# Patient Record
Sex: FEMALE | Race: WHITE | NOT HISPANIC OR LATINO | Employment: FULL TIME | URBAN - NONMETROPOLITAN AREA
[De-identification: names, ages, dates, MRNs, and addresses within clinical notes are randomized per-mention and may not be internally consistent; named-entity substitution may affect disease eponyms.]

---

## 2017-12-11 ENCOUNTER — HOSPITAL ENCOUNTER (EMERGENCY)
Facility: HOSPITAL | Age: 22
Discharge: LEFT BEFORE DISCHARGE | End: 2017-12-11
Attending: EMERGENCY MEDICINE

## 2017-12-11 VITALS
HEART RATE: 140 BPM | WEIGHT: 135 LBS | SYSTOLIC BLOOD PRESSURE: 122 MMHG | DIASTOLIC BLOOD PRESSURE: 73 MMHG | TEMPERATURE: 98.2 F | OXYGEN SATURATION: 99 % | BODY MASS INDEX: 22.49 KG/M2 | HEIGHT: 65 IN | RESPIRATION RATE: 18 BRPM

## 2017-12-11 PROCEDURE — 99211 OFF/OP EST MAY X REQ PHY/QHP: CPT

## 2017-12-11 NOTE — ED NOTES
Made lead nurse aware of patient increased heart rate. Will check in several minutes when patient is not as anxious.      Ayanna Barrios RN  12/11/17 [id number]

## 2019-02-28 ENCOUNTER — TELEPHONE (OUTPATIENT)
Dept: OBSTETRICS AND GYNECOLOGY | Age: 24
End: 2019-02-28

## 2019-02-28 NOTE — TELEPHONE ENCOUNTER
New pt: Requesting a female.lmp 1/15/19.Wants to come in for a blood pregnancy test. Ins: [employer]port. She can be reached @ [phone] or [phone].

## 2019-03-14 ENCOUNTER — OFFICE VISIT (OUTPATIENT)
Dept: OBSTETRICS AND GYNECOLOGY | Age: 24
End: 2019-03-14

## 2019-03-14 ENCOUNTER — PROCEDURE VISIT (OUTPATIENT)
Dept: OBSTETRICS AND GYNECOLOGY | Age: 24
End: 2019-03-14

## 2019-03-14 VITALS
WEIGHT: 143 LBS | SYSTOLIC BLOOD PRESSURE: 122 MMHG | HEIGHT: 65 IN | DIASTOLIC BLOOD PRESSURE: 70 MMHG | BODY MASS INDEX: 23.82 KG/M2

## 2019-03-14 VITALS
HEIGHT: 65 IN | SYSTOLIC BLOOD PRESSURE: 122 MMHG | BODY MASS INDEX: 23.82 KG/M2 | WEIGHT: 143 LBS | DIASTOLIC BLOOD PRESSURE: 70 MMHG

## 2019-03-14 DIAGNOSIS — O02.1 MISSED ABORTION: Primary | ICD-10-CM

## 2019-03-14 DIAGNOSIS — O36.80X0 ENCOUNTER TO DETERMINE FETAL VIABILITY OF PREGNANCY, SINGLE OR UNSPECIFIED FETUS: Primary | ICD-10-CM

## 2019-03-14 DIAGNOSIS — Z13.9 SPECIAL SCREENING: ICD-10-CM

## 2019-03-14 PROBLEM — Z72.0 TOBACCO USE: Status: ACTIVE | Noted: 2019-03-14

## 2019-03-14 PROCEDURE — 99203 OFFICE O/P NEW LOW 30 MIN: CPT | Performed by: OBSTETRICS & GYNECOLOGY

## 2019-03-14 PROCEDURE — 76817 TRANSVAGINAL US OBSTETRIC: CPT | Performed by: OBSTETRICS & GYNECOLOGY

## 2019-03-14 NOTE — PROGRESS NOTES
US completed DELISA Urbina RN RDMS        Ultrasound Note     2019    Patient:  Estephania Pathak      MR#:[id number]    23 y.o.   for dating and viability     Patient Active Problem List   Diagnosis   • Tobacco use   • Missed        [See the scanned report in the media tab for more detail]    Impression    1.  Intrauterine pregnancy at 7w4d by CRL  2.  Nonviable, CRL of 13.1 cm with no cardiac activity. Confirmed by DR. Malagon  3.  The left ovary appears normal and measures 2.85 x 2.72 x 2.11 cm, the right ovary also appears normal and measures 2.50 x 2.09 x 1.51 cm  4.  There is no free fluid in the posterior cul-de-sac    Missed     Relevant comparison data available:  [x] none      Olimpia Franco MD  3/14/2019  5:09 PM

## 2019-03-14 NOTE — PROGRESS NOTES
New GYN Problem    Cc. pregnancy      23 yr old  w history of drug use including IV heroin presents with new pregnancy. Notes that LMP was in . Denies bleeding or cramping. On sonogram, no cardiac activity today.  Started using when she was about 12, introduced by family. Used pills and then heroin. Has known about hep C for four years or so. Has been in treatment before, most recently 1.5 yrs ago and last use was prior to surgery for kidney stone in December.    Review of Systems denies cramping, bleeding, no abdominal pain, no nausea, no vomiting, denies abnormal uterine bleeding    A while ago had one spot of blood after intercourse, none further    Unsure when last pap was- on record 7/28/15. Also normal  and     Histories  Past Medical History:   Diagnosis Date   • Bleeding after intercourse    • BV (bacterial vaginosis)    • Hepatitis C    • Hepatitis-C    • History of ADHD     AGE 6 TO 12,  TOOK PRESCRIPTION.   • Hx of drug abuse    • Yeast infection        Past Surgical History:   Procedure Laterality Date   • KIDNEY STONE SURGERY         Family History   Problem Relation Age of Onset   • Endometriosis Mother        Social History     Socioeconomic History   • Marital status: Single     Spouse name: Not on file   • Number of children: 0   • Years of education: Not on file   • Highest education level: Not on file   Social Needs   • Financial resource strain: Not on file   • Food insecurity - worry: Not on file   • Food insecurity - inability: Not on file   • Transportation needs - medical: Not on file   • Transportation needs - non-medical: Not on file   Occupational History   • Occupation: UNEMPLOYED   Tobacco Use   • Smoking status: Current Every Day Smoker     Packs/day: 1.00     Years: 10.00     Pack years: 10.00     Types: Cigarettes   • Smokeless tobacco: Never Used   Substance and Sexual Activity   • Alcohol use: No   • Drug use: Yes     Types: Marijuana     Comment: heroin daily, pain  pills    • Sexual activity: Yes     Partners: Male     Birth control/protection: None   Other Topics Concern   • Not on file   Social History Narrative    GYN PATIENT SINCE .       OB History      Para Term  AB Living    1 0 0 0 1 0    SAB TAB Ectopic Molar Multiple Live Births    0 0 0   0            Physical Exam    There were no vitals taken for this visit.    BMI: There is no height or weight on file to calculate BMI.     General:   alert, appears stated age and cooperative   Neck Nontender, no lymphadenopathy, no thyromegaly   Lung lungs clear to auscultation, no wheezes or rhonchi   Heart: heart regular rate and rhythm, no murmurs   Abdomen: soft, non-tender, without masses or organomegaly   Breast: deferred   Pelvic exam refused today                            Assessment/Plan       Missed  today, crown-rump length measuring 13.1 mm and no cardiac activity.  No bleeding or cramping.  She desires medical management, discussed with her the surgery and inherent risks including possible uterine perforation and damage to intra-abdominal structure, possible bleeding, possible infection, possible uterine adhesions that could cause infertility or risk for future miscarriage.    She plans to proceed with suction dilation and curettage    Blood type to be obtained tomorrow at the hospital    Urine drug screen sent today    Return for 1-2 week postop D&C.      Olimpia Franco MD  2019  1:19 PM

## 2019-03-15 ENCOUNTER — ANESTHESIA (OUTPATIENT)
Dept: PERIOP | Facility: HOSPITAL | Age: 24
End: 2019-03-15

## 2019-03-15 ENCOUNTER — ANESTHESIA EVENT (OUTPATIENT)
Dept: PERIOP | Facility: HOSPITAL | Age: 24
End: 2019-03-15

## 2019-03-15 ENCOUNTER — HOSPITAL ENCOUNTER (OUTPATIENT)
Facility: HOSPITAL | Age: 24
Setting detail: HOSPITAL OUTPATIENT SURGERY
Discharge: HOME | End: 2019-03-15
Attending: OBSTETRICS & GYNECOLOGY | Admitting: OBSTETRICS & GYNECOLOGY

## 2019-03-15 VITALS
BODY MASS INDEX: 23.76 KG/M2 | TEMPERATURE: 97.5 F | DIASTOLIC BLOOD PRESSURE: 66 MMHG | SYSTOLIC BLOOD PRESSURE: 105 MMHG | HEART RATE: 68 BPM | WEIGHT: 142.64 LBS | OXYGEN SATURATION: 100 % | HEIGHT: 65 IN | RESPIRATION RATE: 16 BRPM

## 2019-03-15 DIAGNOSIS — O02.1 MISSED ABORTION: ICD-10-CM

## 2019-03-15 LAB
ABO GROUP BLD: NORMAL
BASOPHILS # BLD AUTO: 0.03 10*3/MM3 (ref 0–0.2)
BASOPHILS NFR BLD AUTO: 0.5 % (ref 0–1.5)
BLD GP AB SCN SERPL QL: NEGATIVE
DEPRECATED RDW RBC AUTO: 39.1 FL (ref 37–54)
EOSINOPHIL # BLD AUTO: 0.07 10*3/MM3 (ref 0–0.4)
EOSINOPHIL NFR BLD AUTO: 1.1 % (ref 0.3–6.2)
ERYTHROCYTE [DISTWIDTH] IN BLOOD BY AUTOMATED COUNT: 13 % (ref 12.3–15.4)
HCT VFR BLD AUTO: 37 % (ref 34–46.6)
HGB BLD-MCNC: 11.9 G/DL (ref 12–15.9)
IMM GRANULOCYTES # BLD AUTO: 0.02 10*3/MM3 (ref 0–0.05)
IMM GRANULOCYTES NFR BLD AUTO: 0.3 % (ref 0–0.5)
LYMPHOCYTES # BLD AUTO: 1.63 10*3/MM3 (ref 0.7–3.1)
LYMPHOCYTES NFR BLD AUTO: 24.6 % (ref 19.6–45.3)
MCH RBC QN AUTO: 26.4 PG (ref 26.6–33)
MCHC RBC AUTO-ENTMCNC: 32.2 G/DL (ref 31.5–35.7)
MCV RBC AUTO: 82.2 FL (ref 79–97)
MONOCYTES # BLD AUTO: 0.5 10*3/MM3 (ref 0.1–0.9)
MONOCYTES NFR BLD AUTO: 7.5 % (ref 5–12)
NEUTROPHILS # BLD AUTO: 4.38 10*3/MM3 (ref 1.4–7)
NEUTROPHILS NFR BLD AUTO: 66 % (ref 42.7–76)
NRBC BLD AUTO-RTO: 0 /100 WBC (ref 0–0)
PLATELET # BLD AUTO: 310 10*3/MM3 (ref 140–450)
PMV BLD AUTO: 9.9 FL (ref 6–12)
RBC # BLD AUTO: 4.5 10*6/MM3 (ref 3.77–5.28)
RH BLD: POSITIVE
T&S EXPIRATION DATE: NORMAL
WBC NRBC COR # BLD: 6.63 10*3/MM3 (ref 3.4–10.8)

## 2019-03-15 PROCEDURE — 25010000002 HYDROMORPHONE PER 4 MG: Performed by: NURSE ANESTHETIST, CERTIFIED REGISTERED

## 2019-03-15 PROCEDURE — 59820 CARE OF MISCARRIAGE: CPT | Performed by: OBSTETRICS & GYNECOLOGY

## 2019-03-15 PROCEDURE — 85025 COMPLETE CBC W/AUTO DIFF WBC: CPT | Performed by: OBSTETRICS & GYNECOLOGY

## 2019-03-15 PROCEDURE — 25010000002 ONDANSETRON PER 1 MG: Performed by: NURSE ANESTHETIST, CERTIFIED REGISTERED

## 2019-03-15 PROCEDURE — 25010000002 FENTANYL CITRATE (PF) 100 MCG/2ML SOLUTION: Performed by: NURSE ANESTHETIST, CERTIFIED REGISTERED

## 2019-03-15 PROCEDURE — 25010000002 DEXAMETHASONE PER 1 MG: Performed by: NURSE ANESTHETIST, CERTIFIED REGISTERED

## 2019-03-15 PROCEDURE — 86850 RBC ANTIBODY SCREEN: CPT | Performed by: OBSTETRICS & GYNECOLOGY

## 2019-03-15 PROCEDURE — 88305 TISSUE EXAM BY PATHOLOGIST: CPT | Performed by: OBSTETRICS & GYNECOLOGY

## 2019-03-15 PROCEDURE — 25010000002 MIDAZOLAM PER 1 MG: Performed by: ANESTHESIOLOGY

## 2019-03-15 PROCEDURE — 86901 BLOOD TYPING SEROLOGIC RH(D): CPT | Performed by: OBSTETRICS & GYNECOLOGY

## 2019-03-15 PROCEDURE — 25010000002 PROPOFOL 10 MG/ML EMULSION: Performed by: NURSE ANESTHETIST, CERTIFIED REGISTERED

## 2019-03-15 PROCEDURE — 25010000002 MIDAZOLAM PER 1 MG: Performed by: NURSE ANESTHETIST, CERTIFIED REGISTERED

## 2019-03-15 PROCEDURE — 25010000002 METHYLERGONOVINE MALEATE PER 0.2 MG: Performed by: NURSE ANESTHETIST, CERTIFIED REGISTERED

## 2019-03-15 PROCEDURE — S0260 H&P FOR SURGERY: HCPCS | Performed by: OBSTETRICS & GYNECOLOGY

## 2019-03-15 PROCEDURE — 86900 BLOOD TYPING SEROLOGIC ABO: CPT | Performed by: OBSTETRICS & GYNECOLOGY

## 2019-03-15 RX ORDER — OXYCODONE AND ACETAMINOPHEN 7.5; 325 MG/1; MG/1
1 TABLET ORAL ONCE AS NEEDED
Status: DISCONTINUED | OUTPATIENT
Start: 2019-03-15 | End: 2019-03-15 | Stop reason: HOSPADM

## 2019-03-15 RX ORDER — EPHEDRINE SULFATE 50 MG/ML
5 INJECTION, SOLUTION INTRAVENOUS ONCE AS NEEDED
Status: DISCONTINUED | OUTPATIENT
Start: 2019-03-15 | End: 2019-03-15 | Stop reason: HOSPADM

## 2019-03-15 RX ORDER — HYDROCODONE BITARTRATE AND ACETAMINOPHEN 7.5; 325 MG/1; MG/1
1 TABLET ORAL ONCE AS NEEDED
Status: DISCONTINUED | OUTPATIENT
Start: 2019-03-15 | End: 2019-03-15 | Stop reason: HOSPADM

## 2019-03-15 RX ORDER — PROMETHAZINE HYDROCHLORIDE 25 MG/ML
12.5 INJECTION, SOLUTION INTRAMUSCULAR; INTRAVENOUS ONCE AS NEEDED
Status: DISCONTINUED | OUTPATIENT
Start: 2019-03-15 | End: 2019-03-15 | Stop reason: HOSPADM

## 2019-03-15 RX ORDER — HYDRALAZINE HYDROCHLORIDE 20 MG/ML
5 INJECTION INTRAMUSCULAR; INTRAVENOUS
Status: DISCONTINUED | OUTPATIENT
Start: 2019-03-15 | End: 2019-03-15 | Stop reason: HOSPADM

## 2019-03-15 RX ORDER — PROMETHAZINE HYDROCHLORIDE 25 MG/1
25 TABLET ORAL ONCE AS NEEDED
Status: DISCONTINUED | OUTPATIENT
Start: 2019-03-15 | End: 2019-03-15 | Stop reason: HOSPADM

## 2019-03-15 RX ORDER — PROPOFOL 10 MG/ML
VIAL (ML) INTRAVENOUS AS NEEDED
Status: DISCONTINUED | OUTPATIENT
Start: 2019-03-15 | End: 2019-03-15 | Stop reason: SURG

## 2019-03-15 RX ORDER — MIDAZOLAM HYDROCHLORIDE 1 MG/ML
INJECTION INTRAMUSCULAR; INTRAVENOUS AS NEEDED
Status: DISCONTINUED | OUTPATIENT
Start: 2019-03-15 | End: 2019-03-15 | Stop reason: SURG

## 2019-03-15 RX ORDER — FENTANYL CITRATE 50 UG/ML
50 INJECTION, SOLUTION INTRAMUSCULAR; INTRAVENOUS
Status: DISCONTINUED | OUTPATIENT
Start: 2019-03-15 | End: 2019-03-15 | Stop reason: HOSPADM

## 2019-03-15 RX ORDER — IBUPROFEN 600 MG/1
600 TABLET ORAL ONCE AS NEEDED
Status: COMPLETED | OUTPATIENT
Start: 2019-03-15 | End: 2019-03-15

## 2019-03-15 RX ORDER — FLUMAZENIL 0.1 MG/ML
0.2 INJECTION INTRAVENOUS AS NEEDED
Status: DISCONTINUED | OUTPATIENT
Start: 2019-03-15 | End: 2019-03-15 | Stop reason: HOSPADM

## 2019-03-15 RX ORDER — LABETALOL HYDROCHLORIDE 5 MG/ML
5 INJECTION, SOLUTION INTRAVENOUS
Status: DISCONTINUED | OUTPATIENT
Start: 2019-03-15 | End: 2019-03-15 | Stop reason: HOSPADM

## 2019-03-15 RX ORDER — FAMOTIDINE 10 MG/ML
20 INJECTION, SOLUTION INTRAVENOUS ONCE
Status: COMPLETED | OUTPATIENT
Start: 2019-03-15 | End: 2019-03-15

## 2019-03-15 RX ORDER — PROMETHAZINE HYDROCHLORIDE 25 MG/1
25 SUPPOSITORY RECTAL ONCE AS NEEDED
Status: DISCONTINUED | OUTPATIENT
Start: 2019-03-15 | End: 2019-03-15 | Stop reason: HOSPADM

## 2019-03-15 RX ORDER — DEXAMETHASONE SODIUM PHOSPHATE 10 MG/ML
INJECTION INTRAMUSCULAR; INTRAVENOUS AS NEEDED
Status: DISCONTINUED | OUTPATIENT
Start: 2019-03-15 | End: 2019-03-15 | Stop reason: SURG

## 2019-03-15 RX ORDER — MIDAZOLAM HYDROCHLORIDE 1 MG/ML
1 INJECTION INTRAMUSCULAR; INTRAVENOUS
Status: DISCONTINUED | OUTPATIENT
Start: 2019-03-15 | End: 2019-03-15 | Stop reason: HOSPADM

## 2019-03-15 RX ORDER — METHADONE HYDROCHLORIDE 10 MG/1
10 TABLET ORAL ONCE
Status: COMPLETED | OUTPATIENT
Start: 2019-03-15 | End: 2019-03-15

## 2019-03-15 RX ORDER — ONDANSETRON 2 MG/ML
INJECTION INTRAMUSCULAR; INTRAVENOUS AS NEEDED
Status: DISCONTINUED | OUTPATIENT
Start: 2019-03-15 | End: 2019-03-15 | Stop reason: SURG

## 2019-03-15 RX ORDER — IBUPROFEN 600 MG/1
600 TABLET ORAL EVERY 6 HOURS PRN
Qty: 30 TABLET | Refills: 0 | Status: SHIPPED | OUTPATIENT
Start: 2019-03-15 | End: 2019-10-11

## 2019-03-15 RX ORDER — HYDROMORPHONE HYDROCHLORIDE 1 MG/ML
0.5 INJECTION, SOLUTION INTRAMUSCULAR; INTRAVENOUS; SUBCUTANEOUS
Status: DISCONTINUED | OUTPATIENT
Start: 2019-03-15 | End: 2019-03-15 | Stop reason: HOSPADM

## 2019-03-15 RX ORDER — NALOXONE HCL 0.4 MG/ML
0.2 VIAL (ML) INJECTION AS NEEDED
Status: DISCONTINUED | OUTPATIENT
Start: 2019-03-15 | End: 2019-03-15 | Stop reason: HOSPADM

## 2019-03-15 RX ORDER — METHYLERGONOVINE MALEATE 0.2 MG/ML
INJECTION INTRAVENOUS AS NEEDED
Status: DISCONTINUED | OUTPATIENT
Start: 2019-03-15 | End: 2019-03-15 | Stop reason: SURG

## 2019-03-15 RX ORDER — LIDOCAINE HYDROCHLORIDE 10 MG/ML
0.5 INJECTION, SOLUTION EPIDURAL; INFILTRATION; INTRACAUDAL; PERINEURAL ONCE AS NEEDED
Status: DISCONTINUED | OUTPATIENT
Start: 2019-03-15 | End: 2019-03-15 | Stop reason: HOSPADM

## 2019-03-15 RX ORDER — SODIUM CHLORIDE, SODIUM LACTATE, POTASSIUM CHLORIDE, CALCIUM CHLORIDE 600; 310; 30; 20 MG/100ML; MG/100ML; MG/100ML; MG/100ML
9 INJECTION, SOLUTION INTRAVENOUS CONTINUOUS
Status: DISCONTINUED | OUTPATIENT
Start: 2019-03-15 | End: 2019-03-15 | Stop reason: HOSPADM

## 2019-03-15 RX ORDER — FENTANYL CITRATE 50 UG/ML
INJECTION, SOLUTION INTRAMUSCULAR; INTRAVENOUS AS NEEDED
Status: DISCONTINUED | OUTPATIENT
Start: 2019-03-15 | End: 2019-03-15 | Stop reason: SURG

## 2019-03-15 RX ORDER — MAGNESIUM HYDROXIDE 1200 MG/15ML
LIQUID ORAL AS NEEDED
Status: DISCONTINUED | OUTPATIENT
Start: 2019-03-15 | End: 2019-03-15 | Stop reason: HOSPADM

## 2019-03-15 RX ORDER — DIPHENHYDRAMINE HCL 25 MG
25 CAPSULE ORAL
Status: DISCONTINUED | OUTPATIENT
Start: 2019-03-15 | End: 2019-03-15 | Stop reason: HOSPADM

## 2019-03-15 RX ORDER — ACETAMINOPHEN 325 MG/1
650 TABLET ORAL ONCE AS NEEDED
Status: DISCONTINUED | OUTPATIENT
Start: 2019-03-15 | End: 2019-03-15 | Stop reason: HOSPADM

## 2019-03-15 RX ORDER — MIDAZOLAM HYDROCHLORIDE 1 MG/ML
2 INJECTION INTRAMUSCULAR; INTRAVENOUS
Status: DISCONTINUED | OUTPATIENT
Start: 2019-03-15 | End: 2019-03-15 | Stop reason: HOSPADM

## 2019-03-15 RX ORDER — SODIUM CHLORIDE 0.9 % (FLUSH) 0.9 %
1-10 SYRINGE (ML) INJECTION AS NEEDED
Status: DISCONTINUED | OUTPATIENT
Start: 2019-03-15 | End: 2019-03-15 | Stop reason: HOSPADM

## 2019-03-15 RX ORDER — ONDANSETRON 2 MG/ML
4 INJECTION INTRAMUSCULAR; INTRAVENOUS ONCE AS NEEDED
Status: COMPLETED | OUTPATIENT
Start: 2019-03-15 | End: 2019-03-15

## 2019-03-15 RX ORDER — LIDOCAINE HYDROCHLORIDE 20 MG/ML
INJECTION, SOLUTION INFILTRATION; PERINEURAL AS NEEDED
Status: DISCONTINUED | OUTPATIENT
Start: 2019-03-15 | End: 2019-03-15 | Stop reason: SURG

## 2019-03-15 RX ORDER — DIPHENHYDRAMINE HCL 25 MG
25 CAPSULE ORAL EVERY 6 HOURS PRN
Status: DISCONTINUED | OUTPATIENT
Start: 2019-03-15 | End: 2019-03-15 | Stop reason: HOSPADM

## 2019-03-15 RX ADMIN — DEXAMETHASONE SODIUM PHOSPHATE 4 MG: 10 INJECTION INTRAMUSCULAR; INTRAVENOUS at 12:19

## 2019-03-15 RX ADMIN — FENTANYL CITRATE 50 MCG: 50 INJECTION INTRAMUSCULAR; INTRAVENOUS at 12:04

## 2019-03-15 RX ADMIN — ONDANSETRON 4 MG: 2 INJECTION INTRAMUSCULAR; INTRAVENOUS at 13:32

## 2019-03-15 RX ADMIN — FENTANYL CITRATE 50 MCG: 50 INJECTION, SOLUTION INTRAMUSCULAR; INTRAVENOUS at 14:23

## 2019-03-15 RX ADMIN — METHADONE HYDROCHLORIDE 10 MG: 10 TABLET ORAL at 11:01

## 2019-03-15 RX ADMIN — LIDOCAINE HYDROCHLORIDE 60 MG: 20 INJECTION, SOLUTION INFILTRATION; PERINEURAL at 12:07

## 2019-03-15 RX ADMIN — HYDROMORPHONE HYDROCHLORIDE 0.5 MG: 1 INJECTION, SOLUTION INTRAMUSCULAR; INTRAVENOUS; SUBCUTANEOUS at 13:17

## 2019-03-15 RX ADMIN — HYDROMORPHONE HYDROCHLORIDE 0.5 MG: 1 INJECTION, SOLUTION INTRAMUSCULAR; INTRAVENOUS; SUBCUTANEOUS at 13:30

## 2019-03-15 RX ADMIN — METHYLERGONOVINE MALEATE 200 MCG: 0.2 INJECTION INTRAMUSCULAR; INTRAVENOUS at 12:25

## 2019-03-15 RX ADMIN — FENTANYL CITRATE 50 MCG: 50 INJECTION, SOLUTION INTRAMUSCULAR; INTRAVENOUS at 14:04

## 2019-03-15 RX ADMIN — PROPOFOL 200 MG: 10 INJECTION, EMULSION INTRAVENOUS at 12:07

## 2019-03-15 RX ADMIN — SODIUM CHLORIDE, POTASSIUM CHLORIDE, SODIUM LACTATE AND CALCIUM CHLORIDE 9 ML/HR: 600; 310; 30; 20 INJECTION, SOLUTION INTRAVENOUS at 11:01

## 2019-03-15 RX ADMIN — MIDAZOLAM 1 MG: 1 INJECTION INTRAMUSCULAR; INTRAVENOUS at 11:01

## 2019-03-15 RX ADMIN — FAMOTIDINE 20 MG: 10 INJECTION INTRAVENOUS at 11:01

## 2019-03-15 RX ADMIN — FENTANYL CITRATE 50 MCG: 50 INJECTION INTRAMUSCULAR; INTRAVENOUS at 12:09

## 2019-03-15 RX ADMIN — Medication 2 MG: at 12:04

## 2019-03-15 RX ADMIN — ONDANSETRON 4 MG: 2 INJECTION INTRAMUSCULAR; INTRAVENOUS at 12:19

## 2019-03-15 RX ADMIN — IBUPROFEN 600 MG: 600 TABLET ORAL at 13:13

## 2019-03-15 NOTE — ANESTHESIA PROCEDURE NOTES
Airway  Urgency: elective      General Information and Staff    Patient location during procedure: OR  Anesthesiologist: Dagoberto Romero MD  CRNA: Prerna Hooks CRNA    Indications and Patient Condition  Indications for airway management: airway protection    Preoxygenated: yes  Mask difficulty assessment: 1 - vent by mask    Final Airway Details  Final airway type: supraglottic airway      Successful airway: LMA  Size 4    Number of attempts at approach: 1

## 2019-03-15 NOTE — H&P
Patient Care Team:  Provider, No Known as PCP - General    Chief complaint Missed      Subjective     History of Present Illness 23 yr old  at 7-4 weeks today on US w history of drug use including IV heroin presents with new pregnancy. Notes that LMP was in . Denies bleeding or cramping. On sonogram, no cardiac activity today.  Started using when she was about 12, introduced by family. Used pills and then heroin. Has known about hep C for four years or so. Has been in treatment before, most recently 1.5 yrs ago and last use was prior to surgery for kidney stone in December.  Missed  today, crown-rump length measuring 13.1 mm and no cardiac activity.  No bleeding or cramping.  She desires medical management, discussed with her the surgery and inherent risks including possible uterine perforation and damage to intra-abdominal structure, possible bleeding, possible infection, possible uterine adhesions that could cause infertility or risk for future miscarriage.     She plans to proceed with suction dilation and curettage        Review of Systems   Constitutional: Negative for chills and fever.   Gastrointestinal: Negative for abdominal distention and abdominal pain.   Genitourinary: Negative for dyspareunia, dysuria, menstrual problem, pelvic pain, vaginal bleeding, vaginal discharge and vaginal pain.   All other systems reviewed and are negative.       Past Medical History:   Diagnosis Date   • Bleeding after intercourse    • BV (bacterial vaginosis)    • Hepatitis C    • Hepatitis-C    • History of ADHD     AGE 6 TO 12,  TOOK PRESCRIPTION.   • Hx of drug abuse    • Yeast infection      Past Surgical History:   Procedure Laterality Date   • KIDNEY STONE SURGERY       Family History   Problem Relation Age of Onset   • Endometriosis Mother    • Malig Hyperthermia Neg Hx      Social History     Tobacco Use   • Smoking status: Current Every Day Smoker     Packs/day: 1.00     Years: 10.00      Pack years: 10.00     Types: Cigarettes   • Smokeless tobacco: Never Used   • Tobacco comment: today   Substance Use Topics   • Alcohol use: No   • Drug use: Yes     Types: Marijuana     Comment: heroin daily, pain pills-occasionally, a month for marijuana     No medications prior to admission.     Allergies:  Patient has no known allergies.    Objective      Vital Signs  Temp:  [98.9 °F (37.2 °C)] 98.9 °F (37.2 °C)  Heart Rate:  [67-70] 67  Resp:  [16-20] 16  BP: (115-122)/(54-70) 115/54    Physical Exam   Constitutional: She is oriented to person, place, and time. She appears well-developed and well-nourished.   Pulmonary/Chest: Effort normal.   Neurological: She is alert and oriented to person, place, and time.   Skin: Skin is warm and dry.   Psychiatric: She has a normal mood and affect. Her behavior is normal.   Nursing note and vitals reviewed.    Results from last 7 days   Lab Units 03/15/19  1038   WBC 10*3/mm3 6.63   HEMOGLOBIN g/dL 11.9*   HEMATOCRIT % 37.0   PLATELETS 10*3/mm3 310       Results Review:   I reviewed the patient's new clinical results.      Assessment/Plan       Missed       Assessment & Plan    I discussed the patients findings and my recommendations with patient    Megan Campos MD  03/15/19  11:55 AM

## 2019-03-15 NOTE — ANESTHESIA POSTPROCEDURE EVALUATION
"Patient: Estephania Pathak    Procedure Summary     Date:  03/15/19 Room / Location:  [facility] OR  [facility] MA[facility]    Anesthesia Start:  1202 Anesthesia Stop:  1242    Procedure:  DILATATION AND CURETTAGE WITH SUCTION (N/A Vagina) Diagnosis:       Missed       (Missed  [O02.1])    Surgeon:  Megan Campos MD Provider:  Dagoberto Romero MD    Anesthesia Type:  general ASA Status:  3          Anesthesia Type: general  Last vitals  BP   113/75 (03/15/19 1415)   Temp   36.4 °C (97.5 °F) (03/15/19 1238)   Pulse   71 (03/15/19 1415)   Resp   16 (03/15/19 1415)     SpO2   100 % (03/15/19 1415)     Post Anesthesia Care and Evaluation    Patient location during evaluation: bedside  Patient participation: complete - patient participated  Level of consciousness: awake and alert  Pain management: adequate  Airway patency: patent  Anesthetic complications: No anesthetic complications    Cardiovascular status: acceptable  Respiratory status: acceptable  Hydration status: acceptable    Comments: /66   Pulse 68   Temp 36.4 °C (97.5 °F) (Oral)   Resp 16   Ht 165.1 cm (65\")   Wt 64.7 kg (142 lb 10.2 oz)   SpO2 100%   BMI 23.74 kg/m²       "

## 2019-03-15 NOTE — OP NOTE
Operative Note    Date of Procedure: 3/15/2019    Patient:  Estephania Pathak   MR#: [id number]    PREOPERATIVE DIAGNOSIS: Missed  [O02.1]    Post-Op Diagnosis Codes:     * Missed  [O02.1]      PROCEDURES PERFORMED:    1. Suction Dilation and Curretage       SURGEON: Megan Campos MD    ANESTHESIA: General.      ESTIMATED BLOOD LOSS: 100 mL.      SPECIMENS:     Order Name Source Comment Collection Info Order Time   TYPE AND SCREEN   Collected By: Pooja Borrero RN 3/15/2019  9:58 AM   TISSUE PATHOLOGY EXAM Products of Conception  Collected By: Megan Campos MD 3/15/2019 12:21 PM       COMPLICATIONS: None.      INDICATIONS: See the written history and physical.      DESCRIPTION OF PROCEDURE: The patient is taken to the operating room and placed in supine position.  General  anesthesia is administered and the surgical timeout for  procedure is performed.    The patient is prepped and draped in the usual sterile fashion with her legs positioned in candycane stirrups.  Weighted speculum is inserted into the vagina and the cervix is grasped anteriorly with a single-tooth tenaculum.  Sequential dilators were used to dilate the cervix to a maximum of 18 [place].  An 8 mm curved cannula is inserted into the uterine cavity and in a rotating fashion is used to evacuate retained products of conception.  The procedure is repeated approximately 3 times then a single pass is made with the sharp curette followed by final pass with the suction curet.  Findings were consistent with products of conception and scant bleeding is noted after the procedure.    The patient's awakened and taken recovery room in stable condition to patient's blood type is A +.       Megan Campos MD  3/15/2019  12:41 PM

## 2019-03-15 NOTE — ANESTHESIA PREPROCEDURE EVALUATION
Anesthesia Evaluation     Patient summary reviewed and Nursing notes reviewed   no history of anesthetic complications:  NPO Solid Status: > 8 hours  NPO Liquid Status: > 2 hours           Airway   Mallampati: II  TM distance: >3 FB  Neck ROM: full  No difficulty expected  Dental          Pulmonary    (+) a smoker Current Smoked day of surgery,   (-) rhonchi, decreased breath sounds, wheezes  Cardiovascular   Exercise tolerance: good (4-7 METS)    Rhythm: regular  Rate: normal    (-) hypertension, CAD, angina, MCCANN, murmur      Neuro/Psych  (-) seizures, CVA  GI/Hepatic/Renal/Endo    (+)   hepatitis C, liver disease,   (-) no renal disease, diabetes    Musculoskeletal     Abdominal     Abdomen: soft.   Substance History   (+) drug use      Comment: Heroin, THC, pain pills   OB/GYN    (+) Pregnant,         Other                      Anesthesia Plan    ASA 3     general   (LMA)  intravenous induction   Anesthetic plan, all risks, benefits, and alternatives have been provided, discussed and informed consent has been obtained with: patient.

## 2019-03-16 LAB
CYTO UR: NORMAL
LAB AP CASE REPORT: NORMAL
PATH REPORT.FINAL DX SPEC: NORMAL
PATH REPORT.GROSS SPEC: NORMAL

## 2019-03-19 LAB
AMPHETAMINES UR QL SCN: NEGATIVE NG/ML
BARBITURATES UR QL SCN: NEGATIVE NG/ML
BENZODIAZ UR QL: NEGATIVE NG/ML
BZE UR QL: POSITIVE
CANNABINOIDS UR QL SCN: NEGATIVE NG/ML
METHADONE UR QL SCN: NEGATIVE NG/ML
OPIATES UR QL: POSITIVE
PCP UR QL: NEGATIVE NG/ML
PROPOXYPH UR QL SCN: NEGATIVE NG/ML

## 2019-04-04 ENCOUNTER — OFFICE VISIT (OUTPATIENT)
Dept: OBSTETRICS AND GYNECOLOGY | Age: 24
End: 2019-04-04

## 2019-04-04 VITALS
WEIGHT: 136.6 LBS | HEIGHT: 65 IN | BODY MASS INDEX: 22.76 KG/M2 | SYSTOLIC BLOOD PRESSURE: 110 MMHG | DIASTOLIC BLOOD PRESSURE: 70 MMHG

## 2019-04-04 DIAGNOSIS — O02.1 MISSED ABORTION: ICD-10-CM

## 2019-04-04 DIAGNOSIS — Z13.89 SCREENING FOR BLOOD OR PROTEIN IN URINE: Primary | ICD-10-CM

## 2019-04-04 LAB
BILIRUB BLD-MCNC: NEGATIVE MG/DL
GLUCOSE UR STRIP-MCNC: NEGATIVE MG/DL
KETONES UR QL: NEGATIVE
LEUKOCYTE EST, POC: NEGATIVE
NITRITE UR-MCNC: NEGATIVE MG/ML
PH UR: 7 [PH] (ref 5–8)
PROT UR STRIP-MCNC: NEGATIVE MG/DL
RBC # UR STRIP: NEGATIVE /UL
SP GR UR: 1.01 (ref 1–1.03)
UROBILINOGEN UR QL: NORMAL

## 2019-04-04 PROCEDURE — 99024 POSTOP FOLLOW-UP VISIT: CPT | Performed by: OBSTETRICS & GYNECOLOGY

## 2019-04-05 NOTE — PROGRESS NOTES
"Subjective     Chief Complaint   Patient presents with   • Follow-up     2 wk post op D&C, pt doing well        Estephania Pathak is a 23 y.o.  who presents to follow up after suction D&C for missed Ab. Had cramping, spotting, lower abd tenderness after the surgery but resolved after 2-3 days. Feels well now but resumed intercourse and had some pain.  Has not had annual exam in a while, needs to do that as well.    No Additional Complaints Reported    The following portions of the patient's history were reviewed and updated as appropriate:vital signs, allergies, current medications, past medical history, past social history, past surgical history and problem list    Review of Systems- denies fevers, chills, abdominal pain, cramping, vaginal bleeding.    Objective      /70   Ht 165.1 cm (65\")   Wt 62 kg (136 lb 9.6 oz)   Breastfeeding? No Comment: recent D&C  BMI 22.73 kg/m²     Physical Exam   Appears well, no distress  Lungs CTAB  Heart RRR, no M/R/G  Abdomen soft and nontender, nondistended    Assessment/Plan     Problems Addressed this Visit     None      Visit Diagnoses     Screening for blood or protein in urine    -  Primary    Relevant Orders    POC Urinalysis Dipstick (Completed)        Missed Ab s/p D&C, recommended waiting another 1-2 weeks before resuming intercourse.  Substance abuse: discussed need to abstain from substances. Today denies need for assistance doing so  Discussed smoking cessation, she and her partner are interested in quitting smoking  Reviewed contraception, she declines any form though they do not want to become pregnant. Encouraged consistent condom use    Follow up for annual exam    Olimpia Franco MD  2019             "

## 2019-07-19 ENCOUNTER — OFFICE VISIT (OUTPATIENT)
Dept: OBSTETRICS AND GYNECOLOGY | Age: 24
End: 2019-07-19

## 2019-07-19 VITALS
DIASTOLIC BLOOD PRESSURE: 68 MMHG | WEIGHT: 143.6 LBS | BODY MASS INDEX: 23.93 KG/M2 | HEIGHT: 65 IN | SYSTOLIC BLOOD PRESSURE: 106 MMHG

## 2019-07-19 VITALS
WEIGHT: 143.6 LBS | BODY MASS INDEX: 23.93 KG/M2 | HEIGHT: 65 IN | DIASTOLIC BLOOD PRESSURE: 68 MMHG | SYSTOLIC BLOOD PRESSURE: 106 MMHG

## 2019-07-19 DIAGNOSIS — Z72.0 TOBACCO USE: Primary | ICD-10-CM

## 2019-07-19 DIAGNOSIS — Z12.4 SCREENING FOR CERVICAL CANCER: Primary | ICD-10-CM

## 2019-07-19 PROCEDURE — 99395 PREV VISIT EST AGE 18-39: CPT | Performed by: OBSTETRICS & GYNECOLOGY

## 2019-07-19 RX ORDER — BUPRENORPHINE HYDROCHLORIDE AND NALOXONE HYDROCHLORIDE DIHYDRATE 8; 2 MG/1; MG/1
TABLET SUBLINGUAL
Refills: 0 | COMMUNITY
Start: 2019-05-23 | End: 2019-10-11

## 2019-07-19 RX ORDER — AMOXICILLIN 250 MG/1
250 CAPSULE ORAL EVERY 12 HOURS
Refills: 0 | COMMUNITY
Start: 2019-05-23 | End: 2019-10-11

## 2019-07-19 NOTE — PROGRESS NOTES
Routine Annual Visit    2019    Patient: Estephania Pathak          MR#:[id number]      Chief Complaint   Patient presents with   • Gynecologic Exam     Annual.  Last pap 7/28/15,  negative.        History of Present Illness    23 y.o. female  who presents for annual exam and to discuss contraception.  She has had opiate addiction but is now in recovery.  She is doing intensive outpatient and is currently on methadone.  She has had some relapses, but is hopeful she will do well.  She is sexually active, not consistently using condoms.  She would like something very effective for birth control.  Is considering an IUD versus Nexplanon but not ready to make a decision.  Does not want to quit smoking  Denies any current abuse    She was just recently on antibiotics and concerned she may get a yeast infection but she has no current symptoms.    Health Maintenance  Gardasil unsure  Last pap last Pap , denies abnormal  Mammogram N/A  Colonoscopy N/A  PCP has none    Patient's last menstrual period was 2019 (approximate).  Obstetric History:  OB History      Para Term  AB Living    1 0 0 0 1 0    SAB TAB Ectopic Molar Multiple Live Births    1 0 0 0 0 0         Menstrual History:     Patient's last menstrual period was 2019 (approximate).       ________________________________________  Patient Active Problem List   Diagnosis   • Tobacco use   • Missed        Past Medical History:   Diagnosis Date   • Bleeding after intercourse    • BV (bacterial vaginosis)    • Hepatitis C    • Hepatitis-C    • History of ADHD     AGE 6 TO 12,  TOOK PRESCRIPTION.   • Hx of drug abuse    • Opiate addiction (CMS/HCC)    • Yeast infection        Family History   Problem Relation Age of Onset   • Endometriosis Mother    • Malig Hyperthermia Neg Hx    • Breast cancer Neg Hx    • Uterine cancer Neg Hx    • Ovarian cancer Neg Hx    • Colon cancer Neg Hx        Past Surgical History:   Procedure  "Laterality Date   • D&C WITH SUCTION N/A 3/15/2019    Procedure: DILATATION AND CURETTAGE WITH SUCTION;  Surgeon: Megan Campos MD;  Location: [facility];  Service: Obstetrics/Gynecology   • KIDNEY STONE SURGERY         Social History     Tobacco Use   Smoking Status Current Every Day Smoker   • Packs/day: 1.00   • Years: 10.00   • Pack years: 10.00   • Types: Cigarettes   Smokeless Tobacco Never Used   Tobacco Comment    not  interested currently       has a current medication list which includes the following prescription(s): amoxicillin, buprenorphine-naloxone, and ibuprofen.  ________________________________________    Current contraception: none  History of abnormal Pap smear: no  Family history of Breast, ovarian, uterine, colon, pancreatic cancer: no  History of abnormal mammogram: NA    The following portions of the patient's history were reviewed and updated as appropriate: allergies, current medications, past family history, past medical history, past social history, past surgical history and problem list.    Review of Systems- comprehensive ROS is negative today    Objective   Physical Exam    /68   Ht 165.1 cm (65\")   Wt 65.1 kg (143 lb 9.6 oz)   LMP 06/08/2019 (Approximate)   Breastfeeding? No   BMI 23.90 kg/m²    BP Readings from Last 3 Encounters:   07/19/19 106/68   07/19/19 106/68   04/04/19 110/70      Wt Readings from Last 3 Encounters:   07/19/19 65.1 kg (143 lb 9.6 oz)   07/19/19 65.1 kg (143 lb 9.6 oz)   04/04/19 62 kg (136 lb 9.6 oz)         BMI: Body mass index is 23.9 kg/m².       General:   alert, appears stated age and cooperative   Heart:: regular rate and rhythm, S1, S2 normal, no murmur, click, rub or gallop   Lungs: normal respiratory effort and auscultation   Abdomen: soft, non-tender, without masses or organomegaly   Breast: inspection negative, no nipple discharge or bleeding, no masses or nodularity palpable   Urethra and bladder: urethral meatus normal; " bladder nontender to palpation;   Vulva: normal, Bartholin's, Urethra, [place]'s normal   Vagina: normal mucosa, normal discharge   Cervix: no lesions   Uterus: normal size or anteverted   Adnexa: normal adnexa and no mass, fullness, tenderness       Assessment:    normal annual exam     Plan:    Plan     []  Mammogram request made  [x]  PAP done  []  Labs:   [x]  GC/Chl/TV  []  DEXA scan   []  Referral for colonoscopy:     Problems Addressed this Visit     None      Visit Diagnoses     Screening for cervical cancer    -  Primary    Relevant Orders    Pap IG, Ct-Ng TV Rfx HPV ASCU (Completed)          Counseling  [x]  Nutrition  [x]  Physical activity/regular exercise   [x]  Healthy weight  []  Injury prevention  [x]  Smoking cessation  [x]  Substance misuse/abuse  [x]  Sexual behavior  [x]  STD prevention  [x]  Contraception- considering mirena. Will call w decision  []  Dental health  []  Mental health  []  Immunization  []  Encouraged JENNIFER Franco MD  07/19/2019  3:59 PM

## 2019-07-24 LAB
C TRACH RRNA CVX QL NAA+PROBE: NEGATIVE
CONV .: NORMAL
CYTOLOGIST CVX/VAG CYTO: NORMAL
CYTOLOGY CVX/VAG DOC CYTO: NORMAL
CYTOLOGY CVX/VAG DOC THIN PREP: NORMAL
DX ICD CODE: NORMAL
HIV 1 & 2 AB SER-IMP: NORMAL
N GONORRHOEA RRNA CVX QL NAA+PROBE: NEGATIVE
OTHER STN SPEC: NORMAL
STAT OF ADQ CVX/VAG CYTO-IMP: NORMAL
T VAGINALIS RRNA SPEC QL NAA+PROBE: NEGATIVE

## 2019-07-25 ENCOUNTER — TELEPHONE (OUTPATIENT)
Dept: OBSTETRICS AND GYNECOLOGY | Age: 24
End: 2019-07-25

## 2019-08-01 RX ORDER — FLUCONAZOLE 150 MG/1
150 TABLET ORAL DAILY
Qty: 2 TABLET | Refills: 0 | Status: SHIPPED | OUTPATIENT
Start: 2019-08-01 | End: 2019-10-11

## 2019-08-01 NOTE — TELEPHONE ENCOUNTER
Patient called and said she has been on several antibiotics in the last couple of weeks and now has a yeast infection.  Wants to know if you can call in a prescription to her pharmacy.    ________    Rx approved    Olimpia Franco MD  8/1/2019  5:20 PM

## 2019-10-11 ENCOUNTER — PROCEDURE VISIT (OUTPATIENT)
Dept: OBSTETRICS AND GYNECOLOGY | Age: 24
End: 2019-10-11

## 2019-10-11 ENCOUNTER — INITIAL PRENATAL (OUTPATIENT)
Dept: OBSTETRICS AND GYNECOLOGY | Age: 24
End: 2019-10-11

## 2019-10-11 VITALS — DIASTOLIC BLOOD PRESSURE: 68 MMHG | BODY MASS INDEX: 22.76 KG/M2 | SYSTOLIC BLOOD PRESSURE: 108 MMHG | WEIGHT: 136.8 LBS

## 2019-10-11 DIAGNOSIS — Z72.0 TOBACCO USE: ICD-10-CM

## 2019-10-11 DIAGNOSIS — F19.11 HISTORY OF DRUG ABUSE (HCC): ICD-10-CM

## 2019-10-11 DIAGNOSIS — O98.419 CHRONIC HEPATITIS C COMPLICATING PREGNANCY, ANTEPARTUM (HCC): ICD-10-CM

## 2019-10-11 DIAGNOSIS — O36.80X0 ENCOUNTER TO DETERMINE FETAL VIABILITY OF PREGNANCY, SINGLE OR UNSPECIFIED FETUS: Primary | ICD-10-CM

## 2019-10-11 DIAGNOSIS — Z34.80 SUPERVISION OF OTHER NORMAL PREGNANCY, ANTEPARTUM: ICD-10-CM

## 2019-10-11 DIAGNOSIS — F11.21 OPIOID DEPENDENCE IN REMISSION (HCC): Primary | ICD-10-CM

## 2019-10-11 DIAGNOSIS — B18.2 CHRONIC HEPATITIS C COMPLICATING PREGNANCY, ANTEPARTUM (HCC): ICD-10-CM

## 2019-10-11 DIAGNOSIS — Z11.3 SCREENING FOR STD (SEXUALLY TRANSMITTED DISEASE): ICD-10-CM

## 2019-10-11 PROBLEM — F11.20 OPIATE ADDICTION (HCC): Status: ACTIVE | Noted: 2019-10-11

## 2019-10-11 PROCEDURE — 99213 OFFICE O/P EST LOW 20 MIN: CPT | Performed by: OBSTETRICS & GYNECOLOGY

## 2019-10-11 PROCEDURE — 76817 TRANSVAGINAL US OBSTETRIC: CPT | Performed by: OBSTETRICS & GYNECOLOGY

## 2019-10-11 RX ORDER — DOXYLAMINE SUCCINATE AND PYRIDOXINE HYDROCHLORIDE, DELAYED RELEASE TABLETS 10 MG/10 MG 10; 10 MG/1; MG/1
1 TABLET, DELAYED RELEASE ORAL EVERY 6 HOURS PRN
Qty: 60 TABLET | Refills: 2 | Status: SHIPPED | OUTPATIENT
Start: 2019-10-11 | End: 2020-05-22 | Stop reason: HOSPADM

## 2019-10-11 RX ORDER — ASCORBIC ACID, BIOTIN, CHOLECALCIFEROL, CYANOCOBALAMIN, FOLIC ACID, FERROUS ASPARTO GLYCINATE, POTASSIUM IODIDE, PYRIDOXINE HYDROCHLORIDE, .ALPHA.-TOCOPHEROL ACETATE, DL-, DOCUSATE SODIUM AND BLUEBERRY 30; 75; 500; 13; 400; 10; 150; 5; 10; 200; 5; 600 MG/1; UG/1; [IU]/1; UG/1; UG/1; MG/1; UG/1; MG/1; [IU]/1; MG/1; MG/1; UG/1
1 TABLET, FILM COATED ORAL DAILY
Qty: 30 CAPSULE | Refills: 11 | Status: SHIPPED | OUTPATIENT
Start: 2019-10-11 | End: 2020-09-24

## 2019-10-11 RX ORDER — METHADONE HYDROCHLORIDE 10 MG/1
150 TABLET ORAL EVERY 6 HOURS PRN
COMMUNITY
End: 2020-07-01

## 2019-10-11 RX ORDER — PRENATAL VIT/IRON FUM/FOLIC AC 27MG-0.8MG
TABLET ORAL DAILY
COMMUNITY
End: 2019-12-20 | Stop reason: SDUPTHER

## 2019-10-11 NOTE — PROGRESS NOTES
Chief Complaint   Patient presents with   • Initial Prenatal Visit     pregnancy confirmation LMP 19       HPI:  Pt presents for routine prenatal visit.  She has been on methadone comprehensive, however she still continues to use heroin.  Her last use was 2 days ago.  She also has recently used cocaine.  She is more interested in using Subutex during pregnancy and is interested in exploring this option.    Her partner is with her today, he is an active user as well but is seeking treatment.  At our last visit, we had discussed birth control and I had also followed up with her by phone but she was undecided on contraception.  She does not want to be pregnant, but she also is not interested in termination.    She complains of nausea and vomiting, some days unable to keep much down    ROS:  No fever or chills, no cramping or vaginal bleeding    Exam:  See flow sheet  General:  Alert and oriented and no distress  She has some sequelae of heroin use on arms  Neck: no lymphadenopathy or thyromegaly  Lungs: non - labored breathing  Abd:  See flow sheet, fundus nontender  Ext: see flow sheet, non-tender bilateral , no lesions  Neuro: grossly normal    Assessment:/ PLAN:  24 y.o.  at 8w2d    Opiate use disorder in pregnancy-referral to high-risk OB, Dr. hayes to discuss possible Subutex.  I discussed with her the importance of stopping substance use in pregnancy for her health, baby's health, and also to avoid custody issues after delivery.  UDS today.  Tobacco use, has cut down significant from a pack a day to 1/4 pack/day.  I discussed that cutting down is very beneficial to the pregnancy, but of course complete cessation would be most beneficial.  She is not interested in quitting at this time  I discussed hepatitis C in pregnancy.  I discussed that the greatest risk of transmission is at the time of delivery however  delivery is not recommended.  Can breast-feed postpartum otherwise compliant with  substance use treatment.  Nausea and vomiting- diclegis to pharmacy      Pregnancy Risk:  HIGH RISK       Follow up in 1 wk for genetic testing and labs    Olimpia Franco MD  10/11/2019  4:26 PM

## 2019-10-11 NOTE — PROGRESS NOTES
See scanned ultrasound report.         Ultrasound Note     10/11/2019    Patient:  Estephania Pathak      MR#:[id number]    24 y.o.   for dating and viability     Patient Active Problem List   Diagnosis   • Tobacco use   • Missed    • Opiate addiction (CMS/HCC)       [See the scanned report in the media tab for more detail]    Impression    1.  Intrauterine pregnancy at 8w2d  2.  Viable fetus,  bpm  3.  EDC: Estimated Date of Delivery: 20  4. Right ovary 2.46 x 2.27 x 3.43 cm    Relevant comparison data available:  [x] none      Olimpia Franco MD  10/11/2019  4:32 PM

## 2019-10-15 ENCOUNTER — TELEPHONE (OUTPATIENT)
Dept: OBSTETRICS AND GYNECOLOGY | Age: 24
End: 2019-10-15

## 2019-10-15 LAB
AMPHETAMINES UR QL SCN: NEGATIVE NG/ML
BACTERIA UR CULT: NO GROWTH
BACTERIA UR CULT: NORMAL
BARBITURATES UR QL SCN: NEGATIVE NG/ML
BENZODIAZ UR QL: NEGATIVE NG/ML
BZE UR QL: POSITIVE
C TRACH RRNA SPEC QL NAA+PROBE: NEGATIVE
CANNABINOIDS UR QL SCN: NEGATIVE NG/ML
METHADONE UR QL: POSITIVE
N GONORRHOEA RRNA SPEC QL NAA+PROBE: NEGATIVE
OPIATES UR QL: POSITIVE
PCP UR QL: NEGATIVE NG/ML
PROPOXYPH UR QL SCN: NEGATIVE NG/ML
T VAGINALIS DNA SPEC QL NAA+PROBE: NEGATIVE

## 2019-10-15 RX ORDER — METOCLOPRAMIDE 10 MG/1
10 TABLET ORAL EVERY 6 HOURS PRN
Qty: 60 TABLET | Refills: 1 | Status: SHIPPED | OUTPATIENT
Start: 2019-10-15 | End: 2020-03-27 | Stop reason: SDUPTHER

## 2019-10-15 NOTE — TELEPHONE ENCOUNTER
Can we please call in something else for her nausea? Passport does not cover diclegis. I told her to  some bonjesta from the [place] office for the time being     ______________    Sent in reglan, if not working can discuss zofran, will try to avoid phenergan w opiate use    Olimpia Franco MD  10/15/2019  4:14 PM

## 2019-10-17 RX ORDER — MULTIVIT,IRON,MINERALS/LUTEIN
0.8 TABLET ORAL ONCE
Qty: 90 EACH | Refills: 3 | Status: SHIPPED | OUTPATIENT
Start: 2019-10-17 | End: 2019-10-17

## 2019-10-18 DIAGNOSIS — O09.91 SUPERVISION OF HIGH RISK PREGNANCY IN FIRST TRIMESTER: Primary | ICD-10-CM

## 2019-10-20 LAB
ABO GROUP BLD: NORMAL
ALBUMIN SERPL-MCNC: 4.5 G/DL (ref 3.5–5.5)
ALBUMIN/GLOB SERPL: 1.6 {RATIO} (ref 1.2–2.2)
ALP SERPL-CCNC: 57 IU/L (ref 39–117)
ALT SERPL-CCNC: 15 IU/L (ref 0–32)
AST SERPL-CCNC: 19 IU/L (ref 0–40)
BILIRUB SERPL-MCNC: <0.2 MG/DL (ref 0–1.2)
BLD GP AB SCN SERPL QL: NEGATIVE
BUN SERPL-MCNC: 6 MG/DL (ref 6–20)
BUN/CREAT SERPL: 11 (ref 9–23)
CALCIUM SERPL-MCNC: 10.1 MG/DL (ref 8.7–10.2)
CHLORIDE SERPL-SCNC: 99 MMOL/L (ref 96–106)
CO2 SERPL-SCNC: 21 MMOL/L (ref 20–29)
CREAT SERPL-MCNC: 0.57 MG/DL (ref 0.57–1)
ERYTHROCYTE [DISTWIDTH] IN BLOOD BY AUTOMATED COUNT: 15.6 % (ref 12.3–15.4)
GLOBULIN SER CALC-MCNC: 2.9 G/DL (ref 1.5–4.5)
GLUCOSE SERPL-MCNC: 94 MG/DL (ref 65–99)
HBV SURFACE AG SERPL QL IA: NEGATIVE
HCT VFR BLD AUTO: 37 % (ref 34–46.6)
HCV RNA SERPL NAA+PROBE-ACNC: NORMAL IU/ML
HGB BLD-MCNC: 12.4 G/DL (ref 11.1–15.9)
HIV 1+2 AB+HIV1 P24 AG SERPL QL IA: NON REACTIVE
MCH RBC QN AUTO: 26.7 PG (ref 26.6–33)
MCHC RBC AUTO-ENTMCNC: 33.5 G/DL (ref 31.5–35.7)
MCV RBC AUTO: 80 FL (ref 79–97)
PLATELET # BLD AUTO: 341 X10E3/UL (ref 150–450)
POTASSIUM SERPL-SCNC: 4.6 MMOL/L (ref 3.5–5.2)
PROT SERPL-MCNC: 7.4 G/DL (ref 6–8.5)
RBC # BLD AUTO: 4.65 X10E6/UL (ref 3.77–5.28)
RH BLD: POSITIVE
RPR SER QL: NON REACTIVE
RUBV IGG SERPL IA-ACNC: 2.4 INDEX
SODIUM SERPL-SCNC: 135 MMOL/L (ref 134–144)
TEST INFORMATION: NORMAL
VZV IGG SER IA-ACNC: 575 INDEX
WBC # BLD AUTO: 8.3 X10E3/UL (ref 3.4–10.8)

## 2019-10-22 ENCOUNTER — TELEPHONE (OUTPATIENT)
Dept: OBSTETRICS AND GYNECOLOGY | Age: 24
End: 2019-10-22

## 2019-10-22 NOTE — TELEPHONE ENCOUNTER
----- Message from Olimpia Franco MD sent at 10/22/2019 12:24 PM EDT -----  Please let her know that her prenatal labs were normal, blood type A+ and is immune to rubella and varicella  Her hepatitis virus was undetectable and liver function normal which is great.    ----- Message -----  From: Darinel, Reflab Results In  Sent: 10/20/2019   2:35 PM  To: Olimpia Franco MD

## 2019-10-22 NOTE — TELEPHONE ENCOUNTER
Called her back to notify her that her genetic testing results are not back yet because it typically takes 10-14 business days. Informed her we would call her when we get the results

## 2019-10-23 ENCOUNTER — TELEPHONE (OUTPATIENT)
Dept: OBSTETRICS AND GYNECOLOGY | Age: 24
End: 2019-10-23

## 2019-10-23 NOTE — TELEPHONE ENCOUNTER
Called to inform patient. Told her she can stop by the [place] office any time before 4:00 to have her blood re-drawn now that shes 10 weeks.

## 2019-10-23 NOTE — TELEPHONE ENCOUNTER
Katherine from [email or web address] called stating that the patient was under 10 weeks at the time of collection so they will not be able to run her NxGen until she gets redrawn.

## 2019-11-01 ENCOUNTER — ROUTINE PRENATAL (OUTPATIENT)
Dept: OBSTETRICS AND GYNECOLOGY | Age: 24
End: 2019-11-01

## 2019-11-01 VITALS — BODY MASS INDEX: 22.8 KG/M2 | SYSTOLIC BLOOD PRESSURE: 110 MMHG | WEIGHT: 137 LBS | DIASTOLIC BLOOD PRESSURE: 70 MMHG

## 2019-11-01 DIAGNOSIS — Z13.89 SCREENING FOR BLOOD OR PROTEIN IN URINE: ICD-10-CM

## 2019-11-01 DIAGNOSIS — O09.91 HIGH-RISK PREGNANCY IN FIRST TRIMESTER: Primary | ICD-10-CM

## 2019-11-01 DIAGNOSIS — F11.20 UNCOMPLICATED OPIOID DEPENDENCE (HCC): ICD-10-CM

## 2019-11-01 LAB
BILIRUB BLD-MCNC: NEGATIVE MG/DL
CLARITY, POC: CLEAR
COLOR UR: YELLOW
GLUCOSE UR STRIP-MCNC: NEGATIVE MG/DL
KETONES UR QL: NEGATIVE
LEUKOCYTE EST, POC: NEGATIVE
NITRITE UR-MCNC: NEGATIVE MG/ML
PH UR: 7 [PH] (ref 5–8)
PROT UR STRIP-MCNC: ABNORMAL MG/DL
RBC # UR STRIP: NEGATIVE /UL
SP GR UR: 1.01 (ref 1–1.03)
UROBILINOGEN UR QL: NORMAL

## 2019-11-01 PROCEDURE — 99213 OFFICE O/P EST LOW 20 MIN: CPT | Performed by: NURSE PRACTITIONER

## 2019-11-01 NOTE — PROGRESS NOTES
"Chief Complaint   Patient presents with   • Routine Prenatal Visit       HPI: 24 y.o.  at 11w2d presents for routine OB visit. NexGen pending- drawn last week. Estephania voices no complaints. Denies pain, bleeding, lof.  She admits continued substance abuse however \"not doing near as much as I was.\" She is waiting to get into Dr. William's program. Referral has been placed. Pt should expect a call from Dr. William's office by early next week.    Reinforced risks of substance abuse in pregnancy and advised cessation.        Vitals:    19 1040   BP: 110/70   Weight: 62.1 kg (137 lb)       ROS:  GI:  Negative  : Negative  Pulmonary: Negative     A/P  1. Intrauterine pregnancy at 11w2d   2. Pregnancy Risk:  HIGH RISK    Estephania was seen today for routine prenatal visit.    Diagnoses and all orders for this visit:    High-risk pregnancy in first trimester  -     Drug Profile Urine - 9 Drugs - Urine, Clean Catch    Screening for blood or protein in urine  -     POC Urinalysis Dipstick    Uncomplicated opioid dependence (CMS/HCC)  -     Drug Profile Urine - 9 Drugs - Urine, Clean Catch        PLAN:     2 week OB visit with Dr. Salvador Mckeon, APRN  2019 11:20 AM    "

## 2019-11-04 ENCOUNTER — TELEPHONE (OUTPATIENT)
Dept: OBSTETRICS AND GYNECOLOGY | Age: 24
End: 2019-11-04

## 2019-11-04 NOTE — TELEPHONE ENCOUNTER
----- Message from Olimpia Franco MD sent at 11/4/2019 10:42 AM EST -----  Called to notify that her genetic screening was low risk, gender is male. Also her carrier screening is negative.  Was unable to reach or leave voicemail. Please attempt to try her again.    Olimpia Franco MD  11/4/2019  10:42 AM

## 2019-11-06 ENCOUNTER — TELEPHONE (OUTPATIENT)
Dept: OBSTETRICS AND GYNECOLOGY | Age: 24
End: 2019-11-06

## 2019-11-06 NOTE — TELEPHONE ENCOUNTER
CALLED [facility] AND SPOKE WITH HEATHER. THEY HAVE REACHED OUT TO THE PT TO GET HER SET UP WITH THEM BUT STATE SHE DOES NOT HAVE A WORKING NUMBER. GAVE HEATHER PT DADS NUMBER TO TRY TO SEE IF HE CAN GET A HOLD OF PT. HEATHER STATES THEY WILL CONTINUE TO TRY AND REACH PT.  DENNY

## 2019-11-07 ENCOUNTER — TELEPHONE (OUTPATIENT)
Dept: OBSTETRICS AND GYNECOLOGY | Age: 24
End: 2019-11-07

## 2019-11-11 LAB
AMPHETAMINES UR QL SCN: NEGATIVE NG/ML
BARBITURATES UR QL SCN: NEGATIVE NG/ML
BENZODIAZ UR QL: NEGATIVE NG/ML
BZE UR QL: POSITIVE
CANNABINOIDS UR QL SCN: NEGATIVE NG/ML
METHADONE UR QL: POSITIVE
OPIATES UR QL: POSITIVE
PCP UR QL: NEGATIVE NG/ML
PROPOXYPH UR QL SCN: NEGATIVE NG/ML

## 2019-11-13 ENCOUNTER — TELEPHONE (OUTPATIENT)
Dept: OBSTETRICS AND GYNECOLOGY | Age: 24
End: 2019-11-13

## 2019-11-13 NOTE — TELEPHONE ENCOUNTER
I HAVE SENT THE REF TO [facility]. I HAVE REACHED OUT TO PT X2 AND ALSO JUDSON FROM DR[facility] CALLED PT X4 AND AFTER THAT THEY DO NOT REACH OUT ANYMORE TO PT. UNABLE TO GET PT SET UP WITH IS [facility]. MAYBE THIS CAN BE DISCUSSED WITH PT AT NEXT VISIT  DENNY

## 2019-11-22 ENCOUNTER — ROUTINE PRENATAL (OUTPATIENT)
Dept: OBSTETRICS AND GYNECOLOGY | Age: 24
End: 2019-11-22

## 2019-11-22 VITALS — SYSTOLIC BLOOD PRESSURE: 128 MMHG | WEIGHT: 140.4 LBS | DIASTOLIC BLOOD PRESSURE: 88 MMHG | BODY MASS INDEX: 23.36 KG/M2

## 2019-11-22 DIAGNOSIS — Z34.80 SUPERVISION OF OTHER NORMAL PREGNANCY, ANTEPARTUM: ICD-10-CM

## 2019-11-22 DIAGNOSIS — F19.11 HISTORY OF DRUG ABUSE (HCC): ICD-10-CM

## 2019-11-22 DIAGNOSIS — Z72.0 TOBACCO USE: ICD-10-CM

## 2019-11-22 DIAGNOSIS — F11.21 OPIOID DEPENDENCE IN REMISSION (HCC): Primary | ICD-10-CM

## 2019-11-22 PROBLEM — O02.1 MISSED ABORTION: Status: RESOLVED | Noted: 2019-03-14 | Resolved: 2019-11-22

## 2019-11-22 PROCEDURE — 99213 OFFICE O/P EST LOW 20 MIN: CPT | Performed by: OBSTETRICS & GYNECOLOGY

## 2019-11-22 NOTE — PROGRESS NOTES
Chief Complaint   Patient presents with   • Routine Prenatal Visit     14w2d       HPI:  Pt presents for routine prenatal visit. She notes that sometimes her phone has not been working. Has had issues with lapse in car insurance and has lost her license over driving without insurance. Concerned about making it to appts, but her partner does drive.    She is still in methadone program, has been interested in weaning off any opiate. We have discussed and discussed again today recommendation for MAT as less likely to have relapse and safer for the pregnancy. She states she does still continue to use street drugs as well but states she is motivated to quit.    ROS:  No fever or chills, no nausea or vomiting, no contractions, no leg pain, no LE edema, no leaking fluid, no bleeding, no headache, no dysuria  All other systems reviewed and negative    Exam:  See flow sheet  General:  Alert and oriented and no distress  Neck: no lymphadenopathy or thyromegaly  Lungs: non - labored breathing  Abd:  See flow sheet, fundus nontender  Ext: see flow sheet, non-tender bilateral , no lesions  Neuro: grossly normal    Assessment:/ PLAN:  24 y.o.  at 14w2d    Referral to Dr. Herrera, gave card with number to call to set up appt today. She states plans to do so.  Also encouraged sobriety for her partner  UDS today  Hepatitis C noted in history however HCV virus not detected in quant. May not actually have ever been exposed, need to get hep C antibody in future.    Pregnancy Risk:  HIGH RISK     Follow up in 4 wk w anatomy    Olimpia Franco MD  2019  4:07 PM

## 2019-12-20 ENCOUNTER — ROUTINE PRENATAL (OUTPATIENT)
Dept: OBSTETRICS AND GYNECOLOGY | Age: 24
End: 2019-12-20

## 2019-12-20 ENCOUNTER — PROCEDURE VISIT (OUTPATIENT)
Dept: OBSTETRICS AND GYNECOLOGY | Age: 24
End: 2019-12-20

## 2019-12-20 VITALS — BODY MASS INDEX: 24.13 KG/M2 | SYSTOLIC BLOOD PRESSURE: 104 MMHG | DIASTOLIC BLOOD PRESSURE: 64 MMHG | WEIGHT: 145 LBS

## 2019-12-20 DIAGNOSIS — Z72.0 TOBACCO USE: ICD-10-CM

## 2019-12-20 DIAGNOSIS — Z36.86 ENCOUNTER FOR ANTENATAL SCREENING FOR CERVICAL LENGTH: ICD-10-CM

## 2019-12-20 DIAGNOSIS — Z36.89 SCREENING, ANTENATAL, FOR FETAL ANATOMIC SURVEY: ICD-10-CM

## 2019-12-20 DIAGNOSIS — Z13.89 SCREENING FOR HEMATURIA OR PROTEINURIA: Primary | ICD-10-CM

## 2019-12-20 DIAGNOSIS — F19.20: ICD-10-CM

## 2019-12-20 DIAGNOSIS — Z34.80 SUPERVISION OF OTHER NORMAL PREGNANCY, ANTEPARTUM: ICD-10-CM

## 2019-12-20 DIAGNOSIS — F11.21 OPIOID DEPENDENCE IN REMISSION (HCC): ICD-10-CM

## 2019-12-20 DIAGNOSIS — Z72.0 TOBACCO USE: Primary | ICD-10-CM

## 2019-12-20 DIAGNOSIS — F11.288 OPIOID DEPENDENCE WITH OTHER OPIOID-INDUCED DISORDER (HCC): ICD-10-CM

## 2019-12-20 DIAGNOSIS — F19.11 HISTORY OF DRUG ABUSE (HCC): ICD-10-CM

## 2019-12-20 LAB
GLUCOSE UR STRIP-MCNC: NEGATIVE MG/DL
PROT UR STRIP-MCNC: NEGATIVE MG/DL

## 2019-12-20 PROCEDURE — 76805 OB US >/= 14 WKS SNGL FETUS: CPT | Performed by: OBSTETRICS & GYNECOLOGY

## 2019-12-20 PROCEDURE — 76817 TRANSVAGINAL US OBSTETRIC: CPT | Performed by: OBSTETRICS & GYNECOLOGY

## 2019-12-20 PROCEDURE — 99212 OFFICE O/P EST SF 10 MIN: CPT | Performed by: OBSTETRICS & GYNECOLOGY

## 2019-12-20 RX ORDER — PRENATAL VIT/IRON FUM/FOLIC AC 27MG-0.8MG
1 TABLET ORAL DAILY
Qty: 90 TABLET | Refills: 3 | Status: SHIPPED | OUTPATIENT
Start: 2019-12-20 | End: 2021-02-17 | Stop reason: SDUPTHER

## 2019-12-20 NOTE — PROGRESS NOTES
Chief Complaint   Patient presents with   • Routine Prenatal Visit     18w2d, anatomy scan today      Here for ob follow up and anatomy. She notes that she is still on methadone but still using also. She got phone call yesterday from [facility] and they recommend inpatient detox. She is not ready, plans to go after the holiday. She has an outpatient appointment with Dr. Herrera    Normal anatomy and reassuring cervical length today    FU 4 wks    Olimpia Franco MD  12/20/2019  4:09 PM

## 2020-01-20 ENCOUNTER — TELEPHONE (OUTPATIENT)
Dept: OBSTETRICS AND GYNECOLOGY | Age: 25
End: 2020-01-20

## 2020-01-20 NOTE — TELEPHONE ENCOUNTER
Pt had to cancel OB appt for Thursday. She wants to R/S to next week in [place]. Pt states that when she called [facility] they said they cant get her back in until 2-. Pt wants to know if you can work her in sooner with you or if she can see NP at [facility]

## 2020-01-31 ENCOUNTER — TELEPHONE (OUTPATIENT)
Dept: OBSTETRICS AND GYNECOLOGY | Age: 25
End: 2020-01-31

## 2020-01-31 NOTE — TELEPHONE ENCOUNTER
Called patient as she no showed appointment today.  I left a voicemail that I am concerned that she is continuing to use, I am becoming comfortable continuing caring for her pregnancy as she has had unsuccessful attempts to stop her substance abuse, cancelling and no-showing appts    Left voicemail for her to call back.    Olimpia Franco MD  1/31/2020  4:48 PM

## 2020-02-05 ENCOUNTER — TELEPHONE (OUTPATIENT)
Dept: OBSTETRICS AND GYNECOLOGY | Age: 25
End: 2020-02-05

## 2020-02-05 NOTE — TELEPHONE ENCOUNTER
Pt states that she is getting admitted again for the [facility] through [facility] on Friday. She missed her last appt with us on 1/31/20.. When would you like her to be scheduled again with us?

## 2020-02-13 ENCOUNTER — TELEPHONE (OUTPATIENT)
Dept: OBSTETRICS AND GYNECOLOGY | Age: 25
End: 2020-02-13

## 2020-02-13 NOTE — TELEPHONE ENCOUNTER
Pt has hemorrhoids and is wondering if there is anything she can be prescribed for this. She is currently residing at the [facility] here in [place] but somebody there will go to her pharmacy to pick the medication up. Please advise     ____________    Sent in prep H Rx to pharmacy    If she is having constipation we need to address that too    Olimpia Franco MD  2/13/2020   2:12 PM

## 2020-02-14 ENCOUNTER — ROUTINE PRENATAL (OUTPATIENT)
Dept: OBSTETRICS AND GYNECOLOGY | Age: 25
End: 2020-02-14

## 2020-02-14 VITALS — SYSTOLIC BLOOD PRESSURE: 110 MMHG | WEIGHT: 153 LBS | BODY MASS INDEX: 25.46 KG/M2 | DIASTOLIC BLOOD PRESSURE: 68 MMHG

## 2020-02-14 DIAGNOSIS — Z13.89 SCREENING FOR HEMATURIA OR PROTEINURIA: Primary | ICD-10-CM

## 2020-02-14 DIAGNOSIS — F19.11 HISTORY OF DRUG ABUSE (HCC): ICD-10-CM

## 2020-02-14 DIAGNOSIS — F11.21 OPIOID DEPENDENCE IN REMISSION (HCC): ICD-10-CM

## 2020-02-14 DIAGNOSIS — K64.4 EXTERNAL HEMORRHOIDS: ICD-10-CM

## 2020-02-14 DIAGNOSIS — Z34.80 SUPERVISION OF OTHER NORMAL PREGNANCY, ANTEPARTUM: ICD-10-CM

## 2020-02-14 LAB
GLUCOSE UR STRIP-MCNC: NEGATIVE MG/DL
PROT UR STRIP-MCNC: NEGATIVE MG/DL

## 2020-02-14 PROCEDURE — 99214 OFFICE O/P EST MOD 30 MIN: CPT | Performed by: OBSTETRICS & GYNECOLOGY

## 2020-02-14 RX ORDER — ACETAMINOPHEN 500 MG
500 TABLET ORAL EVERY 6 HOURS PRN
Qty: 90 TABLET | Refills: 3 | Status: SHIPPED | OUTPATIENT
Start: 2020-02-14 | End: 2020-05-12 | Stop reason: SDUPTHER

## 2020-02-14 RX ORDER — DOCUSATE SODIUM 100 MG/1
100 CAPSULE, LIQUID FILLED ORAL 2 TIMES DAILY
Qty: 60 CAPSULE | Refills: 1 | Status: SHIPPED | OUTPATIENT
Start: 2020-02-14 | End: 2020-03-12 | Stop reason: SDUPTHER

## 2020-02-14 RX ORDER — POLYETHYLENE GLYCOL 3350 17 G/17G
17 POWDER, FOR SOLUTION ORAL DAILY
Qty: 30 PACKET | Refills: 11 | Status: SHIPPED | OUTPATIENT
Start: 2020-02-14 | End: 2020-04-28

## 2020-02-14 NOTE — PATIENT INSTRUCTIONS
Hemorrhoids  Hemorrhoids are swollen veins in and around the rectum or anus. There are two types of hemorrhoids:  · Internal hemorrhoids. These occur in the veins that are just inside the rectum. They may poke through to the outside and become irritated and painful.  · External hemorrhoids. These occur in the veins that are outside the anus and can be felt as a painful swelling or hard lump near the anus.  Most hemorrhoids do not cause serious problems, and they can be managed with home treatments such as diet and lifestyle changes. If home treatments do not help the symptoms, procedures can be done to shrink or remove the hemorrhoids.  What are the causes?  This condition is caused by increased pressure in the anal area. This pressure may result from various things, including:  · Constipation.  · Straining to have a bowel movement.  · Diarrhea.  · Pregnancy.  · Obesity.  · Sitting for long periods of time.  · Heavy lifting or other activity that causes you to strain.  · Anal sex.  · Riding a bike for a long period of time.  What are the signs or symptoms?  Symptoms of this condition include:  · Pain.  · Anal itching or irritation.  · Rectal bleeding.  · Leakage of stool (feces).  · Anal swelling.  · One or more lumps around the anus.  How is this diagnosed?  This condition can often be diagnosed through a visual exam. Other exams or tests may also be done, such as:  · An exam that involves feeling the rectal area with a gloved hand (digital rectal exam).  · An exam of the anal canal that is done using a small tube (anoscope).  · A blood test, if you have lost a significant amount of blood.  · A test to look inside the colon using a flexible tube with a camera on the end (sigmoidoscopy or colonoscopy).  How is this treated?  This condition can usually be treated at home. However, various procedures may be done if dietary changes, lifestyle changes, and other home treatments do not help your symptoms. These  procedures can help make the hemorrhoids smaller or remove them completely. Some of these procedures involve surgery, and others do not. Common procedures include:  · Rubber band ligation. Rubber bands are placed at the base of the hemorrhoids to cut off their blood supply.  · Sclerotherapy. Medicine is injected into the hemorrhoids to shrink them.  · Infrared coagulation. A type of light energy is used to get rid of the hemorrhoids.  · Hemorrhoidectomy surgery. The hemorrhoids are surgically removed, and the veins that supply them are tied off.  · Stapled hemorrhoidopexy surgery. The surgeon staples the base of the hemorrhoid to the rectal wall.  Follow these instructions at home:  Eating and drinking    · Eat foods that have a lot of fiber in them, such as whole grains, beans, nuts, fruits, and vegetables.  · Ask your health care provider about taking products that have added fiber (fiber supplements).  · Reduce the amount of fat in your diet. You can do this by eating low-fat dairy products, eating less red meat, and avoiding processed foods.  · Drink enough fluid to keep your urine pale yellow.  Managing pain and swelling    · Take warm sitz baths for 20 minutes, 3-4 times a day to ease pain and discomfort. You may do this in a bathtub or using a portable sitz bath that fits over the toilet.  · If directed, apply ice to the affected area. Using ice packs between sitz baths may be helpful.  ? Put ice in a plastic bag.  ? Place a towel between your skin and the bag.  ? Leave the ice on for 20 minutes, 2-3 times a day.  General instructions  · Take over-the-counter and prescription medicines only as told by your health care provider.  · Use medicated creams or suppositories as told.  · Get regular exercise. Ask your health care provider how much and what kind of exercise is best for you. In general, you should do moderate exercise for at least 30 minutes on most days of the week (150 minutes each week). This can  include activities such as walking, biking, or yoga.  · Go to the bathroom when you have the urge to have a bowel movement. Do not wait.  · Avoid straining to have bowel movements.  · Keep the anal area dry and clean. Use wet toilet paper or moist towelettes after a bowel movement.  · Do not sit on the toilet for long periods of time. This increases blood pooling and pain.  · Keep all follow-up visits as told by your health care provider. This is important.  Contact a health care provider if you have:  · Increasing pain and swelling that are not controlled by treatment or medicine.  · Difficulty having a bowel movement, or you are unable to have a bowel movement.  · Pain or inflammation outside the area of the hemorrhoids.  Get help right away if you have:  · Uncontrolled bleeding from your rectum.  Summary  · Hemorrhoids are swollen veins in and around the rectum or anus.  · Most hemorrhoids can be managed with home treatments such as diet and lifestyle changes.  · Taking warm sitz baths can help ease pain and discomfort.  · In severe cases, procedures or surgery can be done to shrink or remove the hemorrhoids.  This information is not intended to replace advice given to you by your health care provider. Make sure you discuss any questions you have with your health care provider.  Document Released: 12/15/2001 Document Revised: 05/09/2019 Document Reviewed: 05/09/2019  [employer] Interactive Patient Education © 2019 [employer]

## 2020-02-14 NOTE — PROGRESS NOTES
Chief Complaint   Patient presents with   • Routine Prenatal Visit     26w2d, pt c/o having a possible hemorroid; bleeding from her bottom        HPI:  Pt presents for routine prenatal visit  She is at the [facility] currently, she wad inpatient at [facility] with the [facility]. She would be interested in leaving [facility] prior to delivery but it has been recommended that she probably stay until delivery.  Notes rectal bleeding and terrible hemorrhoid. Has constipation    ROS:  No fever or chills, no nausea or vomiting, no contractions, no leg pain, no LE edema, no leaking fluid, no bleeding, no headache, no dysuria  All other systems reviewed and negative    Exam:  See flow sheet  General:  Alert and oriented and no distress  Neck: no lymphadenopathy or thyromegaly  Lungs: non - labored breathing  Abd:  See flow sheet, fundus nontender  Ext: see flow sheet, non-tender bilateral , no lesions  Neuro: grossly normal  External rectal exam with current bleeding hemorrhoid    Assessment:/ PLAN:  24 y.o.  at 26w2d    Constipation and hemorrhoid- intensive bowel regimen to pharmacy today. Also preparation H sent  Needs GCT and tdap next visit  Growth next visit  Need to discuss NICU resources regarding [facility] and Dr. William's recommendation of delivery location  UDS today    Also dental letter and orajel today, tylenol PRN dental pain    Pregnancy Risk:  HIGH RISK     Follow up in 2 wks    Olimpia Franco MD  2020  3:44 PM

## 2020-02-18 LAB
AMPHETAMINES UR QL SCN: NEGATIVE NG/ML
BARBITURATES UR QL SCN: NEGATIVE NG/ML
BENZODIAZ UR QL: NEGATIVE NG/ML
BZE UR QL: NEGATIVE NG/ML
CANNABINOIDS UR QL SCN: NEGATIVE NG/ML
METHADONE UR QL: POSITIVE
OPIATES UR QL: NEGATIVE NG/ML
PCP UR QL: NEGATIVE NG/ML
PROPOXYPH UR QL SCN: NEGATIVE NG/ML

## 2020-02-21 ENCOUNTER — TELEPHONE (OUTPATIENT)
Dept: OBSTETRICS AND GYNECOLOGY | Age: 25
End: 2020-02-21

## 2020-02-21 PROBLEM — F17.200 CURRENT EVERY DAY SMOKER: Status: ACTIVE | Noted: 2019-12-26

## 2020-02-21 PROBLEM — F19.10 HIGH RISK PREGNANCY DUE TO MATERNAL DRUG ABUSE IN SECOND TRIMESTER: Status: ACTIVE | Noted: 2020-02-07

## 2020-02-21 PROBLEM — F11.90 METHADONE USE: Status: ACTIVE | Noted: 2019-10-11

## 2020-02-21 PROBLEM — B19.20 HEPATITIS C: Status: ACTIVE | Noted: 2019-12-26

## 2020-02-21 PROBLEM — F19.90 DRUG USE: Status: ACTIVE | Noted: 2019-12-26

## 2020-02-21 PROBLEM — F11.93 ACUTE OPIOID WITHDRAWAL (HCC): Status: ACTIVE | Noted: 2020-01-13

## 2020-02-21 PROBLEM — O99.322 HIGH RISK PREGNANCY DUE TO MATERNAL DRUG ABUSE IN SECOND TRIMESTER: Status: ACTIVE | Noted: 2020-02-07

## 2020-02-21 NOTE — TELEPHONE ENCOUNTER
Spoke with Marvin at [facility], advised to give a comparable PNV covered by [employer] which they processed through.  Patient will need another nausea medication since the Diclegis is not covered.  Do you want her to have Phenergan or do you want me to process a prior authorization due to her history of opiate addiction?    ______________    I do not give phenergan to those on opiates.  Also cannot do zofran because of possible prolonged QT on methadone  Can try a prior auth, otherwise could try antony Franco MD  2/21/2020  4:01 PM

## 2020-02-21 NOTE — TELEPHONE ENCOUNTER
Spoke with patient, she is requesting we call [facility] in [place] to inquire regarding PA's needed on 2 of her medications. Not sure of the nausea medication (?Diclegis), however, stated insurance does not want to cover her PNV either.

## 2020-02-21 NOTE — TELEPHONE ENCOUNTER
PA processed over the phone for Diclegis,  Ref # [id number].  Decision made within 24 hours via fax.

## 2020-02-27 ENCOUNTER — OFFICE VISIT (OUTPATIENT)
Dept: FAMILY MEDICINE CLINIC | Facility: CLINIC | Age: 25
End: 2020-02-27

## 2020-02-27 VITALS
OXYGEN SATURATION: 97 % | SYSTOLIC BLOOD PRESSURE: 110 MMHG | HEIGHT: 65 IN | TEMPERATURE: 97.8 F | BODY MASS INDEX: 30.66 KG/M2 | HEART RATE: 77 BPM | DIASTOLIC BLOOD PRESSURE: 80 MMHG | WEIGHT: 184 LBS

## 2020-02-27 DIAGNOSIS — B36.9 FUNGAL INFECTION OF SKIN: ICD-10-CM

## 2020-02-27 DIAGNOSIS — M54.50 CHRONIC BILATERAL LOW BACK PAIN WITHOUT SCIATICA: ICD-10-CM

## 2020-02-27 DIAGNOSIS — G89.29 CHRONIC BILATERAL LOW BACK PAIN WITHOUT SCIATICA: ICD-10-CM

## 2020-02-27 DIAGNOSIS — B18.2 CHRONIC HEPATITIS C WITHOUT HEPATIC COMA (HCC): ICD-10-CM

## 2020-02-27 DIAGNOSIS — K64.4 EXTERNAL HEMORRHOIDS: Primary | ICD-10-CM

## 2020-02-27 DIAGNOSIS — F17.200 CURRENT EVERY DAY SMOKER: ICD-10-CM

## 2020-02-27 PROCEDURE — 99214 OFFICE O/P EST MOD 30 MIN: CPT | Performed by: NURSE PRACTITIONER

## 2020-02-27 RX ORDER — CLOTRIMAZOLE AND BETAMETHASONE DIPROPIONATE 10; .64 MG/G; MG/G
CREAM TOPICAL 2 TIMES DAILY
Qty: 45 G | Refills: 0 | Status: SHIPPED | OUTPATIENT
Start: 2020-02-27 | End: 2020-04-30

## 2020-02-27 RX ORDER — AMOXICILLIN 500 MG/1
CAPSULE ORAL
COMMUNITY
Start: 2020-02-22 | End: 2020-05-22 | Stop reason: HOSPADM

## 2020-02-27 RX ORDER — CETIRIZINE HYDROCHLORIDE 10 MG/1
10 TABLET ORAL DAILY
Qty: 30 TABLET | Refills: 5 | Status: SHIPPED | OUTPATIENT
Start: 2020-02-27 | End: 2020-05-22 | Stop reason: HOSPADM

## 2020-02-27 NOTE — PROGRESS NOTES
Subjective   Estephania Pathak is a 24 y.o. female.     Chief Complaint   Patient presents with   • Hearing Problem     right more so, mouth infection, on ATB   • Foot Problem     left, spot on heel   • Hemorrhoids        History of Present Illness     Patient is here today to establish care as a new patient.  She hasn't had a primary care provider in a while.  She is currently living [facility] the [facility]    Hepatitis C-would like testing and referral if indicated.  Has reactive testing 2 weeks ago.    Is pregnant, but wants to do once delivers.      Opiod abuse-clean 2/7/2020 on methadone.   27 weeks, due date May 20, 2020    Has spot on left heel that she would like looked at. Noticed about 1-2 weeks ago.      Hearing problem, R ear. Small amount of pain in bilateral ears.    3-4 months when wakes up feels like she cannot hear at all, but then comes back.    Mouth infection-started antibiotic couple days ago.       Hemorrhoids-hasn't had issue in the past.  Hasn't noticed hemorrhoid externally lately.  Has used cream and has given relief from pain and bleeding.       Low back-fractured L5 at 14.  Since being pregnant, pain has increased.  Tylenol isn't giving any relief.     Asking about muscle relaxer, helped in past.  Also asking for something besides Tylenol for pain.  She does state that she does not want to take anything that will possibly hurt the baby    No other complaints today.    The following portions of the patient's history were reviewed and updated as appropriate: allergies, current medications, past family history, past medical history, past social history, past surgical history and problem list.    Past Medical History:   Diagnosis Date   • Hepatitis-C     states not active, never had treatment.     • History of ADHD     AGE 6 TO 12,  TOOK PRESCRIPTION.   • History of vertebral fracture     L5. no surgery, brace and PT   • Hx of drug abuse (CMS/HCC)    •  Kidney stone 12/25/2018   • Opiate addiction (CMS/[facility])        Past Surgical History:   Procedure Laterality Date   • D&C WITH SUCTION N/A 3/15/2019    Procedure: DILATATION AND CURETTAGE WITH SUCTION;  Surgeon: Megan Campos MD;  Location: [facility];  Service: Obstetrics/Gynecology   • KIDNEY STONE SURGERY         Family History   Problem Relation Age of Onset   • Endometriosis Mother    • Malig Hyperthermia Neg Hx    • Breast cancer Neg Hx    • Uterine cancer Neg Hx    • Ovarian cancer Neg Hx    • Colon cancer Neg Hx    • Congenital heart disease Neg Hx        Social History     Socioeconomic History   • Marital status: Single     Spouse name: Not on file   • Number of children: 0   • Years of education: Not on file   • Highest education level: Not on file   Occupational History   • Occupation: UNEMPLOYED   Tobacco Use   • Smoking status: Current Every Day Smoker     Packs/day: 0.25     Years: 10.00     Pack years: 2.50     Types: Cigarettes   • Smokeless tobacco: Never Used   • Tobacco comment: not  interested currently-trying to quit on own   Substance and Sexual Activity   • Alcohol use: No   • Drug use: Not Currently     Types: Marijuana, Hydrocodone, Cocaine, Fentanyl, Heroin     Comment: heroin daily, pain pills-occasionally, a month for marijuana   • Sexual activity: Yes     Partners: Male     Birth control/protection: None, Condom   Social History Narrative    GYN PATIENT SINCE 2009.         Current Outpatient Medications:   •  acetaminophen (TYLENOL) 500 MG tablet, Take 1 tablet by mouth Every 6 (Six) Hours As Needed for Mild Pain ., Disp: 90 tablet, Rfl: 3  •  benzocaine (ORAJEL) 10 % mucosal gel, Apply  to the mouth or throat As Needed (dental pain)., Disp: 9 g, Rfl: 2  •  docusate sodium (COLACE) 100 MG capsule, Take 1 capsule by mouth 2 (Two) Times a Day., Disp: 60 capsule, Rfl: 1  •  doxylamine-pyridoxine (DICLEGIS) 10-10 MG tablet delayed-release EC tablet, Take 1 tablet by mouth Every 6  (Six) Hours As Needed (nausea/vomiting)., Disp: 60 tablet, Rfl: 2  •  methadone (DOLOPHINE) 10 MG tablet, Take 60 mg by mouth Every 6 (Six) Hours As Needed for Moderate Pain ,, Disp: , Rfl:   •  phenylephrine-shark liver oil-mineral oil-petrolatum (PREPARATION H) 0.25-3-14-71.9 % rectal ointment, Insert  into the rectum 2 (Two) Times a Day As Needed for Hemorrhoids., Disp: 28.4 g, Rfl: 3  •  Prenatal Vit-Fe Fumarate-FA (PRENATAL VITAMIN 27-0.8) 27-0.8 MG tablet tablet, Take 1 tablet by mouth Daily., Disp: 90 tablet, Rfl: 3  •  amoxicillin (AMOXIL) 500 MG capsule, , Disp: , Rfl:   •  cetirizine (zyrTEC) 10 MG tablet, Take 1 tablet by mouth Daily., Disp: 30 tablet, Rfl: 5  •  clotrimazole-betamethasone (LOTRISONE) 1-0.05 % cream, Apply  topically to the appropriate area as directed 2 (Two) Times a Day., Disp: 45 g, Rfl: 0  •  Menthol, Topical Analgesic, (BIOFREEZE) 4 % gel, Apply 1 application topically 4 (Four) Times a Day As Needed (pain)., Disp: 118 mL, Rfl: 5  •  metoclopramide (REGLAN) 10 MG tablet, Take 1 tablet by mouth Every 6 (Six) Hours As Needed (nausea/vomiting)., Disp: 60 tablet, Rfl: 1  •  polyethylene glycol (MIRALAX) packet, Take 17 g by mouth Daily., Disp: 30 packet, Rfl: 11  •  Prenat-FeAsp-Meth-FA-DHA w/o A (PRENATE PIXIE) 10-0.6-0.4-200 MG capsule, Take 1 tablet by mouth Daily., Disp: 30 capsule, Rfl: 11  •  Witch Hazel (TUCKS) 50 % pads, Apply 1 applicator topically As Needed (hemorrhoids)., Disp: 40 each, Rfl: 5    Review of Systems   Constitutional: Positive for fatigue. Negative for fever.   HENT: Positive for ear pain and rhinorrhea. Negative for congestion and sore throat.    Respiratory: Negative for cough, shortness of breath and wheezing.    Cardiovascular: Negative for chest pain.   Gastrointestinal: Positive for nausea. Negative for abdominal pain, constipation, diarrhea and vomiting.   Genitourinary: Negative for dysuria and urgency.   Musculoskeletal: Positive for back pain (Chronic in  "nature, has been worse since getting pregnant.).   Skin: Positive for skin lesions (Left inner heel).   Neurological: Negative for dizziness and headache.   Psychiatric/Behavioral: Negative for suicidal ideas and depressed mood. The patient is not nervous/anxious.        Objective   Vitals:    02/27/20 1256   BP: 110/80   Pulse: 77   Temp: 97.8 °F (36.6 °C)   SpO2: 97%   Weight: 83.5 kg (184 lb)   Height: 165.1 cm (65\")     Body mass index is 30.62 kg/m².  Physical Exam   Constitutional: She is oriented to person, place, and time. She appears well-developed and well-nourished.   HENT:   Head: Normocephalic and atraumatic.   Right Ear: cerumen impaction is present.  Left Ear: Tympanic membrane mobility is abnormal.   Nose: Rhinorrhea present. Right sinus exhibits no maxillary sinus tenderness and no frontal sinus tenderness. Left sinus exhibits no maxillary sinus tenderness and no frontal sinus tenderness.   Mouth/Throat: Uvula is midline, oropharynx is clear and moist and mucous membranes are normal. Tonsils are 0 on the right. Tonsils are 0 on the left.   Cardiovascular: Normal rate, regular rhythm and normal heart sounds.   Pulmonary/Chest: Effort normal and breath sounds normal.   Musculoskeletal:        Lumbar back: She exhibits tenderness. She exhibits normal range of motion and no bony tenderness.   Neurological: She is alert and oriented to person, place, and time.   Skin: Rash noted. Rash is papular.        Psychiatric: She has a normal mood and affect. Her behavior is normal. Judgment and thought content normal.         Assessment/Plan   Estephania was seen today for hearing problem, foot problem and hemorrhoids.    Diagnoses and all orders for this visit:    External hemorrhoids    Chronic hepatitis C without hepatic coma (CMS/HCC)    Current every day smoker    Chronic bilateral low back pain without sciatica  -     Ambulatory Referral to Physical Therapy    Fungal infection of skin    Other orders  -     " Menthol, Topical Analgesic, (BIOFREEZE) 4 % gel; Apply 1 application topically 4 (Four) Times a Day As Needed (pain).  -     clotrimazole-betamethasone (LOTRISONE) 1-0.05 % cream; Apply  topically to the appropriate area as directed 2 (Two) Times a Day.  -     Witch Hazel (TUCKS) 50 % pads; Apply 1 applicator topically As Needed (hemorrhoids).  -     cetirizine (zyrTEC) 10 MG tablet; Take 1 tablet by mouth Daily.         Hep C treatment is not recommended during pregnancy.  We can make referral after she delivers  And we do labs.    Hemorrhoids-since she is having no bleeding it is recommended that she eat plenty of fiber, continue Preparation H as needed and may use Tucks pads.    Smoking discussed with patient the increased risk of low birthweight, lung issues, and other issues with baby while continuing to smoke.  Patient verbalizes understanding but is unwilling to take on any plan to help her quit.  She does states she is trying to quit on her own.    Chronic low back pain-discussed with patient that she can continue Tylenol as needed but we do not want to do any other medication.  I would suggest that she use ice and 20-minute increments up to 4 times a day.  Topical medication was called in for her to try.  I discussed with her the need for stretching and made recommendation for physical therapy or chiropractor.  Patient only when a medication, but is agreeable to physical therapy once I told her that that was not safe for the baby.    Fluid behind the ears-start Zyrtec    Fungal infection of the skin-start Lotrisone.  If no resolution follow-up for further evaluation.    Follow-up as needed in office.             Patient Instructions   Hemorrhoids  Hemorrhoids are swollen veins that may develop:  · In the butt (rectum). These are called internal hemorrhoids.  · Around the opening of the butt (anus). These are called external hemorrhoids.  Hemorrhoids can cause pain, itching, or bleeding. Most of the time,  they do not cause serious problems. They usually get better with diet changes, lifestyle changes, and other home treatments.  What are the causes?  This condition may be caused by:  · Having trouble pooping (constipation).  · Pushing hard (straining) to poop.  · Watery poop (diarrhea).  · Pregnancy.  · Being very overweight (obese).  · Sitting for long periods of time.  · Heavy lifting or other activity that causes you to strain.  · Anal sex.  · Riding a bike for a long period of time.  What are the signs or symptoms?  Symptoms of this condition include:  · Pain.  · Itching or soreness in the butt.  · Bleeding from the butt.  · Leaking poop.  · Swelling in the area.  · One or more lumps around the opening of your butt.  How is this diagnosed?  A doctor can often diagnose this condition by looking at the affected area. The doctor may also:  · Do an exam that involves feeling the area with a gloved hand (digital rectal exam).  · Examine the area inside your butt using a small tube (anoscope).  · Order blood tests. This may be done if you have lost a lot of blood.  · Have you get a test that involves looking inside the colon using a flexible tube with a camera on the end (sigmoidoscopy or colonoscopy).  How is this treated?  This condition can usually be treated at home. Your doctor may tell you to change what you eat, make lifestyle changes, or try home treatments. If these do not help, procedures can be done to remove the hemorrhoids or make them smaller. These may involve:  · Placing rubber bands at the base of the hemorrhoids to cut off their blood supply.  · Injecting medicine into the hemorrhoids to shrink them.  · Shining a type of light energy onto the hemorrhoids to cause them to fall off.  · Doing surgery to remove the hemorrhoids or cut off their blood supply.  Follow these instructions at home:  Eating and drinking    · Eat foods that have a lot of fiber in them. These include whole grains, beans, nuts,  fruits, and vegetables.  · Ask your doctor about taking products that have added fiber (fibersupplements).  · Reduce the amount of fat in your diet. You can do this by:  ? Eating low-fat dairy products.  ? Eating less red meat.  ? Avoiding processed foods.  · Drink enough fluid to keep your pee (urine) pale yellow.  Managing pain and swelling    · Take a warm-water bath (sitz bath) for 20 minutes to ease pain. Do this 3-4 times a day. You may do this in a bathtub or using a portable sitz bath that fits over the toilet.  · If told, put ice on the painful area. It may be helpful to use ice between your warm baths.  ? Put ice in a plastic bag.  ? Place a towel between your skin and the bag.  ? Leave the ice on for 20 minutes, 2-3 times a day.  General instructions  · Take over-the-counter and prescription medicines only as told by your doctor.  ? Medicated creams and medicines may be used as told.  · Exercise often. Ask your doctor how much and what kind of exercise is best for you.  · Go to the bathroom when you have the urge to poop. Do not wait.  · Avoid pushing too hard when you poop.  · Keep your butt dry and clean. Use wet toilet paper or moist towelettes after pooping.  · Do not sit on the toilet for a long time.  · Keep all follow-up visits as told by your doctor. This is important.  Contact a doctor if you:  · Have pain and swelling that do not get better with treatment or medicine.  · Have trouble pooping.  · Cannot poop.  · Have pain or swelling outside the area of the hemorrhoids.  Get help right away if you have:  · Bleeding that will not stop.  Summary  · Hemorrhoids are swollen veins in the butt or around the opening of the butt.  · They can cause pain, itching, or bleeding.  · Eat foods that have a lot of fiber in them. These include whole grains, beans, nuts, fruits, and vegetables.  · Take a warm-water bath (sitz bath) for 20 minutes to ease pain. Do this 3-4 times a day.  This information is not  intended to replace advice given to you by your health care provider. Make sure you discuss any questions you have with your health care provider.  Document Released: 09/26/2009 Document Revised: 12/26/2019 Document Reviewed: 05/09/2019  [employer] Interactive Patient Education © 2020 [employer]        Back Pain in Pregnancy  Back pain during pregnancy is common. Back pain may be caused by several factors that are related to changes during your pregnancy.  Follow these instructions at home:  Managing pain, stiffness, and swelling         · If directed, for sudden (acute) back pain, put ice on the painful area.  ? Put ice in a plastic bag.  ? Place a towel between your skin and the bag.  ? Leave the ice on for 20 minutes, 2-3 times per day.  · If directed, apply heat to the affected area before you exercise. Use the heat source that your health care provider recommends, such as a moist heat pack or a heating pad.  ? Place a towel between your skin and the heat source.  ? Leave the heat on for 20-30 minutes.  ? Remove the heat if your skin turns bright red. This is especially important if you are unable to feel pain, heat, or cold. You may have a greater risk of getting burned.  · If directed, massage the affected area.  Activity  · Exercise as told by your health care provider. Gentle exercise is the best way to prevent or manage back pain.  · Listen to your body when lifting. If lifting hurts, ask for help or bend your knees. This uses your leg muscles instead of your back muscles.  · Squat down when picking up something from the floor. Do not bend over.  · Only use bed rest for short periods as told by your health care provider. Bed rest should only be used for the most severe episodes of back pain.  Standing, sitting, and lying down  · Do not  one place for long periods of time.  · Use good posture when sitting. Make sure your head rests over your shoulders and is not hanging forward. Use a pillow on your  lower back if necessary.  · Try sleeping on your side, preferably the left side, with a pregnancy support pillow or 1-2 regular pillows between your legs.  ? If you have back pain after a night's rest, your bed may be too soft.  ? A firm mattress may provide more support for your back during pregnancy.  General instructions  · Do not wear high heels.  · Eat a healthy diet. Try to gain weight within your health care provider's recommendations.  · Use a maternity girdle, elastic sling, or back brace as told by your health care provider.  · Take over-the-counter and prescription medicines only as told by your health care provider.  · Work with a physical therapist or massage therapist to find ways to manage back pain. Acupuncture or massage therapy may be helpful.  · Keep all follow-up visits as told by your health care provider. This is important.  Contact a health care provider if:  · Your back pain interferes with your daily activities.  · You have increasing pain in other parts of your body.  Get help right away if:  · You develop numbness, tingling, weakness, or problems with the use of your arms or legs.  · You develop severe back pain that is not controlled with medicine.  · You have a change in bowel or bladder control.  · You develop shortness of breath, dizziness, or you faint.  · You develop nausea, vomiting, or sweating.  · You have back pain that is a rhythmic, cramping pain similar to labor pains. Labor pain is usually 1-2 minutes apart, lasts for about 1 minute, and involves a bearing down feeling or pressure in your pelvis.  · You have back pain and your water breaks or you have vaginal bleeding.  · You have back pain or numbness that travels down your leg.  · Your back pain developed after you fell.  · You develop pain on one side of your back.  · You see blood in your urine.  · You develop skin blisters in the area of your back pain.  Summary  · Back pain may be caused by several factors that are  related to changes during your pregnancy.  · Follow instructions as told by your health care provider for managing pain, stiffness, and swelling.  · Exercise as told by your health care provider. Gentle exercise is the best way to prevent or manage back pain.  · Take over-the-counter and prescription medicines only as told by your health care provider.  · Keep all follow-up visits as told by your health care provider. This is important.  This information is not intended to replace advice given to you by your health care provider. Make sure you discuss any questions you have with your health care provider.  Document Released: 03/28/2007 Document Revised: 06/05/2019 Document Reviewed: 06/05/2019  "[employer] Interactive Patient Education © 2020 [employer]

## 2020-02-27 NOTE — PATIENT INSTRUCTIONS
Hemorrhoids  Hemorrhoids are swollen veins that may develop:  · In the butt (rectum). These are called internal hemorrhoids.  · Around the opening of the butt (anus). These are called external hemorrhoids.  Hemorrhoids can cause pain, itching, or bleeding. Most of the time, they do not cause serious problems. They usually get better with diet changes, lifestyle changes, and other home treatments.  What are the causes?  This condition may be caused by:  · Having trouble pooping (constipation).  · Pushing hard (straining) to poop.  · Watery poop (diarrhea).  · Pregnancy.  · Being very overweight (obese).  · Sitting for long periods of time.  · Heavy lifting or other activity that causes you to strain.  · Anal sex.  · Riding a bike for a long period of time.  What are the signs or symptoms?  Symptoms of this condition include:  · Pain.  · Itching or soreness in the butt.  · Bleeding from the butt.  · Leaking poop.  · Swelling in the area.  · One or more lumps around the opening of your butt.  How is this diagnosed?  A doctor can often diagnose this condition by looking at the affected area. The doctor may also:  · Do an exam that involves feeling the area with a gloved hand (digital rectal exam).  · Examine the area inside your butt using a small tube (anoscope).  · Order blood tests. This may be done if you have lost a lot of blood.  · Have you get a test that involves looking inside the colon using a flexible tube with a camera on the end (sigmoidoscopy or colonoscopy).  How is this treated?  This condition can usually be treated at home. Your doctor may tell you to change what you eat, make lifestyle changes, or try home treatments. If these do not help, procedures can be done to remove the hemorrhoids or make them smaller. These may involve:  · Placing rubber bands at the base of the hemorrhoids to cut off their blood supply.  · Injecting medicine into the hemorrhoids to shrink them.  · Shining a type of light  energy onto the hemorrhoids to cause them to fall off.  · Doing surgery to remove the hemorrhoids or cut off their blood supply.  Follow these instructions at home:  Eating and drinking    · Eat foods that have a lot of fiber in them. These include whole grains, beans, nuts, fruits, and vegetables.  · Ask your doctor about taking products that have added fiber (fibersupplements).  · Reduce the amount of fat in your diet. You can do this by:  ? Eating low-fat dairy products.  ? Eating less red meat.  ? Avoiding processed foods.  · Drink enough fluid to keep your pee (urine) pale yellow.  Managing pain and swelling    · Take a warm-water bath (sitz bath) for 20 minutes to ease pain. Do this 3-4 times a day. You may do this in a bathtub or using a portable sitz bath that fits over the toilet.  · If told, put ice on the painful area. It may be helpful to use ice between your warm baths.  ? Put ice in a plastic bag.  ? Place a towel between your skin and the bag.  ? Leave the ice on for 20 minutes, 2-3 times a day.  General instructions  · Take over-the-counter and prescription medicines only as told by your doctor.  ? Medicated creams and medicines may be used as told.  · Exercise often. Ask your doctor how much and what kind of exercise is best for you.  · Go to the bathroom when you have the urge to poop. Do not wait.  · Avoid pushing too hard when you poop.  · Keep your butt dry and clean. Use wet toilet paper or moist towelettes after pooping.  · Do not sit on the toilet for a long time.  · Keep all follow-up visits as told by your doctor. This is important.  Contact a doctor if you:  · Have pain and swelling that do not get better with treatment or medicine.  · Have trouble pooping.  · Cannot poop.  · Have pain or swelling outside the area of the hemorrhoids.  Get help right away if you have:  · Bleeding that will not stop.  Summary  · Hemorrhoids are swollen veins in the butt or around the opening of the  butt.  · They can cause pain, itching, or bleeding.  · Eat foods that have a lot of fiber in them. These include whole grains, beans, nuts, fruits, and vegetables.  · Take a warm-water bath (sitz bath) for 20 minutes to ease pain. Do this 3-4 times a day.  This information is not intended to replace advice given to you by your health care provider. Make sure you discuss any questions you have with your health care provider.  Document Released: 09/26/2009 Document Revised: 12/26/2019 Document Reviewed: 05/09/2019  [employer] Interactive Patient Education © 2020 [employer]        Back Pain in Pregnancy  Back pain during pregnancy is common. Back pain may be caused by several factors that are related to changes during your pregnancy.  Follow these instructions at home:  Managing pain, stiffness, and swelling         · If directed, for sudden (acute) back pain, put ice on the painful area.  ? Put ice in a plastic bag.  ? Place a towel between your skin and the bag.  ? Leave the ice on for 20 minutes, 2-3 times per day.  · If directed, apply heat to the affected area before you exercise. Use the heat source that your health care provider recommends, such as a moist heat pack or a heating pad.  ? Place a towel between your skin and the heat source.  ? Leave the heat on for 20-30 minutes.  ? Remove the heat if your skin turns bright red. This is especially important if you are unable to feel pain, heat, or cold. You may have a greater risk of getting burned.  · If directed, massage the affected area.  Activity  · Exercise as told by your health care provider. Gentle exercise is the best way to prevent or manage back pain.  · Listen to your body when lifting. If lifting hurts, ask for help or bend your knees. This uses your leg muscles instead of your back muscles.  · Squat down when picking up something from the floor. Do not bend over.  · Only use bed rest for short periods as told by your health care provider. Bed rest  should only be used for the most severe episodes of back pain.  Standing, sitting, and lying down  · Do not  one place for long periods of time.  · Use good posture when sitting. Make sure your head rests over your shoulders and is not hanging forward. Use a pillow on your lower back if necessary.  · Try sleeping on your side, preferably the left side, with a pregnancy support pillow or 1-2 regular pillows between your legs.  ? If you have back pain after a night's rest, your bed may be too soft.  ? A firm mattress may provide more support for your back during pregnancy.  General instructions  · Do not wear high heels.  · Eat a healthy diet. Try to gain weight within your health care provider's recommendations.  · Use a maternity girdle, elastic sling, or back brace as told by your health care provider.  · Take over-the-counter and prescription medicines only as told by your health care provider.  · Work with a physical therapist or massage therapist to find ways to manage back pain. Acupuncture or massage therapy may be helpful.  · Keep all follow-up visits as told by your health care provider. This is important.  Contact a health care provider if:  · Your back pain interferes with your daily activities.  · You have increasing pain in other parts of your body.  Get help right away if:  · You develop numbness, tingling, weakness, or problems with the use of your arms or legs.  · You develop severe back pain that is not controlled with medicine.  · You have a change in bowel or bladder control.  · You develop shortness of breath, dizziness, or you faint.  · You develop nausea, vomiting, or sweating.  · You have back pain that is a rhythmic, cramping pain similar to labor pains. Labor pain is usually 1-2 minutes apart, lasts for about 1 minute, and involves a bearing down feeling or pressure in your pelvis.  · You have back pain and your water breaks or you have vaginal bleeding.  · You have back pain or  numbness that travels down your leg.  · Your back pain developed after you fell.  · You develop pain on one side of your back.  · You see blood in your urine.  · You develop skin blisters in the area of your back pain.  Summary  · Back pain may be caused by several factors that are related to changes during your pregnancy.  · Follow instructions as told by your health care provider for managing pain, stiffness, and swelling.  · Exercise as told by your health care provider. Gentle exercise is the best way to prevent or manage back pain.  · Take over-the-counter and prescription medicines only as told by your health care provider.  · Keep all follow-up visits as told by your health care provider. This is important.  This information is not intended to replace advice given to you by your health care provider. Make sure you discuss any questions you have with your health care provider.  Document Released: 03/28/2007 Document Revised: 06/05/2019 Document Reviewed: 06/05/2019  [employer] Interactive Patient Education © 2020 [employer]

## 2020-02-28 ENCOUNTER — ROUTINE PRENATAL (OUTPATIENT)
Dept: OBSTETRICS AND GYNECOLOGY | Age: 25
End: 2020-02-28

## 2020-02-28 ENCOUNTER — PROCEDURE VISIT (OUTPATIENT)
Dept: OBSTETRICS AND GYNECOLOGY | Age: 25
End: 2020-02-28

## 2020-02-28 VITALS — DIASTOLIC BLOOD PRESSURE: 60 MMHG | SYSTOLIC BLOOD PRESSURE: 108 MMHG | WEIGHT: 155 LBS | BODY MASS INDEX: 25.79 KG/M2

## 2020-02-28 DIAGNOSIS — F17.200 CURRENT EVERY DAY SMOKER: ICD-10-CM

## 2020-02-28 DIAGNOSIS — B18.2 CHRONIC HEPATITIS C WITHOUT HEPATIC COMA (HCC): Primary | ICD-10-CM

## 2020-02-28 DIAGNOSIS — F19.10 HIGH RISK PREGNANCY DUE TO MATERNAL DRUG ABUSE IN SECOND TRIMESTER (HCC): ICD-10-CM

## 2020-02-28 DIAGNOSIS — O99.322 HIGH RISK PREGNANCY DUE TO MATERNAL DRUG ABUSE IN SECOND TRIMESTER (HCC): ICD-10-CM

## 2020-02-28 DIAGNOSIS — Z13.89 SCREENING FOR HEMATURIA OR PROTEINURIA: Primary | ICD-10-CM

## 2020-02-28 DIAGNOSIS — Z34.80 SUPERVISION OF OTHER NORMAL PREGNANCY, ANTEPARTUM: ICD-10-CM

## 2020-02-28 DIAGNOSIS — F19.11 HISTORY OF DRUG ABUSE (HCC): ICD-10-CM

## 2020-02-28 DIAGNOSIS — Z72.0 TOBACCO USE: ICD-10-CM

## 2020-02-28 DIAGNOSIS — B18.2 CHRONIC HEPATITIS C WITHOUT HEPATIC COMA (HCC): ICD-10-CM

## 2020-02-28 DIAGNOSIS — F19.10 POLYSUBSTANCE ABUSE (HCC): ICD-10-CM

## 2020-02-28 DIAGNOSIS — F11.90 METHADONE USE: ICD-10-CM

## 2020-02-28 PROBLEM — F11.93 ACUTE OPIOID WITHDRAWAL: Status: RESOLVED | Noted: 2020-01-13 | Resolved: 2020-02-28

## 2020-02-28 LAB
GLUCOSE UR STRIP-MCNC: NEGATIVE MG/DL
PROT UR STRIP-MCNC: NEGATIVE MG/DL

## 2020-02-28 PROCEDURE — 76816 OB US FOLLOW-UP PER FETUS: CPT | Performed by: OBSTETRICS & GYNECOLOGY

## 2020-02-28 PROCEDURE — 99214 OFFICE O/P EST MOD 30 MIN: CPT | Performed by: OBSTETRICS & GYNECOLOGY

## 2020-02-28 PROCEDURE — 90715 TDAP VACCINE 7 YRS/> IM: CPT | Performed by: OBSTETRICS & GYNECOLOGY

## 2020-02-28 PROCEDURE — 90471 IMMUNIZATION ADMIN: CPT | Performed by: OBSTETRICS & GYNECOLOGY
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
## 2020-02-29 LAB
ALBUMIN SERPL-MCNC: 4 G/DL (ref 3.9–5)
ALBUMIN/GLOB SERPL: 1.5 {RATIO} (ref 1.2–2.2)
ALP SERPL-CCNC: 83 IU/L (ref 39–117)
ALT SERPL-CCNC: 15 IU/L (ref 0–32)
AST SERPL-CCNC: 18 IU/L (ref 0–40)
BILIRUB SERPL-MCNC: <0.2 MG/DL (ref 0–1.2)
BUN SERPL-MCNC: 6 MG/DL (ref 6–20)
BUN/CREAT SERPL: 11 (ref 9–23)
CALCIUM SERPL-MCNC: 9 MG/DL (ref 8.7–10.2)
CHLORIDE SERPL-SCNC: 103 MMOL/L (ref 96–106)
CO2 SERPL-SCNC: 20 MMOL/L (ref 20–29)
CREAT SERPL-MCNC: 0.53 MG/DL (ref 0.57–1)
ERYTHROCYTE [DISTWIDTH] IN BLOOD BY AUTOMATED COUNT: 13.8 % (ref 11.7–15.4)
GLOBULIN SER CALC-MCNC: 2.7 G/DL (ref 1.5–4.5)
GLUCOSE 1H P 50 G GLC PO SERPL-MCNC: 111 MG/DL (ref 65–139)
GLUCOSE SERPL-MCNC: 110 MG/DL (ref 65–99)
HCT VFR BLD AUTO: 30.9 % (ref 34–46.6)
HGB BLD-MCNC: 10.7 G/DL (ref 11.1–15.9)
HIV 1+2 AB+HIV1 P24 AG SERPL QL IA: NON REACTIVE
MCH RBC QN AUTO: 28.5 PG (ref 26.6–33)
MCHC RBC AUTO-ENTMCNC: 34.6 G/DL (ref 31.5–35.7)
MCV RBC AUTO: 82 FL (ref 79–97)
PLATELET # BLD AUTO: 325 X10E3/UL (ref 150–450)
POTASSIUM SERPL-SCNC: 3.9 MMOL/L (ref 3.5–5.2)
PROT SERPL-MCNC: 6.7 G/DL (ref 6–8.5)
RBC # BLD AUTO: 3.75 X10E6/UL (ref 3.77–5.28)
RPR SER QL: NON REACTIVE
SODIUM SERPL-SCNC: 136 MMOL/L (ref 134–144)
WBC # BLD AUTO: 9.1 X10E3/UL (ref 3.4–10.8)

## 2020-03-02 ENCOUNTER — TELEPHONE (OUTPATIENT)
Dept: OBSTETRICS AND GYNECOLOGY | Age: 25
End: 2020-03-02

## 2020-03-02 NOTE — TELEPHONE ENCOUNTER
----- Message from Olimpia Franco MD sent at 3/2/2020 11:32 AM EST -----  Please notify her that her glucose screen was normal, she does not have gestational diabetes.  Her blood count is slightly low, but I do not want to start oral iron because it might make her constipated and this is already an issue.  Would plan on repeating her blood count in a couple weeks.    ----- Message -----  From: Mary Reid MA  Sent: 2/28/2020   1:52 PM EST  To: Olimpia Franco MD

## 2020-03-02 NOTE — TELEPHONE ENCOUNTER
Notified nurse at [facility] to let hung know that insurance does not cover pregnancy support bands. She will have to pay for one out of pocket

## 2020-03-02 NOTE — TELEPHONE ENCOUNTER
Patient called and is wanting to know if she can get a pregnancy belt ordered to help with the back pain.  Please advise.    _____________    We do not usually order these but will write Rx to [facility] as they would be most likely to have this sort of thing in [place]    Olimpia Franco MD  3/2/2020  1:42 PM

## 2020-03-12 ENCOUNTER — ROUTINE PRENATAL (OUTPATIENT)
Dept: OBSTETRICS AND GYNECOLOGY | Age: 25
End: 2020-03-12

## 2020-03-12 VITALS — WEIGHT: 157 LBS | BODY MASS INDEX: 26.13 KG/M2 | SYSTOLIC BLOOD PRESSURE: 108 MMHG | DIASTOLIC BLOOD PRESSURE: 68 MMHG

## 2020-03-12 DIAGNOSIS — Z13.89 SCREENING FOR HEMATURIA OR PROTEINURIA: Primary | ICD-10-CM

## 2020-03-12 DIAGNOSIS — B18.2 CHRONIC HEPATITIS C WITHOUT HEPATIC COMA (HCC): ICD-10-CM

## 2020-03-12 DIAGNOSIS — O99.322 HIGH RISK PREGNANCY DUE TO MATERNAL DRUG ABUSE IN SECOND TRIMESTER (HCC): ICD-10-CM

## 2020-03-12 DIAGNOSIS — F19.10 HIGH RISK PREGNANCY DUE TO MATERNAL DRUG ABUSE IN SECOND TRIMESTER (HCC): ICD-10-CM

## 2020-03-12 DIAGNOSIS — F19.11 HISTORY OF DRUG ABUSE (HCC): ICD-10-CM

## 2020-03-12 LAB
GLUCOSE UR STRIP-MCNC: NEGATIVE MG/DL
PROT UR STRIP-MCNC: NEGATIVE MG/DL

## 2020-03-12 PROCEDURE — 99213 OFFICE O/P EST LOW 20 MIN: CPT | Performed by: OBSTETRICS & GYNECOLOGY

## 2020-03-12 RX ORDER — FOLIC ACID 0.8 MG
1 TABLET ORAL DAILY
Qty: 90 EACH | Refills: 3 | Status: SHIPPED | OUTPATIENT
Start: 2020-03-12 | End: 2020-05-22 | Stop reason: HOSPADM

## 2020-03-12 RX ORDER — DOCUSATE SODIUM 100 MG/1
100 CAPSULE, LIQUID FILLED ORAL 2 TIMES DAILY
Qty: 60 CAPSULE | Refills: 6 | Status: SHIPPED | OUTPATIENT
Start: 2020-03-12 | End: 2020-05-22 | Stop reason: HOSPADM

## 2020-03-12 NOTE — PROGRESS NOTES
Chief Complaint   Patient presents with   • Routine Prenatal Visit     30w1d, no complaints        HPI:  Pt presents for routine prenatal visit  Complains of legs cramps and back aches  Difficulty sleeping  Constipation is improved but still present    She is getting along better with the other clients at [facility].  Currently, she plans to stay there until delivery as Dr. hayes and I recommend.    ROS:  No fever or chills, no nausea or vomiting, no contractions, no leg pain, no LE edema, no leaking fluid, no bleeding, no headache, no dysuria  All other systems reviewed and negative    Exam:  See flow sheet  General:  Alert and oriented and no distress  Neck: no lymphadenopathy or thyromegaly  Lungs: non - labored breathing  Abd:  See flow sheet, fundus nontender  Ext: see flow sheet, non-tender bilateral , no lesions  Neuro: grossly normal    Assessment:/ PLAN:  24 y.o.  at 30w1d    Opiate abuse in remission on methadone-plan to deliver at [facility].  She is aware of  abstinence syndrome.  I continue to encourage her treatment at [facility]  Muscle cramps-plan to try magnesium.  May also help with constipation  For insomnia can try [employer]  Discussed that we are not going to get a belly band covered by insurance.    Follow up in 2 wks    Olimpia Franco MD  2020  14:28

## 2020-03-17 ENCOUNTER — TELEPHONE (OUTPATIENT)
Dept: OBSTETRICS AND GYNECOLOGY | Age: 25
End: 2020-03-17

## 2020-03-17 NOTE — TELEPHONE ENCOUNTER
Hard to know what exactly is going on with just those 2 issues, would recommend coming in to leave urine sample and see if she may have yeast infection etc  Left VM for her to call back    Olimpia Franco MD  3/17/2020  16:31

## 2020-03-25 ENCOUNTER — TELEPHONE (OUTPATIENT)
Dept: OBSTETRICS AND GYNECOLOGY | Age: 25
End: 2020-03-25

## 2020-03-25 NOTE — TELEPHONE ENCOUNTER
I called the center back-- the nurse has left for the day so I left a message with the  to relay a message to the nurse that I have returned her call regarding the prescription. She will have the nurse call the office tomorrow. If she has any additional questions, I will let you know to get in touch with her.   Thank you.

## 2020-03-25 NOTE — TELEPHONE ENCOUNTER
I would not prescribe zofran while on methadone as it can cause prolonged QT and cardiac arrhythmia when the two are combined. I also would not use phenergan. We can try reglan for nausea/vomiting if needed. Let me know if she would like to talk to me and I can call the nurse back. thx    Olimpia Franco MD  3/25/2020  12:07

## 2020-03-25 NOTE — TELEPHONE ENCOUNTER
The nurse from the [facility] called requesting an rx for Zofran for the patient. Please advise

## 2020-03-27 ENCOUNTER — TELEPHONE (OUTPATIENT)
Dept: OBSTETRICS AND GYNECOLOGY | Age: 25
End: 2020-03-27

## 2020-03-27 ENCOUNTER — ROUTINE PRENATAL (OUTPATIENT)
Dept: OBSTETRICS AND GYNECOLOGY | Age: 25
End: 2020-03-27

## 2020-03-27 VITALS — DIASTOLIC BLOOD PRESSURE: 62 MMHG | SYSTOLIC BLOOD PRESSURE: 106 MMHG | WEIGHT: 162 LBS | BODY MASS INDEX: 26.96 KG/M2

## 2020-03-27 DIAGNOSIS — Z3A.32 32 WEEKS GESTATION OF PREGNANCY: ICD-10-CM

## 2020-03-27 DIAGNOSIS — Z34.80 SUPERVISION OF OTHER NORMAL PREGNANCY, ANTEPARTUM: ICD-10-CM

## 2020-03-27 DIAGNOSIS — Z13.89 SCREENING FOR BLOOD OR PROTEIN IN URINE: Primary | ICD-10-CM

## 2020-03-27 LAB
BILIRUB BLD-MCNC: NEGATIVE MG/DL
CLARITY, POC: CLEAR
COLOR UR: YELLOW
GLUCOSE UR STRIP-MCNC: NEGATIVE MG/DL
KETONES UR QL STRIP: NEGATIVE
LEUKOCYTE ESTERASE URINE, POC: NEGATIVE
NITRITE UR-MCNC: NEGATIVE MG/ML
PH, STOOL: 7.5
PROT UR STRIP-MCNC: NEGATIVE MG/DL
RBC # UR STRIP: NEGATIVE /UL
SP GR UR: 1.01
UROBILINOGEN UR QL: NORMAL

## 2020-03-27 PROCEDURE — 99213 OFFICE O/P EST LOW 20 MIN: CPT | Performed by: OBSTETRICS & GYNECOLOGY

## 2020-03-27 RX ORDER — METOCLOPRAMIDE 10 MG/1
10 TABLET ORAL EVERY 6 HOURS PRN
Qty: 60 TABLET | Refills: 1 | Status: SHIPPED | OUTPATIENT
Start: 2020-03-27 | End: 2020-05-22 | Stop reason: HOSPADM

## 2020-03-27 RX ORDER — BACLOFEN 20 MG
1 TABLET ORAL DAILY
COMMUNITY
Start: 2020-03-12 | End: 2020-05-22 | Stop reason: HOSPADM

## 2020-03-27 NOTE — PROGRESS NOTES
Chief Complaint   Patient presents with   • Routine Prenatal Visit     Patient needs rx for nausea (prefers a dissolvable tablet), something that doesn't contradict the methadone.  Also, would like tucks pads refilled (stated didn't get).     Doing well, requests medication for nausea. Had wanted zofran, but is contraindicated with her methadone use, especially at the high dose due to QT prolongation.  Reglan sent to the pharmacy today.    Her release date from [facility] has been set for April 11. We had discussed staying inpatient until delivery but now it seems that she will be completing the program. Plans to live with her dad afterwards, she states he is supportive and will help her avoid relapse    FU 2 wks with growth    Olimpia Franco MD  3/27/2020  16:41

## 2020-03-29 LAB
BACTERIA UR CULT: NORMAL
BACTERIA UR CULT: NORMAL

## 2020-03-30 ENCOUNTER — TELEPHONE (OUTPATIENT)
Dept: OBSTETRICS AND GYNECOLOGY | Age: 25
End: 2020-03-30

## 2020-03-30 NOTE — TELEPHONE ENCOUNTER
----- Message from Olimpia Franco MD sent at 3/30/2020  1:10 PM EDT -----  Please notify that her urine culture was negative, she does not appear to have a urinary tract infection at this time    ----- Message -----  From: Germaine Mendes MA  Sent: 3/27/2020   3:34 PM EDT  To: Olimpia Franco MD

## 2020-04-01 ENCOUNTER — TELEPHONE (OUTPATIENT)
Dept: OBSTETRICS AND GYNECOLOGY | Age: 25
End: 2020-04-01

## 2020-04-01 NOTE — TELEPHONE ENCOUNTER
Patient called and stated she vomited up her nausea pill and is requesting we call staff at [phone] to give her another pill.  Please advise

## 2020-04-01 NOTE — TELEPHONE ENCOUNTER
Mary can you call, if she vomited up reglan is okay to give another as if she gets any extra is not likely to be harmful    Olimpia Franco MD  4/1/2020  13:42

## 2020-04-07 ENCOUNTER — ROUTINE PRENATAL (OUTPATIENT)
Dept: OBSTETRICS AND GYNECOLOGY | Age: 25
End: 2020-04-07

## 2020-04-07 ENCOUNTER — TELEPHONE (OUTPATIENT)
Dept: OBSTETRICS AND GYNECOLOGY | Age: 25
End: 2020-04-07

## 2020-04-07 ENCOUNTER — PROCEDURE VISIT (OUTPATIENT)
Dept: OBSTETRICS AND GYNECOLOGY | Age: 25
End: 2020-04-07

## 2020-04-07 VITALS — WEIGHT: 163 LBS | BODY MASS INDEX: 27.12 KG/M2 | SYSTOLIC BLOOD PRESSURE: 120 MMHG | DIASTOLIC BLOOD PRESSURE: 72 MMHG

## 2020-04-07 DIAGNOSIS — Z13.89 SCREENING FOR BLOOD OR PROTEIN IN URINE: ICD-10-CM

## 2020-04-07 DIAGNOSIS — Z72.0 TOBACCO USE: ICD-10-CM

## 2020-04-07 DIAGNOSIS — Z34.03 ENCOUNTER FOR PRENATAL CARE IN THIRD TRIMESTER OF FIRST PREGNANCY: Primary | ICD-10-CM

## 2020-04-07 DIAGNOSIS — F19.10 POLYSUBSTANCE ABUSE (HCC): ICD-10-CM

## 2020-04-07 DIAGNOSIS — B18.2 CHRONIC HEPATITIS C WITHOUT HEPATIC COMA (HCC): ICD-10-CM

## 2020-04-07 DIAGNOSIS — F11.90 METHADONE USE: ICD-10-CM

## 2020-04-07 DIAGNOSIS — F17.200 CURRENT EVERY DAY SMOKER: ICD-10-CM

## 2020-04-07 DIAGNOSIS — F11.90 METHADONE USE: Primary | ICD-10-CM

## 2020-04-07 LAB
BILIRUB BLD-MCNC: NEGATIVE MG/DL
GLUCOSE UR STRIP-MCNC: NEGATIVE MG/DL
KETONES UR QL: NEGATIVE
LEUKOCYTE EST, POC: NEGATIVE
NITRITE UR-MCNC: NEGATIVE MG/ML
PH UR: 7 [PH] (ref 5–8)
PROT UR STRIP-MCNC: NEGATIVE MG/DL
RBC # UR STRIP: NEGATIVE /UL
SP GR UR: 1.02 (ref 1–1.03)
UROBILINOGEN UR QL: NORMAL

## 2020-04-07 PROCEDURE — 99213 OFFICE O/P EST LOW 20 MIN: CPT | Performed by: OBSTETRICS & GYNECOLOGY

## 2020-04-07 PROCEDURE — 76816 OB US FOLLOW-UP PER FETUS: CPT | Performed by: OBSTETRICS & GYNECOLOGY

## 2020-04-07 NOTE — TELEPHONE ENCOUNTER
[facility] SPOKE WITH CECILIA AND GAVE HER A VERBAL THAT IF PT THROWS UP HER REGLAN AND THEY SEE IT IN THE TOILET IT IS OK PER DR. RICH TO GIVE HER ANOTHER DOSE  DENNY

## 2020-04-07 NOTE — PROGRESS NOTES
Chief Complaint   Patient presents with   • Routine Prenatal Visit     cc: no c/o       HPI: 24 y.o.  at 33w6d presents for prenatal care - denies ctx, loss of fluid or vag bleeding+FM     Vitals:    20 1032   BP: 120/72   Weight: 73.9 kg (163 lb)       ROS:   Gen neg  GI: neg  CV: neg  Pul: neg  Neuro:neg        A/P  1. Intrauterine pregnancy at 33w6d   2. Pregnancy Risk:  HIGH RISK    Estephania was seen today for routine prenatal visit.    Diagnoses and all orders for this visit:    Encounter for prenatal care in third trimester of first pregnancy    Screening for blood or protein in urine  -     POC Urinalysis Dipstick    Chronic hepatitis C without hepatic coma (CMS/HCC)    Tobacco use    Polysubstance abuse (CMS/HCC)    Methadone use (CMS/HCC)        -----------------------  PLAN:   Return in about 2 weeks (around 2020), or ob check and BPP.   Methadone tx - graduates from rehab this week - plans to live with dad  Tobacco use - enc cessation   PTL warnings   Normal growth today - EFW 54%  Anemia- cont Iron   Weekly BPPS       Megan Campos MD  2020 10:55

## 2020-04-09 ENCOUNTER — TELEPHONE (OUTPATIENT)
Dept: OBSTETRICS AND GYNECOLOGY | Age: 25
End: 2020-04-09

## 2020-04-09 NOTE — TELEPHONE ENCOUNTER
Pt called, 34 weeks pregnant.  She has been using biofreeze for back pain.   A friend told her that was not good to use while being pregnant.  Pt wants verification it's ok or not to use.      [phone]

## 2020-04-14 ENCOUNTER — PROCEDURE VISIT (OUTPATIENT)
Dept: OBSTETRICS AND GYNECOLOGY | Age: 25
End: 2020-04-14

## 2020-04-14 DIAGNOSIS — O99.322 HIGH RISK PREGNANCY DUE TO MATERNAL DRUG ABUSE IN SECOND TRIMESTER (HCC): ICD-10-CM

## 2020-04-14 DIAGNOSIS — Z34.83 ENCOUNTER FOR SUPERVISION OF OTHER NORMAL PREGNANCY IN THIRD TRIMESTER: ICD-10-CM

## 2020-04-14 DIAGNOSIS — F19.10 HIGH RISK PREGNANCY DUE TO MATERNAL DRUG ABUSE IN SECOND TRIMESTER (HCC): ICD-10-CM

## 2020-04-14 DIAGNOSIS — F17.200 CURRENT EVERY DAY SMOKER: ICD-10-CM

## 2020-04-14 DIAGNOSIS — Z72.0 TOBACCO USE: ICD-10-CM

## 2020-04-14 DIAGNOSIS — F11.90 METHADONE USE: Primary | ICD-10-CM

## 2020-04-14 DIAGNOSIS — F19.10 POLYSUBSTANCE ABUSE (HCC): ICD-10-CM

## 2020-04-14 PROCEDURE — 76818 FETAL BIOPHYS PROFILE W/NST: CPT | Performed by: OBSTETRICS & GYNECOLOGY

## 2020-04-14 NOTE — PROGRESS NOTES
Patient Active Problem List    Diagnosis   • Polysubstance abuse (CMS/HCC) [F19.10]   • External hemorrhoids [K64.4]   • High risk pregnancy due to maternal drug abuse in second trimester (CMS/HCC) [O99.322, F19.10]   • Hepatitis C [B19.20]   • Drug use [F19.90]   • Current every day smoker [F17.200]   • Supervision of other normal pregnancy, antepartum [Z34.80]   • Methadone use (CMS/[facility]) [F11.20]   • Tobacco use [Z72.0]       Indication: methadone use in pregnancy    Baseline fetal heart rate 110, reactive, moderate variability, accelerations present. There was not reactivity initially but did have accelerations and reactivity with further monitoring.  no decelerations  irregular contractions, not perceived by patient    Category 1 tracing    Olimpia Franco MD  4/14/2020  13:23

## 2020-04-22 ENCOUNTER — PROCEDURE VISIT (OUTPATIENT)
Dept: OBSTETRICS AND GYNECOLOGY | Age: 25
End: 2020-04-22

## 2020-04-22 ENCOUNTER — ROUTINE PRENATAL (OUTPATIENT)
Dept: OBSTETRICS AND GYNECOLOGY | Age: 25
End: 2020-04-22

## 2020-04-22 VITALS — DIASTOLIC BLOOD PRESSURE: 58 MMHG | BODY MASS INDEX: 27.79 KG/M2 | SYSTOLIC BLOOD PRESSURE: 110 MMHG | WEIGHT: 167 LBS

## 2020-04-22 DIAGNOSIS — F17.200 CURRENT EVERY DAY SMOKER: ICD-10-CM

## 2020-04-22 DIAGNOSIS — F11.90 METHADONE USE: Primary | ICD-10-CM

## 2020-04-22 DIAGNOSIS — Z13.89 SCREENING FOR BLOOD OR PROTEIN IN URINE: ICD-10-CM

## 2020-04-22 DIAGNOSIS — Z34.80 SUPERVISION OF OTHER NORMAL PREGNANCY, ANTEPARTUM: Primary | ICD-10-CM

## 2020-04-22 LAB
BILIRUB BLD-MCNC: NEGATIVE MG/DL
CLARITY, POC: CLEAR
COLOR UR: YELLOW
GLUCOSE UR STRIP-MCNC: NEGATIVE MG/DL
KETONES UR QL: NEGATIVE
LEUKOCYTE EST, POC: NEGATIVE
NITRITE UR-MCNC: NEGATIVE MG/ML
PH UR: 9 [PH] (ref 5–8)
PROT UR STRIP-MCNC: ABNORMAL MG/DL
RBC # UR STRIP: NEGATIVE /UL
SP GR UR: 1.02 (ref 1–1.03)
UROBILINOGEN UR QL: NORMAL

## 2020-04-22 PROCEDURE — 76819 FETAL BIOPHYS PROFIL W/O NST: CPT | Performed by: OBSTETRICS & GYNECOLOGY

## 2020-04-22 PROCEDURE — 99213 OFFICE O/P EST LOW 20 MIN: CPT | Performed by: NURSE PRACTITIONER

## 2020-04-22 NOTE — PROGRESS NOTES
Chief Complaint   Patient presents with   • Routine Prenatal Visit     no c/o       HPI: 24 y.o.  at 36w0d presents for routine OB visit without complaint    BPP reassuring . VASHTI 11.   Estephania denies ctx, lof, bleeding  Reports fetus active  GBS collected today    Vitals:    20 0955   BP: 110/58   Weight: 75.8 kg (167 lb)       ROS: negative      A/P  1. Intrauterine pregnancy at 36w0d   2. Pregnancy Risk:  COMPLICATED    Estephania was seen today for routine prenatal visit.    Diagnoses and all orders for this visit:    Supervision of other normal pregnancy, antepartum    Screening for blood or protein in urine  -     POC Urinalysis Dipstick      PLAN:   PTL warnings discussed  FKC encouraged  1 week BPP and OB visit      JACK Vasquez  2020 10:19

## 2020-04-24 LAB — GP B STREP DNA SPEC QL NAA+PROBE: NEGATIVE

## 2020-04-27 ENCOUNTER — TELEPHONE (OUTPATIENT)
Dept: OBSTETRICS AND GYNECOLOGY | Age: 25
End: 2020-04-27

## 2020-04-27 NOTE — TELEPHONE ENCOUNTER
(Salvador pt)Pt is 36 weeks pregnant and she is requesting a prescription for hemorrhoid cream, her pharmacy is verified.     [phone]

## 2020-04-28 ENCOUNTER — PROCEDURE VISIT (OUTPATIENT)
Dept: OBSTETRICS AND GYNECOLOGY | Age: 25
End: 2020-04-28

## 2020-04-28 ENCOUNTER — TELEPHONE (OUTPATIENT)
Dept: OBSTETRICS AND GYNECOLOGY | Age: 25
End: 2020-04-28

## 2020-04-28 ENCOUNTER — ROUTINE PRENATAL (OUTPATIENT)
Dept: OBSTETRICS AND GYNECOLOGY | Age: 25
End: 2020-04-28

## 2020-04-28 VITALS — SYSTOLIC BLOOD PRESSURE: 130 MMHG | DIASTOLIC BLOOD PRESSURE: 86 MMHG | BODY MASS INDEX: 28.12 KG/M2 | WEIGHT: 169 LBS

## 2020-04-28 DIAGNOSIS — O99.323 HIGH RISK PREGNANCY DUE TO MATERNAL DRUG ABUSE IN THIRD TRIMESTER (HCC): ICD-10-CM

## 2020-04-28 DIAGNOSIS — Z72.0 TOBACCO USE: Primary | ICD-10-CM

## 2020-04-28 DIAGNOSIS — O99.322 HIGH RISK PREGNANCY DUE TO MATERNAL DRUG ABUSE IN SECOND TRIMESTER (HCC): ICD-10-CM

## 2020-04-28 DIAGNOSIS — F19.11 HISTORY OF DRUG ABUSE (HCC): ICD-10-CM

## 2020-04-28 DIAGNOSIS — F11.90 METHADONE USE: ICD-10-CM

## 2020-04-28 DIAGNOSIS — Z13.89 SCREENING FOR HEMATURIA OR PROTEINURIA: Primary | ICD-10-CM

## 2020-04-28 DIAGNOSIS — F19.10 HIGH RISK PREGNANCY DUE TO MATERNAL DRUG ABUSE IN THIRD TRIMESTER (HCC): ICD-10-CM

## 2020-04-28 DIAGNOSIS — F19.10 HIGH RISK PREGNANCY DUE TO MATERNAL DRUG ABUSE IN SECOND TRIMESTER (HCC): ICD-10-CM

## 2020-04-28 LAB
GLUCOSE UR STRIP-MCNC: NEGATIVE MG/DL
PROT UR STRIP-MCNC: NEGATIVE MG/DL

## 2020-04-28 PROCEDURE — 76819 FETAL BIOPHYS PROFIL W/O NST: CPT | Performed by: OBSTETRICS & GYNECOLOGY

## 2020-04-28 PROCEDURE — 99213 OFFICE O/P EST LOW 20 MIN: CPT | Performed by: OBSTETRICS & GYNECOLOGY

## 2020-04-28 RX ORDER — POLYETHYLENE GLYCOL 3350 17 G/17G
17 POWDER, FOR SOLUTION ORAL DAILY
Qty: 30 PACKET | Refills: 11 | Status: SHIPPED | OUTPATIENT
Start: 2020-04-28 | End: 2020-04-30 | Stop reason: SDUPTHER

## 2020-04-28 NOTE — PROGRESS NOTES
Chief Complaint   Patient presents with   • Routine Prenatal Visit     36w6d, no complaints      Doing well but complains of  Hemorrhoid having returned. Does have some constipation. Stopped using the ointment I sent in.  Living with her dad and maintaining sobriety  Mood is good    /86   Wt 76.7 kg (169 lb)   LMP 06/08/2019 (Approximate)   BMI 28.12 kg/m²   Well and in no distress  abd soft and nontender  Has prominent hemorrhoid, does not appear thrombosed   Cervix posterior and closed    A/P  Methadone maint- UDS today. BPP 10/10. Reviewed rooming in at hospital still an option, needs to explore guest dosing closer to the hospital  Reviewed visitor policies at hospital due to COVID 19  Needs to pick peds  Desires nexplanon  Preparation H and miralax to pharmacy    FU 1 wk    Olimpia Franco MD   4/28/2020  12:50

## 2020-04-28 NOTE — TELEPHONE ENCOUNTER
(Salvador pt) Pt is calling requesting a hemorrhoid cream prescription, the first one we called in was not covered by her insurance. I advised patient to call insurance and see which one we will cover. Pt will call back.

## 2020-04-28 NOTE — PROCEDURES
Patient Active Problem List    Diagnosis   • Polysubstance abuse (CMS/HCC) [F19.10]   • External hemorrhoids [K64.4]   • High risk pregnancy due to maternal drug abuse in third trimester (CMS/HCC) [O99.323, F19.10]   • Hepatitis C [B19.20]   • Drug use [F19.90]   • Current every day smoker [F17.200]   • Supervision of other normal pregnancy, antepartum [Z34.80]   • Methadone use (CMS/HCC) [F11.20]   • Tobacco use [Z72.0]       Indication: methadone use in pregnancy    Baseline fetal heart rate 130, reactive, moderate variability, accelerations present  no decelerations  Occasional contraction    35 minute NST    Category 1 tracing    Olimpia Franco MD  4/28/2020  14:03

## 2020-04-29 ENCOUNTER — TELEPHONE (OUTPATIENT)
Dept: OBSTETRICS AND GYNECOLOGY | Age: 25
End: 2020-04-29

## 2020-04-29 NOTE — TELEPHONE ENCOUNTER
Pt needs verification of pregnancy sent to [facility] so she can continue to get rides from them.    [facility]  [phone]  Does not need to be to anyones attention.

## 2020-04-30 RX ORDER — POLYETHYLENE GLYCOL 3350 17 G/17G
17 POWDER, FOR SOLUTION ORAL DAILY
Qty: 20 PACKET | Refills: 11 | Status: SHIPPED | OUTPATIENT
Start: 2020-04-30 | End: 2020-05-22 | Stop reason: HOSPADM

## 2020-05-05 ENCOUNTER — ROUTINE PRENATAL (OUTPATIENT)
Dept: OBSTETRICS AND GYNECOLOGY | Age: 25
End: 2020-05-05

## 2020-05-05 ENCOUNTER — PROCEDURE VISIT (OUTPATIENT)
Dept: OBSTETRICS AND GYNECOLOGY | Age: 25
End: 2020-05-05

## 2020-05-05 VITALS — WEIGHT: 170 LBS | BODY MASS INDEX: 28.29 KG/M2 | DIASTOLIC BLOOD PRESSURE: 70 MMHG | SYSTOLIC BLOOD PRESSURE: 128 MMHG

## 2020-05-05 DIAGNOSIS — Z72.0 TOBACCO USE: Primary | ICD-10-CM

## 2020-05-05 DIAGNOSIS — O99.323 HIGH RISK PREGNANCY DUE TO MATERNAL DRUG ABUSE IN THIRD TRIMESTER (HCC): ICD-10-CM

## 2020-05-05 DIAGNOSIS — Z13.89 SCREENING FOR HEMATURIA OR PROTEINURIA: Primary | ICD-10-CM

## 2020-05-05 DIAGNOSIS — F11.90 METHADONE USE: ICD-10-CM

## 2020-05-05 DIAGNOSIS — F19.10 HIGH RISK PREGNANCY DUE TO MATERNAL DRUG ABUSE IN THIRD TRIMESTER (HCC): ICD-10-CM

## 2020-05-05 LAB
GLUCOSE UR STRIP-MCNC: NEGATIVE MG/DL
PROT UR STRIP-MCNC: ABNORMAL MG/DL

## 2020-05-05 PROCEDURE — 99213 OFFICE O/P EST LOW 20 MIN: CPT | Performed by: OBSTETRICS & GYNECOLOGY

## 2020-05-05 PROCEDURE — 76819 FETAL BIOPHYS PROFIL W/O NST: CPT | Performed by: OBSTETRICS & GYNECOLOGY
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
## 2020-05-07 ENCOUNTER — PREP FOR SURGERY (OUTPATIENT)
Dept: OTHER | Facility: HOSPITAL | Age: 25
End: 2020-05-07

## 2020-05-12 ENCOUNTER — PREP FOR SURGERY (OUTPATIENT)
Dept: OTHER | Facility: HOSPITAL | Age: 25
End: 2020-05-12

## 2020-05-12 ENCOUNTER — PROCEDURE VISIT (OUTPATIENT)
Dept: OBSTETRICS AND GYNECOLOGY | Age: 25
End: 2020-05-12

## 2020-05-12 ENCOUNTER — ROUTINE PRENATAL (OUTPATIENT)
Dept: OBSTETRICS AND GYNECOLOGY | Age: 25
End: 2020-05-12

## 2020-05-12 VITALS — BODY MASS INDEX: 28.96 KG/M2 | WEIGHT: 174 LBS | SYSTOLIC BLOOD PRESSURE: 120 MMHG | DIASTOLIC BLOOD PRESSURE: 80 MMHG

## 2020-05-12 DIAGNOSIS — F11.90 METHADONE USE: Primary | ICD-10-CM

## 2020-05-12 DIAGNOSIS — F11.90 METHADONE USE: ICD-10-CM

## 2020-05-12 DIAGNOSIS — F19.10 POLYSUBSTANCE ABUSE (HCC): ICD-10-CM

## 2020-05-12 DIAGNOSIS — Z34.80 SUPERVISION OF OTHER NORMAL PREGNANCY, ANTEPARTUM: ICD-10-CM

## 2020-05-12 DIAGNOSIS — F17.200 CURRENT EVERY DAY SMOKER: ICD-10-CM

## 2020-05-12 DIAGNOSIS — Z13.89 SCREENING FOR HEMATURIA OR PROTEINURIA: Primary | ICD-10-CM

## 2020-05-12 DIAGNOSIS — B18.2 CHRONIC HEPATITIS C WITHOUT HEPATIC COMA (HCC): ICD-10-CM

## 2020-05-12 LAB
GLUCOSE UR STRIP-MCNC: NEGATIVE MG/DL
PROT UR STRIP-MCNC: NEGATIVE MG/DL

## 2020-05-12 PROCEDURE — 99213 OFFICE O/P EST LOW 20 MIN: CPT | Performed by: OBSTETRICS & GYNECOLOGY

## 2020-05-12 PROCEDURE — 76816 OB US FOLLOW-UP PER FETUS: CPT | Performed by: OBSTETRICS & GYNECOLOGY

## 2020-05-12 PROCEDURE — 76819 FETAL BIOPHYS PROFIL W/O NST: CPT | Performed by: OBSTETRICS & GYNECOLOGY

## 2020-05-12 RX ORDER — MISOPROSTOL 200 UG/1
800 TABLET ORAL AS NEEDED
Status: CANCELLED | OUTPATIENT
Start: 2020-05-12

## 2020-05-12 RX ORDER — ACETAMINOPHEN 325 MG/1
650 TABLET ORAL EVERY 4 HOURS PRN
Status: CANCELLED | OUTPATIENT
Start: 2020-05-12

## 2020-05-12 RX ORDER — PROMETHAZINE HYDROCHLORIDE 12.5 MG/1
12.5 TABLET ORAL EVERY 6 HOURS PRN
COMMUNITY
End: 2020-05-22 | Stop reason: HOSPADM

## 2020-05-12 RX ORDER — ONDANSETRON 2 MG/ML
4 INJECTION INTRAMUSCULAR; INTRAVENOUS EVERY 6 HOURS PRN
Status: CANCELLED | OUTPATIENT
Start: 2020-05-12

## 2020-05-12 RX ORDER — OXYTOCIN-SODIUM CHLORIDE 0.9% IV SOLN 30 UNIT/500ML 30-0.9/5 UT/ML-%
999 SOLUTION INTRAVENOUS ONCE
Status: CANCELLED | OUTPATIENT
Start: 2020-05-12

## 2020-05-12 RX ORDER — OXYTOCIN-SODIUM CHLORIDE 0.9% IV SOLN 30 UNIT/500ML 30-0.9/5 UT/ML-%
250 SOLUTION INTRAVENOUS CONTINUOUS
Status: CANCELLED | OUTPATIENT
Start: 2020-05-12 | End: 2020-05-12

## 2020-05-12 RX ORDER — IBUPROFEN 600 MG/1
600 TABLET ORAL EVERY 6 HOURS PRN
Status: CANCELLED | OUTPATIENT
Start: 2020-05-12

## 2020-05-12 RX ORDER — OXYTOCIN-SODIUM CHLORIDE 0.9% IV SOLN 30 UNIT/500ML 30-0.9/5 UT/ML-%
125 SOLUTION INTRAVENOUS CONTINUOUS PRN
Status: CANCELLED | OUTPATIENT
Start: 2020-05-12

## 2020-05-12 RX ORDER — ONDANSETRON 4 MG/1
4 TABLET, FILM COATED ORAL EVERY 6 HOURS PRN
Status: CANCELLED | OUTPATIENT
Start: 2020-05-12

## 2020-05-12 RX ORDER — SODIUM CHLORIDE 0.9 % (FLUSH) 0.9 %
1-10 SYRINGE (ML) INJECTION AS NEEDED
Status: CANCELLED | OUTPATIENT
Start: 2020-05-12

## 2020-05-12 RX ORDER — CARBOPROST TROMETHAMINE 250 UG/ML
250 INJECTION, SOLUTION INTRAMUSCULAR AS NEEDED
Status: CANCELLED | OUTPATIENT
Start: 2020-05-12

## 2020-05-12 RX ORDER — ACETAMINOPHEN 500 MG
500 TABLET ORAL EVERY 6 HOURS PRN
Qty: 90 TABLET | Refills: 3 | Status: SHIPPED | OUTPATIENT
Start: 2020-05-12 | End: 2020-05-22 | Stop reason: HOSPADM

## 2020-05-12 RX ORDER — METHYLERGONOVINE MALEATE 0.2 MG/ML
200 INJECTION INTRAVENOUS ONCE AS NEEDED
Status: CANCELLED | OUTPATIENT
Start: 2020-05-12

## 2020-05-12 RX ORDER — CALCIUM CARBONATE 200(500)MG
2 TABLET,CHEWABLE ORAL 3 TIMES DAILY PRN
Status: CANCELLED | OUTPATIENT
Start: 2020-05-12

## 2020-05-12 RX ORDER — LIDOCAINE HYDROCHLORIDE 10 MG/ML
5 INJECTION, SOLUTION EPIDURAL; INFILTRATION; INTRACAUDAL; PERINEURAL AS NEEDED
Status: CANCELLED | OUTPATIENT
Start: 2020-05-12

## 2020-05-12 RX ORDER — SODIUM CHLORIDE 0.9 % (FLUSH) 0.9 %
3 SYRINGE (ML) INJECTION EVERY 12 HOURS SCHEDULED
Status: CANCELLED | OUTPATIENT
Start: 2020-05-12

## 2020-05-12 RX ORDER — TERBUTALINE SULFATE 1 MG/ML
0.25 INJECTION, SOLUTION SUBCUTANEOUS AS NEEDED
Status: CANCELLED | OUTPATIENT
Start: 2020-05-12

## 2020-05-18 ENCOUNTER — LAB (OUTPATIENT)
Dept: LAB | Facility: HOSPITAL | Age: 25
End: 2020-05-18

## 2020-05-18 DIAGNOSIS — Z34.80 SUPERVISION OF OTHER NORMAL PREGNANCY, ANTEPARTUM: ICD-10-CM

## 2020-05-18 PROCEDURE — U0004 COV-19 TEST NON-CDC HGH THRU: HCPCS

## 2020-05-19 ENCOUNTER — ANESTHESIA (OUTPATIENT)
Dept: LABOR AND DELIVERY | Facility: HOSPITAL | Age: 25
End: 2020-05-19

## 2020-05-19 ENCOUNTER — PROCEDURE VISIT (OUTPATIENT)
Dept: OBSTETRICS AND GYNECOLOGY | Age: 25
End: 2020-05-19

## 2020-05-19 ENCOUNTER — ROUTINE PRENATAL (OUTPATIENT)
Dept: OBSTETRICS AND GYNECOLOGY | Age: 25
End: 2020-05-19

## 2020-05-19 ENCOUNTER — HOSPITAL ENCOUNTER (INPATIENT)
Facility: HOSPITAL | Age: 25
LOS: 3 days | Discharge: HOME | End: 2020-05-22
Attending: OBSTETRICS & GYNECOLOGY | Admitting: OBSTETRICS & GYNECOLOGY

## 2020-05-19 ENCOUNTER — ANESTHESIA EVENT (OUTPATIENT)
Dept: LABOR AND DELIVERY | Facility: HOSPITAL | Age: 25
End: 2020-05-19

## 2020-05-19 VITALS — SYSTOLIC BLOOD PRESSURE: 108 MMHG | WEIGHT: 175 LBS | DIASTOLIC BLOOD PRESSURE: 68 MMHG | BODY MASS INDEX: 29.12 KG/M2

## 2020-05-19 DIAGNOSIS — F19.90 DRUG USE: ICD-10-CM

## 2020-05-19 DIAGNOSIS — Z72.0 TOBACCO USE: ICD-10-CM

## 2020-05-19 DIAGNOSIS — F11.90 METHADONE USE: Primary | ICD-10-CM

## 2020-05-19 DIAGNOSIS — Z13.89 SCREENING FOR HEMATURIA OR PROTEINURIA: Primary | ICD-10-CM

## 2020-05-19 PROBLEM — O36.8130 DECREASED FETAL MOVEMENTS IN THIRD TRIMESTER: Status: ACTIVE | Noted: 2020-05-19

## 2020-05-19 PROBLEM — Z34.90 PREGNANCY: Status: ACTIVE | Noted: 2020-05-19

## 2020-05-19 LAB
ABO GROUP BLD: NORMAL
AMPHET+METHAMPHET UR QL: NEGATIVE
BARBITURATES UR QL SCN: NEGATIVE
BASOPHILS # BLD AUTO: 0.03 10*3/MM3 (ref 0–0.2)
BASOPHILS NFR BLD AUTO: 0.3 % (ref 0–1.5)
BENZODIAZ UR QL SCN: NEGATIVE
BLD GP AB SCN SERPL QL: NEGATIVE
CANNABINOIDS SERPL QL: NEGATIVE
COCAINE UR QL: NEGATIVE
DEPRECATED RDW RBC AUTO: 42.8 FL (ref 37–54)
EOSINOPHIL # BLD AUTO: 0.06 10*3/MM3 (ref 0–0.4)
EOSINOPHIL NFR BLD AUTO: 0.6 % (ref 0.3–6.2)
ERYTHROCYTE [DISTWIDTH] IN BLOOD BY AUTOMATED COUNT: 14.2 % (ref 12.3–15.4)
GLUCOSE UR STRIP-MCNC: NEGATIVE MG/DL
HCT VFR BLD AUTO: 34.6 % (ref 34–46.6)
HGB BLD-MCNC: 11.9 G/DL (ref 12–15.9)
IMM GRANULOCYTES # BLD AUTO: 0.06 10*3/MM3 (ref 0–0.05)
IMM GRANULOCYTES NFR BLD AUTO: 0.6 % (ref 0–0.5)
LYMPHOCYTES # BLD AUTO: 1.87 10*3/MM3 (ref 0.7–3.1)
LYMPHOCYTES NFR BLD AUTO: 19.9 % (ref 19.6–45.3)
MCH RBC QN AUTO: 28.8 PG (ref 26.6–33)
MCHC RBC AUTO-ENTMCNC: 34.4 G/DL (ref 31.5–35.7)
MCV RBC AUTO: 83.8 FL (ref 79–97)
METHADONE UR QL SCN: POSITIVE
MONOCYTES # BLD AUTO: 0.51 10*3/MM3 (ref 0.1–0.9)
MONOCYTES NFR BLD AUTO: 5.4 % (ref 5–12)
NEUTROPHILS # BLD AUTO: 6.87 10*3/MM3 (ref 1.7–7)
NEUTROPHILS NFR BLD AUTO: 73.2 % (ref 42.7–76)
NRBC BLD AUTO-RTO: 0 /100 WBC (ref 0–0.2)
OPIATES UR QL: NEGATIVE
OXYCODONE UR QL SCN: NEGATIVE
PLATELET # BLD AUTO: 332 10*3/MM3 (ref 140–450)
PMV BLD AUTO: 10.4 FL (ref 6–12)
PROT UR STRIP-MCNC: NEGATIVE MG/DL
RBC # BLD AUTO: 4.13 10*6/MM3 (ref 3.77–5.28)
REF LAB TEST METHOD: NORMAL
RH BLD: POSITIVE
SARS-COV-2 RNA RESP QL NAA+PROBE: NOT DETECTED
SARS-COV-2 RNA RESP QL NAA+PROBE: NOT DETECTED
T&S EXPIRATION DATE: NORMAL
WBC NRBC COR # BLD: 9.4 10*3/MM3 (ref 3.4–10.8)

## 2020-05-19 PROCEDURE — 80307 DRUG TEST PRSMV CHEM ANLYZR: CPT | Performed by: OBSTETRICS & GYNECOLOGY

## 2020-05-19 PROCEDURE — 99213 OFFICE O/P EST LOW 20 MIN: CPT | Performed by: OBSTETRICS & GYNECOLOGY

## 2020-05-19 PROCEDURE — 87635 SARS-COV-2 COVID-19 AMP PRB: CPT | Performed by: OBSTETRICS & GYNECOLOGY

## 2020-05-19 PROCEDURE — 86850 RBC ANTIBODY SCREEN: CPT | Performed by: OBSTETRICS & GYNECOLOGY

## 2020-05-19 PROCEDURE — C1755 CATHETER, INTRASPINAL: HCPCS | Performed by: ANESTHESIOLOGY

## 2020-05-19 PROCEDURE — 85025 COMPLETE CBC W/AUTO DIFF WBC: CPT | Performed by: OBSTETRICS & GYNECOLOGY

## 2020-05-19 PROCEDURE — 86901 BLOOD TYPING SEROLOGIC RH(D): CPT | Performed by: OBSTETRICS & GYNECOLOGY

## 2020-05-19 PROCEDURE — 86900 BLOOD TYPING SEROLOGIC ABO: CPT | Performed by: OBSTETRICS & GYNECOLOGY

## 2020-05-19 PROCEDURE — 3E033VJ INTRODUCTION OF OTHER HORMONE INTO PERIPHERAL VEIN, PERCUTANEOUS APPROACH: ICD-10-PCS | Performed by: OBSTETRICS & GYNECOLOGY

## 2020-05-19 PROCEDURE — 25010000002 ROPIVACAINE PER 1 MG: Performed by: ANESTHESIOLOGY

## 2020-05-19 PROCEDURE — G0480 DRUG TEST DEF 1-7 CLASSES: HCPCS | Performed by: OBSTETRICS & GYNECOLOGY

## 2020-05-19 PROCEDURE — 76819 FETAL BIOPHYS PROFIL W/O NST: CPT | Performed by: OBSTETRICS & GYNECOLOGY

## 2020-05-19 RX ORDER — SODIUM CHLORIDE 0.9 % (FLUSH) 0.9 %
3 SYRINGE (ML) INJECTION EVERY 12 HOURS SCHEDULED
Status: DISCONTINUED | OUTPATIENT
Start: 2020-05-19 | End: 2020-05-20 | Stop reason: HOSPADM

## 2020-05-19 RX ORDER — SODIUM CHLORIDE 0.9 % (FLUSH) 0.9 %
10 SYRINGE (ML) INJECTION AS NEEDED
Status: DISCONTINUED | OUTPATIENT
Start: 2020-05-19 | End: 2020-05-20 | Stop reason: HOSPADM

## 2020-05-19 RX ORDER — LIDOCAINE HYDROCHLORIDE 10 MG/ML
5 INJECTION, SOLUTION EPIDURAL; INFILTRATION; INTRACAUDAL; PERINEURAL AS NEEDED
Status: DISCONTINUED | OUTPATIENT
Start: 2020-05-19 | End: 2020-05-20 | Stop reason: HOSPADM

## 2020-05-19 RX ORDER — ONDANSETRON 2 MG/ML
4 INJECTION INTRAMUSCULAR; INTRAVENOUS ONCE AS NEEDED
Status: DISCONTINUED | OUTPATIENT
Start: 2020-05-19 | End: 2020-05-19

## 2020-05-19 RX ORDER — ACETAMINOPHEN 500 MG
1000 TABLET ORAL ONCE
Status: COMPLETED | OUTPATIENT
Start: 2020-05-19 | End: 2020-05-19

## 2020-05-19 RX ORDER — LIDOCAINE HYDROCHLORIDE AND EPINEPHRINE 15; 5 MG/ML; UG/ML
INJECTION, SOLUTION EPIDURAL AS NEEDED
Status: DISCONTINUED | OUTPATIENT
Start: 2020-05-19 | End: 2020-05-20 | Stop reason: SURG

## 2020-05-19 RX ORDER — DIPHENHYDRAMINE HYDROCHLORIDE 50 MG/ML
12.5 INJECTION INTRAMUSCULAR; INTRAVENOUS EVERY 8 HOURS PRN
Status: DISCONTINUED | OUTPATIENT
Start: 2020-05-19 | End: 2020-05-20 | Stop reason: HOSPADM

## 2020-05-19 RX ORDER — OXYTOCIN-SODIUM CHLORIDE 0.9% IV SOLN 30 UNIT/500ML 30-0.9/5 UT/ML-%
2-20 SOLUTION INTRAVENOUS
Status: DISCONTINUED | OUTPATIENT
Start: 2020-05-20 | End: 2020-05-20

## 2020-05-19 RX ORDER — FAMOTIDINE 10 MG/ML
20 INJECTION, SOLUTION INTRAVENOUS ONCE AS NEEDED
Status: DISCONTINUED | OUTPATIENT
Start: 2020-05-19 | End: 2020-05-20 | Stop reason: HOSPADM

## 2020-05-19 RX ORDER — SODIUM CHLORIDE, SODIUM LACTATE, POTASSIUM CHLORIDE, CALCIUM CHLORIDE 600; 310; 30; 20 MG/100ML; MG/100ML; MG/100ML; MG/100ML
125 INJECTION, SOLUTION INTRAVENOUS CONTINUOUS
Status: DISCONTINUED | OUTPATIENT
Start: 2020-05-19 | End: 2020-05-20

## 2020-05-19 RX ORDER — OXYTOCIN-SODIUM CHLORIDE 0.9% IV SOLN 30 UNIT/500ML 30-0.9/5 UT/ML-%
2-20 SOLUTION INTRAVENOUS
Status: DISCONTINUED | OUTPATIENT
Start: 2020-05-19 | End: 2020-05-19

## 2020-05-19 RX ORDER — ROPIVACAINE HYDROCHLORIDE 2 MG/ML
INJECTION, SOLUTION EPIDURAL; INFILTRATION; PERINEURAL AS NEEDED
Status: DISCONTINUED | OUTPATIENT
Start: 2020-05-19 | End: 2020-05-20 | Stop reason: SURG

## 2020-05-19 RX ORDER — EPHEDRINE SULFATE 50 MG/ML
5 INJECTION, SOLUTION INTRAVENOUS AS NEEDED
Status: DISCONTINUED | OUTPATIENT
Start: 2020-05-19 | End: 2020-05-20 | Stop reason: HOSPADM

## 2020-05-19 RX ADMIN — ROPIVACAINE HYDROCHLORIDE 9 ML/HR: 2 INJECTION, SOLUTION EPIDURAL; INFILTRATION at 19:40

## 2020-05-19 RX ADMIN — DINOPROSTONE 10 MG: 10 INSERT VAGINAL at 13:14

## 2020-05-19 RX ADMIN — SODIUM CHLORIDE, POTASSIUM CHLORIDE, SODIUM LACTATE AND CALCIUM CHLORIDE 999 ML/HR: 600; 310; 30; 20 INJECTION, SOLUTION INTRAVENOUS at 15:05

## 2020-05-19 RX ADMIN — LIDOCAINE HYDROCHLORIDE AND EPINEPHRINE 3 ML: 15; 5 INJECTION, SOLUTION EPIDURAL at 19:32

## 2020-05-19 RX ADMIN — ROPIVACAINE HYDROCHLORIDE 4 ML: 2 INJECTION, SOLUTION EPIDURAL; INFILTRATION at 19:35

## 2020-05-19 RX ADMIN — ROPIVACAINE HYDROCHLORIDE 4 ML: 2 INJECTION, SOLUTION EPIDURAL; INFILTRATION at 19:40

## 2020-05-19 RX ADMIN — ACETAMINOPHEN 1000 MG: 500 TABLET, FILM COATED ORAL at 18:02

## 2020-05-19 RX ADMIN — SODIUM CHLORIDE, POTASSIUM CHLORIDE, SODIUM LACTATE AND CALCIUM CHLORIDE 125 ML/HR: 600; 310; 30; 20 INJECTION, SOLUTION INTRAVENOUS at 20:05

## 2020-05-19 NOTE — PROGRESS NOTES
I was called to see the patient after fetal heart rate deceleration    Patient is lying on her side in no distress.    The patient got up to the bathroom and when she was placed back on the monitor there was noted to be fetal heart rate deceleration to the 70s for 4-1/2 minutes.  Fetal heart rate improved with position change and IV fluid bolus.    Fetal heart rate is back to baseline of 100-110 with moderate variability.    Fetal heart rate tracing prior to the patient going to the bathroom was category 1.  Contractions are rare    Discussed fetal heart rate deceleration with the patient.  We discussed that nonreassuring biophysical profile, tobacco use and methadone use would increase the chance for placental insufficiency.  We discussed increased risk of fetal intolerance of labor and I offered the patient a primary  section.  We discussed risk of  section.  She is at increased risk for difficulty with pain control due to methadone use.  Patient states that she is worried about risk of emergent  section which her sister had.  She would like to discuss with her family and will let us know when she would like to talk again.

## 2020-05-19 NOTE — NURSING NOTE
Dr. Malagon on unit.  MD notified of pt decleration, position change, and IVF bolus.  MD states that she will go in room to check on pt.

## 2020-05-19 NOTE — H&P
[facility]  Obstetric History and Physical    Chief Complaint   Patient presents with   • Scheduled Induction     Patient sent from office for BPP 2/8 and non-reactive NST.  NST ordered and possible IOL.  Patient denied feeling FM since Dr Saleem but felt FM when placed on monitors.  Patient denies cramping or ctx, LOF or vaginal bleeding.         Subjective     Patient is a 24 y.o. female  currently at 39w6d, who was seen in the office today and biophysical profile was nonreassuring with score of 2 out of 8.  VASHTI was normal at 13 but no movement, tone or fetal breathing were seen.  NST was then done which was reported as reactive.  Patient reported that after taking her methadone the baby is often not as active but she is feeling good fetal movement now.  Patient uses fentanyl and methadone.  Her current dose of methadone is 150 mg/day.  She is followed at the [facility].  Patient is also a smoker and has hepatitis C. the patient did have a positive drug screen with Dr. Herrera at the end of April for cocaine.  He did recommend that the patient not breast-feed due to the risk for relapse.      The following portions of the patients history were reviewed and updated as appropriate: past medical history, past surgical history, past family history, past social history and problem list .       Prenatal Information:  Prenatal Results     POC Urine Glucose/Protein     Test Value Reference Range Date Time    Urine Glucose Negative mg/dL Negative, 1000 mg/dL (3+) 20 0924    Urine Protein Negative mg/dL Negative 20 0924          Initial Prenatal Labs     Test Value Reference Range Date Time    Hemoglobin 10.8 g/dL 12.0 - 16.0 20 1424      12.4 g/dL 11.1 - 15.9 10/18/19 1353    Hematocrit 33.2 % 36.0 - 46.0 20 1424      37.0 % 34.0 - 46.6 10/18/19 1353    Platelets 325 x10E3/uL 150 - 450 20 1429      298 10*3/uL 140 - 440 20 1233      361 10*3/uL 140 -  440 01/13/20 1424      341 x10E3/uL 150 - 450 10/18/19 1353    Rubella IgG 2.40 index Immune >0.99 10/18/19 1353    Hepatitis B SAg Nonreactive  Nonreactive 02/07/20 1233      Nonreactive  Nonreactive 01/13/20 1424      Negative  Negative 10/18/19 1353    Hepatitis C Ab REACTIVE  Nonreactive 02/07/20 1233      REACTIVE  Nonreactive 01/13/20 1424    RPR Non Reactive  Non Reactive 02/28/20 1429      Non Reactive  Non Reactive 10/18/19 1353    ABO A   10/18/19 1353    Rh Positive   10/18/19 1353    Antibody Screen Negative   01/13/20 1424      Negative  Negative 10/18/19 1353    HIV Non Reactive  Non Reactive 02/28/20 1429      Nonreactive  Nonreactive 02/07/20 1233      Nonreactive  Nonreactive 01/13/20 1424      Non Reactive  Non Reactive 10/18/19 1353    Urine Culture Final report   03/27/20 1612      Final report   10/11/19 1158    Gonorrhea Negative  Negative 10/11/19 1158    Chlamydia Negative  Negative 10/11/19 1158    TSH              2nd and 3rd Trimester     Test Value Reference Range Date Time    Hemoglobin (repeated) 10.7 g/dL 11.1 - 15.9 02/28/20 1429      10.8 g/dL 12.0 - 16.0 02/07/20 1233    Hematocrit (repeated) 30.9 % 34.0 - 46.6 02/28/20 1429      32.3 % 36.0 - 46.0 02/07/20 1233     mg/dL 65 - 139 02/28/20 1429    Antibody Screen (repeated) Negative   02/07/20 1233    GTT Fasting        GTT 1 Hr        GTT 2 Hr        GTT 3 Hr        Group B Strep Negative  Negative 04/22/20 1040          Drug Screening     Test Value Reference Range Date Time    Amphetamine Screen Negative ng/mL Anxwez=5823 05/12/20 1327      Negative ng/mL Ofdfio=7528 04/28/20 1424      Negative ng/mL Zxnhzp=8706 03/12/20 1531      Negative ng/mL Eqnuef=8052 02/28/20 1549      Negative ng/mL Szfyjk=6399 02/14/20 1603      Negative (arb'U) Negative 02/11/20 0413      Negative (arb'U) Negative 02/10/20 0412      Negative (arb'U) Negative 02/09/20 0247      Negative (arb'U) Negative 02/08/20 0356      Negative (arb'U) Negative  02/07/20 1233      Negative (arb'U) Negative 01/17/20 0448      Negative (arb'U) Negative 01/16/20 0628      Negative (arb'U) Negative 01/15/20 0557      Negative (arb'U) Negative 01/14/20 0711      Negative (arb'U) Negative 01/13/20 1424      Negative ng/mL Jhomhg=4240 12/20/19 1558      Negative ng/mL Iesnno=8277 11/22/19 1611      Negative ng/mL Zfxmnu=4033 11/01/19 1127      Negative ng/mL Mhqkhf=2241 10/11/19 1158    Barbiturate Screen Negative ng/mL Ajnbjy=086 05/12/20 1327      Negative ng/mL Emlyam=995 04/28/20 1424      Negative ng/mL Mzcgrj=158 03/12/20 1531      Negative ng/mL Biwvoc=613 02/28/20 1549      Negative ng/mL Brxqhf=856 02/14/20 1603      Negative ng/mL Bxklgp=652 12/20/19 1558      Negative ng/mL Mntcee=425 11/22/19 1611      Negative ng/mL Hbuafv=943 11/01/19 1127      Negative ng/mL Mfegih=655 10/11/19 1158    Benzodiazepine Screen Negative ng/mL Dysfpd=023 05/12/20 1327      Negative ng/mL Bcafdl=764 04/28/20 1424      Negative ng/mL Raxzpd=174 03/12/20 1531      Negative ng/mL Oadsay=314 02/28/20 1549      Negative ng/mL Jqqann=670 02/14/20 1603      Negative ng/mL Jztkkd=506 12/20/19 1558      Negative ng/mL Jzqzfs=462 11/22/19 1611      Negative ng/mL Lgfxnf=566 11/01/19 1127      Negative ng/mL Ixdkxo=755 10/11/19 1158    Methadone Screen Positive  Jxlcrv=211 05/12/20 1327      Positive  Rlbggn=374 04/28/20 1424      Positive  Ovfcav=747 03/12/20 1531      Positive  Kaeuzl=857 02/28/20 1549      Positive  Ktgafv=507 02/14/20 1603      POSITIVE  Negative 02/07/20 1233      Positive  Ozghzs=377 12/20/19 1558      Positive  Qaddhv=606 11/22/19 1611      Positive  Xpbkor=466 11/01/19 1127      Positive  Mklplr=628 10/11/19 1158    Phencyclidine Screen Negative ng/mL Cutoff=25 05/12/20 1327      Negative ng/mL Cutoff=25 04/28/20 1424      Negative ng/mL Cutoff=25 03/12/20 1531      Negative ng/mL Cutoff=25 02/28/20 1549      Negative ng/mL Cutoff=25 02/14/20 1603      Negative ng/mL  Cutoff=25 12/20/19 1558      Negative ng/mL Cutoff=25 11/22/19 1611      Negative ng/mL Cutoff=25 11/01/19 1127      Negative ng/mL Cutoff=25 10/11/19 1158    Opiates Screen Negative ng/mL Xofygl=441 05/12/20 1327      Negative ng/mL Tsivyp=015 04/28/20 1424      Negative ng/mL Hchdqu=821 03/12/20 1531      Negative ng/mL Dbjtlh=592 02/28/20 1549      Negative ng/mL Ubzczt=543 02/14/20 1603      Positive  Noskdg=754 12/20/19 1558      Positive  Njvsrf=936 11/22/19 1611      Positive  Zupgkr=816 11/01/19 1127      Positive  Hrykkp=404 10/11/19 1158    THC Screen Negative ng/mL Cutoff=50 05/12/20 1327      Negative ng/mL Cutoff=50 04/28/20 1424      Negative ng/mL Cutoff=50 03/12/20 1531      Negative ng/mL Cutoff=50 02/28/20 1549      Negative ng/mL Cutoff=50 02/14/20 1603      Negative  Negative 02/11/20 0413      Negative  Negative 02/10/20 0412      Negative  Negative 02/09/20 0247      Negative  Negative 02/08/20 0356      Negative  Negative 02/07/20 1233      Negative  Negative 01/17/20 0448      Negative  Negative 01/16/20 0628      Negative  Negative 01/15/20 0557      Negative  Negative 01/14/20 0711      Negative  Negative 01/13/20 1424      Negative ng/mL Cutoff=50 12/20/19 1558      Negative ng/mL Cutoff=50 11/22/19 1611      Negative ng/mL Cutoff=50 11/01/19 1127      Negative ng/mL Cutoff=50 10/11/19 1158    Cocaine Screen Negative ng/mL Vbnkzp=244 05/12/20 1327      Negative ng/mL Kgeiky=243 04/28/20 1424      Negative ng/mL Kdxalk=608 03/12/20 1531      Negative ng/mL Aynktu=867 02/28/20 1549      Negative ng/mL Pjxjsj=346 02/14/20 1603      POSITIVE  Negative 02/11/20 0413      POSITIVE  Negative 02/10/20 0412      POSITIVE  Negative 02/09/20 0247      POSITIVE  Negative 02/08/20 0356      POSITIVE  Negative 02/07/20 1233      POSITIVE  Negative 01/17/20 0448      POSITIVE  Negative 01/16/20 0628      POSITIVE  Negative 01/15/20 0557      POSITIVE  Negative 01/14/20 0711      POSITIVE  Negative  01/13/20 1424      Positive  Cacakx=589 12/20/19 1558      Positive  Xcomnr=304 11/22/19 1611      Positive  Ybkkol=221 11/01/19 1127      Positive  Pelnim=092 10/11/19 1158    Propoxyphene Screen Negative ng/mL Qbzrnd=923 05/12/20 1327      Negative ng/mL Kcfiwf=413 04/28/20 1424      Negative ng/mL Fzdlah=110 03/12/20 1531      Negative ng/mL Bjdasu=326 02/28/20 1549      Negative ng/mL Cjkapv=291 02/14/20 1603      Negative ng/mL Gsvogw=770 12/20/19 1558      Negative ng/mL Yyfrul=973 11/22/19 1611      Negative ng/mL Sdgxre=943 11/01/19 1127      Negative ng/mL Haazns=257 10/11/19 1158    Buprenorphine Screen        Methamphetamine Screen        Oxycodone Screen        Tricyclic Antidepressants Screen              Other (Risk screening)     Test Value Reference Range Date Time    Varicella IgG 575 index Immune >165 10/18/19 1353    Parvovirus IgG        CMV IgG        Cystic Fibrosis        Hemoglobin electrophoresis        NIPT        MSAFP-4        AFP (for NTD only)                  External Prenatal Results     Pregnancy Outside Results - Transcribed From Office Records - See Scanned Records For Details     Test Value Date Time    Hgb 10.7 g/dL 02/28/20 1429      10.8 g/dL 02/07/20 1233      10.8 g/dL 01/13/20 1424      12.4 g/dL 10/18/19 1353    Hct 30.9 % 02/28/20 1429      32.3 % 02/07/20 1233      33.2 % 01/13/20 1424      37.0 % 10/18/19 1353    ABO A  10/18/19 1353    Rh Positive  10/18/19 1353    Antibody Screen Negative  02/07/20 1233      Negative  01/13/20 1424      Negative  10/18/19 1353    Glucose Fasting GTT       Glucose Tolerance Test 1 hour       Glucose Tolerance Test 3 hour       Gonorrhea (discrete) Negative  10/11/19 1158    Chlamydia (discrete) Negative  10/11/19 1158    RPR Non Reactive  02/28/20 1429      Non Reactive  10/18/19 1353    VDRL       Syphilis Antibody       Rubella 2.40 index 10/18/19 1353    HBsAg Nonreactive  02/07/20 1233      Nonreactive  01/13/20 1424      Negative   10/18/19 1353    Herpes Simplex Virus PCR       Herpes Simplex VIrus Culture       HIV Non Reactive  02/28/20 1429      Nonreactive  02/07/20 1233      Nonreactive  01/13/20 1424      Non Reactive  10/18/19 1353    Hep C RNA Quant PCR       Hep C Antibody REACTIVE  02/07/20 1233      REACTIVE  01/13/20 1424    AFP       Group B Strep Negative  04/22/20 1040    GBS Susceptibility to Clindamycin       GBS Susceptibility to Erythromycin       Fetal Fibronectin       Genetic Testing, Maternal Blood             Drug Screening     Test Value Date Time    Urine Drug Screen       Amphetamine Screen Negative ng/mL 05/12/20 1327      Negative ng/mL 04/28/20 1424      Negative ng/mL 03/12/20 1531      Negative ng/mL 02/28/20 1549      Negative ng/mL 02/14/20 1603      Negative (arb'U) 02/11/20 0413      Negative (arb'U) 02/10/20 0412      Negative (arb'U) 02/09/20 0247      Negative (arb'U) 02/08/20 0356      Negative (arb'U) 02/07/20 1233      Negative (arb'U) 01/17/20 0448      Negative (arb'U) 01/16/20 0628      Negative (arb'U) 01/15/20 0557      Negative (arb'U) 01/14/20 0711      Negative (arb'U) 01/13/20 1424      Negative ng/mL 12/20/19 1558      Negative ng/mL 11/22/19 1611      Negative ng/mL 11/01/19 1127      Negative ng/mL 10/11/19 1158    Barbiturate Screen Negative ng/mL 05/12/20 1327      Negative ng/mL 04/28/20 1424      Negative ng/mL 03/12/20 1531      Negative ng/mL 02/28/20 1549      Negative ng/mL 02/14/20 1603      Negative ng/mL 12/20/19 1558      Negative ng/mL 11/22/19 1611      Negative ng/mL 11/01/19 1127      Negative ng/mL 10/11/19 1158    Benzodiazepine Screen Negative ng/mL 05/12/20 1327      Negative ng/mL 04/28/20 1424      Negative ng/mL 03/12/20 1531      Negative ng/mL 02/28/20 1549      Negative ng/mL 02/14/20 1603      Negative ng/mL 12/20/19 1558      Negative ng/mL 11/22/19 1611      Negative ng/mL 11/01/19 1127      Negative ng/mL 10/11/19 1158    Methadone Screen Positive  05/12/20  1327      Positive  20 1424      Positive  20 1531      Positive  20 1549      Positive  20 1603      POSITIVE  20 1233      Positive  19 1558      Positive  19 1611      Positive  19 1127      Positive  10/11/19 1158    Phencyclidine Screen Negative ng/mL 20 1327      Negative ng/mL 20 1424      Negative ng/mL 20 1531      Negative ng/mL 20 1549      Negative ng/mL 20 1603      Negative ng/mL 19 1558      Negative ng/mL 19 1611      Negative ng/mL 19 1127      Negative ng/mL 10/11/19 1158    Opiates Screen Negative  20 0413      Negative  02/10/20 0412      Negative  20 0247      Negative  20 0356      Negative  20 1233      Negative  20 0448      Negative  20 0628      Negative  01/15/20 0557      Negative  20 0711      Negative  20 1424    THC Screen       Cocaine Screen       Propoxyphene Screen Negative ng/mL 20 1327      Negative ng/mL 20 1424      Negative ng/mL 20 1531      Negative ng/mL 20 1549      Negative ng/mL 20 1603      Negative ng/mL 19 1558      Negative ng/mL 19 1611      Negative ng/mL 19 1127      Negative ng/mL 10/11/19 1158    Buprenorphine Screen       Methamphetamine Screen       Oxycodone Screen       Tricyclic Antidepressants Screen                    Past OB History:     OB History    Para Term  AB Living   2 0 0 0 1 0   SAB TAB Ectopic Molar Multiple Live Births   1 0 0 0 0 0      # Outcome Date GA Lbr Cyrus/2nd Weight Sex Delivery Anes PTL Lv   2 Current            1 SAB 2019              Obstetric Comments   20 - 33-6 weeks - EFW 54%, AC 74% - JHF         Past Medical History: Past Medical History:   Diagnosis Date   • Hepatitis-C     states not active, never had treatment.     • History of ADHD     AGE 6 TO 12,  TOOK PRESCRIPTION.   • History of vertebral fracture     L5. no  surgery, brace and PT   • Hx of drug abuse (CMS/[facility])    • Kidney stone 12/25/2018   • Opiate addiction (CMS/[facility])       Past Surgical History Past Surgical History:   Procedure Laterality Date   • D&C WITH SUCTION N/A 3/15/2019    Procedure: DILATATION AND CURETTAGE WITH SUCTION;  Surgeon: Megan Campos MD;  Location: [facility];  Service: Obstetrics/Gynecology   • KIDNEY STONE SURGERY     • WISDOM TOOTH EXTRACTION        Family History: Family History   Problem Relation Age of Onset   • Endometriosis Mother    • Malig Hyperthermia Neg Hx    • Breast cancer Neg Hx    • Uterine cancer Neg Hx    • Ovarian cancer Neg Hx    • Colon cancer Neg Hx    • Congenital heart disease Neg Hx       Social History:  reports that she has been smoking cigarettes. She has a 2.50 pack-year smoking history. She has never used smokeless tobacco.   reports that she does not drink alcohol.   reports that she has current or past drug history. Drugs: Marijuana, Hydrocodone, Cocaine, Fentanyl, and Heroin.        General ROS: The patient notes active fetal movement.  No vaginal bleeding loss of fluid or regular contractions.    Objective       Vital Signs Range for the last 24 hours  Temperature: Temp:  [98.4 °F (36.9 °C)] 98.4 °F (36.9 °C)   Temp Source: Temp src: Oral   BP: BP: (108-119)/(68-72) 119/72   Pulse: Heart Rate:  [63-81] 66   Respirations: Resp:  [18] 18   SPO2: SpO2:  [97 %-100 %] 97 %   O2 Amount (l/min):     O2 Devices     Weight: Weight:  [79.4 kg (175 lb)] 79.4 kg (175 lb)     Physical Examination: General appearance - alert, well appearing, and in no distress  Mental status - normal mood, behavior, speech, dress, motor activity, and thought processes  Eyes - sclera anicteric  Abdomen -gravid, size equal to dates and nontender  Pelvic -cervix is 1 to 2 cm 50% and -2 station    Presentation:  Vertex by ultrasound today   Cervix: Exam by: Method: sterile exam per physician(Dr. Malagon)   Dilation: Cervical Dilation (cm):  1-2   Effacement: Cervical Effacement: 50%   Station:         Fetal Heart Rate Assessment category 1 with baseline of 100-1 10.  Maternal heart rate is 72.  Method:     Beats/min:     Baseline:     Variability:     Accels:     Decels:     Tracing Category:       Uterine Assessment   Method:     Frequency (min):     Ctx Count in 10 min:     Duration:     Intensity:     Intensity by IUPC:     Resting Tone:     Resting Tone by IUPC:     [place] Units:       Laboratory Results:   Results from last 7 days   Lab Units 20  1218   WBC 10*3/mm3 9.40   HEMOGLOBIN g/dL 11.9*   HEMATOCRIT % 34.6   PLATELETS 10*3/mm3 332       Estimated fetal weight of 7 pounds 11 ounces at the 60th percentile done on May 12th,  1 week ago.    Assessment/Plan       Decreased fetal movements in third trimester    Tobacco use    Methadone use (CMS/HCC)    High risk pregnancy due to maternal drug abuse in third trimester (CMS/HCC)    Hepatitis C    Drug use    Current every day smoker    Polysubstance abuse (CMS/[facility])    Pregnancy        Assessment:  1.  Intrauterine pregnancy at 39w6d gestation with reactive, reassuring fetal status.    2.  Nonreassuring biophysical profile today with a score of 2 out of 8.  BPP did take place after the patient took her methadone so it may have influenced the fetal movement.  NST is reassuring.  Due to the biophysical profile, methadone use and smoking if there is still concern for placental insufficiency and the patient is term.  Recommend induction of labor.  Cervix is not favorable so will place Cervidil.  Continuous fetal monitoring will be done.  3.  Obstetrical history significant for is non-contributory.  4.  GBS status:   Strep Gp B TEE   Date Value Ref Range Status   2020 Negative Negative Final     Comment:     Centers for Disease Control and Prevention (CDC) and American Congress  of Obstetricians and Gynecologists (ACOG) guidelines for prevention of   group B streptococcal (GBS)  disease specify co-collection of  a vaginal and rectal swab specimen to maximize sensitivity of GBS  detection. Per the CDC and ACOG, swabbing both the lower vagina and  rectum substantially increases the yield of detection compared with  sampling the vagina alone.  Penicillin G, ampicillin, or cefazolin are indicated for intrapartum  prophylaxis of  GBS colonization. Reflex susceptibility  testing should be performed prior to use of clindamycin only on GBS  isolates from penicillin-allergic women who are considered a high risk  for anaphylaxis. Treatment with vancomycin without additional testing  is warranted if resistance to clindamycin is noted.         Plan:  1. fetal and uterine monitoring  continuously and cervical ripening with  Cervidil   2.  Methadone use-there are extensive recommendations from Dr. Herrera on the problem list under methadone use.  We will plan tomorrow to split the patient's dose of methadone during labor and postpartum.  If the patient has a  section we will ask anesthesia to place a block and inject the fascia with Marcaine.  Patient will be given a scheduled Toradol and she will receive Duramorph.  We will try to avoid opiates if the patient has a vaginal delivery.  3. Plan of care has been reviewed with patient.  3.  Risks, benefits of treatment plan have been discussed.  4.  All questions have been answered.        Alejandro Malagon MD  2020  12:46

## 2020-05-19 NOTE — PROGRESS NOTES
Tracing is markedly improved.  Good baseline with several large accelerations and good variability.  Regular mild contractions are noted.    We will continue to monitor.  Continue Cervidil which will be removed at about 1 AM.

## 2020-05-19 NOTE — PROGRESS NOTES
Chief Complaint   Patient presents with   • Routine Prenatal Visit     39w6d, no complaints        HPI:  Pt presents for routine prenatal visit. She just got her methadone dose this morning and then came in for her visit. Baby was moving plenty prior to her dose, but as usual now baby is not moving much.    ROS:  No fever or chills, no nausea or vomiting, no contractions, no leg pain, no LE edema, no leaking fluid, no bleeding, no headache, no dysuria  All other systems reviewed and negative    Exam:  See flow sheet  General:  Alert and oriented and no distress  Neck: no lymphadenopathy or thyromegaly  Lungs: non - labored breathing  Abd:  See flow sheet, fundus nontender  Ext: see flow sheet, non-tender bilateral , no lesions  Neuro: grossly normal    Assessment:/ PLAN:  24 y.o.  at 39w6d    BPP 28 today, just received methadone and is the very likely cause of this. Will send to L&D for evaluation and delivery.  Methadone received today but plan for split dosing while in labor and PP- dosing record reviewed and most recently on 150 mg. See problem list for further recs in labor from Dr. Herrera.  nexplanon postpartum     Follow up postpartum    Olimpia Franco MD  2020  09:49

## 2020-05-19 NOTE — PLAN OF CARE
Problem: Patient Care Overview  Goal: Plan of Care Review  Flowsheets (Taken 2020 1700)  Progress: improving  Plan of Care Reviewed With: patient  Outcome Summary: Pt sent from office for BPP . MD states to place cervidil for 12 hrs and then start pitocin. Fetal decel noted after ambulating to BR around 1500, MD at bedside to discuss possible . FHT reassuring at this time, pt states minimal pain (mostly noted in back), uterine irritability and occasional contractions on monitor. Pt ate lunch today at 1230pm. Plan to continue to monitor and progress toward vaginal delivery as long as fetus tolerates.  Goal: Individualization and Mutuality  Flowsheets (Taken 2020 170)  Patient Specific Goals (Include Timeframe): pain mnaagment, healthy mom and baby  How to Address Anxieties/Fears: none  Patient Specific Interventions: cervidil induction  What Information Would Help Us Give You More Personalized Care?: none  How Would You and/or Your Support Person Like to Participate in Your Care?: none  What Anxieties, Fears, Concerns, or Questions Do You Have About Your Care?: none  Patient Specific Preferences: breastfeeding  Goal: Discharge Needs Assessment  Flowsheets (Taken 2020 1700)  Readmission Within the Last 30 Days: no previous admission in last 30 days     Problem: Labor (Cervical Ripen, Induct, Augment) (Adult,Obstetrics,Pediatric)  Goal: Signs and Symptoms of Listed Potential Problems Will be Absent, Minimized or Managed (Labor)  Flowsheets (Taken 2020 1700)  Problems Assessed (Labor): all  Problems Present (Labor): none

## 2020-05-19 NOTE — ANESTHESIA PREPROCEDURE EVALUATION
Anesthesia Evaluation     Patient summary reviewed and Nursing notes reviewed   no history of anesthetic complications:  NPO Solid Status: > 6 hours  NPO Liquid Status: > 6 hours           Airway   Mallampati: II  TM distance: >3 FB  Neck ROM: full  no difficulty expected and No difficulty expected  Dental - normal exam     Pulmonary - normal exam    breath sounds clear to auscultation  (+) a smoker Current,   (-) rhonchi, decreased breath sounds, wheezes, rales, stridor  Cardiovascular - negative cardio ROS and normal exam    NYHA Classification: I  Rhythm: regular  Rate: normal    (-) murmur, weak pulses, friction rub, systolic click, carotid bruits, JVD, peripheral edema      Neuro/Psych- negative ROS  GI/Hepatic/Renal/Endo    (+)   hepatitis C, renal disease stones,     Musculoskeletal (-) negative ROS    Abdominal  - normal exam    Abdomen: soft.   Substance History   (+) drug use     OB/GYN    (+) Pregnant,         Other - negative ROS                       Anesthesia Plan    ASA 2     intravenous induction     Anesthetic plan, all risks, benefits, and alternatives have been provided, discussed and informed consent has been obtained with: patient.

## 2020-05-20 LAB
ATMOSPHERIC PRESS: 750.4 MMHG
BASE EXCESS BLDCOV CALC-SCNC: -2.8 MMOL/L (ref -30–30)
BDY SITE: ABNORMAL
BDY SITE: NORMAL
BUPRENORPHINE UR QL: NEGATIVE NG/ML
COLLECT TME SMN: ABNORMAL
HCO3 BLDCOV-SCNC: 22.5 MMOL/L
MODALITY: ABNORMAL
MODALITY: NORMAL
NOTE: ABNORMAL
NOTE: NORMAL
PCO2 BLDCOV: 40.1 MM HG (ref 35–51.3)
PH BLDCOV: 7.36 PH UNITS (ref 7.26–7.4)
PO2 BLDCOV: 27.6 MM HG (ref 19–39)
SAO2 % BLDCOA: 49.2 % (ref 92–99)
SAO2 % BLDCOV: ABNORMAL %

## 2020-05-20 PROCEDURE — 88307 TISSUE EXAM BY PATHOLOGIST: CPT

## 2020-05-20 PROCEDURE — 82803 BLOOD GASES ANY COMBINATION: CPT | Performed by: OBSTETRICS & GYNECOLOGY

## 2020-05-20 PROCEDURE — 82803 BLOOD GASES ANY COMBINATION: CPT

## 2020-05-20 PROCEDURE — 0KQM0ZZ REPAIR PERINEUM MUSCLE, OPEN APPROACH: ICD-10-PCS | Performed by: OBSTETRICS & GYNECOLOGY

## 2020-05-20 PROCEDURE — C1755 CATHETER, INTRASPINAL: HCPCS

## 2020-05-20 PROCEDURE — 25010000002 FENTANYL CITRATE (PF) 2500 MCG/50ML SOLUTION: Performed by: ANESTHESIOLOGY

## 2020-05-20 PROCEDURE — 10907ZC DRAINAGE OF AMNIOTIC FLUID, THERAPEUTIC FROM PRODUCTS OF CONCEPTION, VIA NATURAL OR ARTIFICIAL OPENING: ICD-10-PCS | Performed by: OBSTETRICS & GYNECOLOGY

## 2020-05-20 PROCEDURE — 25010000002 ONDANSETRON PER 1 MG: Performed by: OBSTETRICS & GYNECOLOGY

## 2020-05-20 PROCEDURE — 59410 OBSTETRICAL CARE: CPT | Performed by: OBSTETRICS & GYNECOLOGY

## 2020-05-20 PROCEDURE — 25010000002 ROPIVACAINE PER 1 MG: Performed by: ANESTHESIOLOGY

## 2020-05-20 RX ORDER — IBUPROFEN 800 MG/1
800 TABLET ORAL ONCE
Status: COMPLETED | OUTPATIENT
Start: 2020-05-20 | End: 2020-05-20

## 2020-05-20 RX ORDER — ONDANSETRON 2 MG/ML
4 INJECTION INTRAMUSCULAR; INTRAVENOUS EVERY 6 HOURS PRN
Status: DISCONTINUED | OUTPATIENT
Start: 2020-05-20 | End: 2020-05-20

## 2020-05-20 RX ORDER — METHYLERGONOVINE MALEATE 0.2 MG/ML
200 INJECTION INTRAVENOUS ONCE AS NEEDED
Status: DISCONTINUED | OUTPATIENT
Start: 2020-05-20 | End: 2020-05-20 | Stop reason: HOSPADM

## 2020-05-20 RX ORDER — ERYTHROMYCIN 5 MG/G
OINTMENT OPHTHALMIC
Status: DISPENSED
Start: 2020-05-20 | End: 2020-05-20

## 2020-05-20 RX ORDER — BUPIVACAINE HYDROCHLORIDE 2.5 MG/ML
INJECTION, SOLUTION EPIDURAL; INFILTRATION; INTRACAUDAL AS NEEDED
Status: DISCONTINUED | OUTPATIENT
Start: 2020-05-20 | End: 2020-05-20 | Stop reason: SURG

## 2020-05-20 RX ORDER — OXYTOCIN-SODIUM CHLORIDE 0.9% IV SOLN 30 UNIT/500ML 30-0.9/5 UT/ML-%
125 SOLUTION INTRAVENOUS CONTINUOUS PRN
Status: COMPLETED | OUTPATIENT
Start: 2020-05-20 | End: 2020-05-20

## 2020-05-20 RX ORDER — CARBOPROST TROMETHAMINE 250 UG/ML
250 INJECTION, SOLUTION INTRAMUSCULAR AS NEEDED
Status: DISCONTINUED | OUTPATIENT
Start: 2020-05-20 | End: 2020-05-20 | Stop reason: HOSPADM

## 2020-05-20 RX ORDER — SODIUM CHLORIDE 0.9 % (FLUSH) 0.9 %
1-10 SYRINGE (ML) INJECTION AS NEEDED
Status: DISCONTINUED | OUTPATIENT
Start: 2020-05-20 | End: 2020-05-20

## 2020-05-20 RX ORDER — ACETAMINOPHEN 500 MG
1000 TABLET ORAL ONCE
Status: COMPLETED | OUTPATIENT
Start: 2020-05-20 | End: 2020-05-20

## 2020-05-20 RX ORDER — LANOLIN
CREAM (ML) TOPICAL
Status: DISCONTINUED | OUTPATIENT
Start: 2020-05-20 | End: 2020-05-22 | Stop reason: HOSPADM

## 2020-05-20 RX ORDER — IBUPROFEN 600 MG/1
600 TABLET ORAL EVERY 6 HOURS
Status: DISCONTINUED | OUTPATIENT
Start: 2020-05-20 | End: 2020-05-22 | Stop reason: HOSPADM

## 2020-05-20 RX ORDER — OXYTOCIN-SODIUM CHLORIDE 0.9% IV SOLN 30 UNIT/500ML 30-0.9/5 UT/ML-%
250 SOLUTION INTRAVENOUS CONTINUOUS
Status: ACTIVE | OUTPATIENT
Start: 2020-05-20 | End: 2020-05-20

## 2020-05-20 RX ORDER — SUFENTANIL CITRATE 50 UG/ML
INJECTION EPIDURAL; INTRAVENOUS AS NEEDED
Status: DISCONTINUED | OUTPATIENT
Start: 2020-05-20 | End: 2020-05-20 | Stop reason: SURG

## 2020-05-20 RX ORDER — PRENATAL VIT NO.126/IRON/FOLIC 28MG-0.8MG
1 TABLET ORAL DAILY
Status: DISCONTINUED | OUTPATIENT
Start: 2020-05-21 | End: 2020-05-22 | Stop reason: HOSPADM

## 2020-05-20 RX ORDER — ZOLPIDEM TARTRATE 5 MG/1
5 TABLET ORAL NIGHTLY PRN
Status: DISCONTINUED | OUTPATIENT
Start: 2020-05-20 | End: 2020-05-22 | Stop reason: HOSPADM

## 2020-05-20 RX ORDER — OXYTOCIN-SODIUM CHLORIDE 0.9% IV SOLN 30 UNIT/500ML 30-0.9/5 UT/ML-%
999 SOLUTION INTRAVENOUS ONCE
Status: COMPLETED | OUTPATIENT
Start: 2020-05-20 | End: 2020-05-20

## 2020-05-20 RX ORDER — BUPIVACAINE HYDROCHLORIDE AND EPINEPHRINE 5; 5 MG/ML; UG/ML
INJECTION, SOLUTION EPIDURAL; INTRACAUDAL; PERINEURAL
Status: DISPENSED
Start: 2020-05-20 | End: 2020-05-20

## 2020-05-20 RX ORDER — MISOPROSTOL 200 UG/1
800 TABLET ORAL AS NEEDED
Status: DISCONTINUED | OUTPATIENT
Start: 2020-05-20 | End: 2020-05-20 | Stop reason: HOSPADM

## 2020-05-20 RX ORDER — DOCUSATE SODIUM 100 MG/1
100 CAPSULE, LIQUID FILLED ORAL 2 TIMES DAILY
Status: DISCONTINUED | OUTPATIENT
Start: 2020-05-20 | End: 2020-05-22 | Stop reason: HOSPADM

## 2020-05-20 RX ORDER — PHYTONADIONE 1 MG/.5ML
INJECTION, EMULSION INTRAMUSCULAR; INTRAVENOUS; SUBCUTANEOUS
Status: DISPENSED
Start: 2020-05-20 | End: 2020-05-20

## 2020-05-20 RX ORDER — HYDROCORTISONE 25 MG/G
1 CREAM TOPICAL AS NEEDED
Status: DISCONTINUED | OUTPATIENT
Start: 2020-05-20 | End: 2020-05-22 | Stop reason: HOSPADM

## 2020-05-20 RX ORDER — BISACODYL 10 MG
10 SUPPOSITORY, RECTAL RECTAL DAILY PRN
Status: DISCONTINUED | OUTPATIENT
Start: 2020-05-21 | End: 2020-05-22 | Stop reason: HOSPADM

## 2020-05-20 RX ORDER — ONDANSETRON 4 MG/1
4 TABLET, FILM COATED ORAL EVERY 8 HOURS PRN
Status: DISCONTINUED | OUTPATIENT
Start: 2020-05-20 | End: 2020-05-22 | Stop reason: HOSPADM

## 2020-05-20 RX ORDER — ACETAMINOPHEN 500 MG
1000 TABLET ORAL EVERY 6 HOURS
Status: DISCONTINUED | OUTPATIENT
Start: 2020-05-20 | End: 2020-05-22 | Stop reason: HOSPADM

## 2020-05-20 RX ADMIN — OXYTOCIN 2 MILLI-UNITS/MIN: 10 INJECTION, SOLUTION INTRAMUSCULAR; INTRAVENOUS at 01:51

## 2020-05-20 RX ADMIN — ACETAMINOPHEN 1000 MG: 500 TABLET, FILM COATED ORAL at 10:42

## 2020-05-20 RX ADMIN — IBUPROFEN 600 MG: 600 TABLET ORAL at 23:11

## 2020-05-20 RX ADMIN — ACETAMINOPHEN 1000 MG: 500 TABLET, FILM COATED ORAL at 21:09

## 2020-05-20 RX ADMIN — SUFENTANIL CITRATE 8 MCG: 50 INJECTION EPIDURAL; INTRAVENOUS at 06:07

## 2020-05-20 RX ADMIN — SODIUM CHLORIDE, POTASSIUM CHLORIDE, SODIUM LACTATE AND CALCIUM CHLORIDE 125 ML/HR: 600; 310; 30; 20 INJECTION, SOLUTION INTRAVENOUS at 05:47

## 2020-05-20 RX ADMIN — BUPIVACAINE HYDROCHLORIDE 8 ML: 2.5 INJECTION, SOLUTION EPIDURAL; INFILTRATION; INTRACAUDAL; PERINEURAL at 06:07

## 2020-05-20 RX ADMIN — ONDANSETRON 4 MG: 2 INJECTION INTRAMUSCULAR; INTRAVENOUS at 06:44

## 2020-05-20 RX ADMIN — EPHEDRINE SULFATE 5 MG: 50 INJECTION INTRAVENOUS at 09:01

## 2020-05-20 RX ADMIN — Medication: at 14:58

## 2020-05-20 RX ADMIN — OXYTOCIN 125 ML/HR: 10 INJECTION, SOLUTION INTRAMUSCULAR; INTRAVENOUS at 11:44

## 2020-05-20 RX ADMIN — SODIUM CHLORIDE, POTASSIUM CHLORIDE, SODIUM LACTATE AND CALCIUM CHLORIDE 999 ML/HR: 600; 310; 30; 20 INJECTION, SOLUTION INTRAVENOUS at 02:02

## 2020-05-20 RX ADMIN — ACETAMINOPHEN 1000 MG: 500 TABLET, FILM COATED ORAL at 15:03

## 2020-05-20 RX ADMIN — HYDROCORTISONE 1 APPLICATION: 25 CREAM TOPICAL at 14:58

## 2020-05-20 RX ADMIN — METHADONE HYDROCHLORIDE 75 MG: 5 TABLET ORAL at 07:53

## 2020-05-20 RX ADMIN — IBUPROFEN 800 MG: 800 TABLET, FILM COATED ORAL at 10:42

## 2020-05-20 RX ADMIN — OXYTOCIN 999 ML/HR: 10 INJECTION, SOLUTION INTRAMUSCULAR; INTRAVENOUS at 10:23

## 2020-05-20 RX ADMIN — ROPIVACAINE HYDROCHLORIDE 10 ML/HR: 2 INJECTION, SOLUTION EPIDURAL; INFILTRATION at 02:26

## 2020-05-20 RX ADMIN — METHADONE HYDROCHLORIDE 75 MG: 5 TABLET ORAL at 21:09

## 2020-05-20 RX ADMIN — IBUPROFEN 600 MG: 600 TABLET ORAL at 17:08

## 2020-05-20 NOTE — PLAN OF CARE
Problem: Patient Care Overview  Goal: Plan of Care Review  Outcome: Ongoing (interventions implemented as appropriate)  Flowsheets (Taken 2020 1236)  Progress: improving  Plan of Care Reviewed With: patient  Goal: Individualization and Mutuality  Outcome: Ongoing (interventions implemented as appropriate)  Flowsheets  Taken 2020 1700 by Estephania Bartlett RN  Patient Specific Goals (Include Timeframe): pain mnaagment, healthy mom and baby  Taken 2020 1236 by Betty Fox RN  How to Address Anxieties/Fears: educated during delivery  What Anxieties, Fears, Concerns, or Questions Do You Have About Your Care?: first baby,  Taken 2020 0722 by Bella Ayala RN  Patient Specific Interventions: Induction; Epidural  What Information Would Help Us Give You More Personalized Care?: 1st baby; History of drug abuse  How Would You and/or Your Support Person Like to Participate in Your Care?: Have FOB at bedside and involved in POC  Patient Specific Preferences: Breastfeeding, Epidural, No Narcotics  Goal: Interprofessional Rounds/Family Conf  Outcome: Ongoing (interventions implemented as appropriate)  Flowsheets (Taken 2020 1236)  Participants: family; patient; physician     Problem: Skin Injury Risk (Adult)  Goal: Identify Related Risk Factors and Signs and Symptoms  Outcome: Ongoing (interventions implemented as appropriate)  Flowsheets (Taken 2020 0722 by Bella Ayala RN)  Related Risk Factors (Skin Injury Risk): body weight extremes;moisture;mobility impaired;medication  Goal: Skin Health and Integrity  Outcome: Ongoing (interventions implemented as appropriate)  Flowsheets (Taken 2020 0722 by Bella Ayala RN)  Skin Health and Integrity: making progress toward outcome     Problem: Fall Risk,  (Adult,Obstetrics,Pediatric)  Goal: Identify Related Risk Factors and Signs and Symptoms  Outcome: Ongoing (interventions implemented as appropriate)  Flowsheets (Taken  2020 0722 by Bella Ayala, RN)  Related Risk Factors (Fall Risk, ): balance change;medication side effects;pregnancy weight gain  Signs and Symptoms (Fall Risk, ): presence of fall risk factors  Goal: Absence of Maternal Fall  Outcome: Ongoing (interventions implemented as appropriate)  Flowsheets (Taken 2020 0722 by Bella Ayala, RN)  Absence of Maternal Fall: making progress toward outcome  Goal: Absence of  Fall/Drop  Outcome: Ongoing (interventions implemented as appropriate)  Flowsheets (Taken 2020 1236)  Absence of  Fall/Drop: making progress toward outcome

## 2020-05-20 NOTE — LACTATION NOTE
Returned to room to assist mom with latching baby. Baby still sleepy, helped mom with manual breast pump. Reinforced importance of hand expression and feeding baby drops of colostrum frequently. Dad is very supportive and hands on.

## 2020-05-20 NOTE — L&D DELIVERY NOTE
[facility]  Vaginal Delivery Note  Estephania Pathak  1995    Delivery     Delivery: Vaginal, Spontaneous     YOB: 2022    Time of Birth:  Gestational Age 10:11 AM   40w0d     Anesthesia: Epidural     Delivering clinician: Megan Campos    Forceps?   No   Vacuum? No    Shoulder dystocia present: No          Delivery narrative:  The patient is admitted for induction of labor due to BPP2/8. She received cervidil and then Pitocin was started per protocol.  Amniotomy was performed and returned clear fluid. The patient entered an active phase of labor and progressed along a normal labor curve to complete dilatation at +1station.    The fetal tracing remained predominantly category I with brief and limited category II changes.    With approximately 45 minutes of pushing, patient delivered a live viable male infant occiput anterior with restitution to occiput right.  The anterior and posterior shoulder delivered without difficulty.  No nuchal cord was identified. The body was delivered atraumatically.  The infant's nose and oropharynx were suctioned and cleared of secretions.  Cord clamping was delayed a minimum of 30 seconds then was clamped and cut.  The infant was placed on the mom's chest for bonding and care.    The placenta was expressed and delivered intact.                Immunization History   Administered Date(s) Administered   • Flu Vaccine Quad PF >18YRS 09/30/2019   • Hep A / Hep B 03/07/2018, 04/16/2018   • Influenza Quad Vaccine (Inpatient) 09/30/2019   • Influenza, Unspecified 09/08/2019   • PPD Test 01/13/2020, 02/07/2020   • Td 10/28/2005   • Tdap 02/28/2020           Infant    Findings: male  infant     Infant observations: Weight: 3933 g (8 lb 10.7 oz)   Length: 20.5  in  Observations/Comments:  Scale 1      Apgars: 8   @ 1 minute /    9   @ 5 minutes   Infant Name: Hoang     Placenta, Cord, and Fluid    Placenta delivered  Spontaneous  at   5/20/2020 10:23 AM     Cord: 3 vessels   present.   Nuchal Cord?  no   Cord blood obtained: Yes    Cord gases obtained:  Yes    Cord gas results: Venous:    pH, Cord Venous   Date Value Ref Range Status   05/20/2020 7.357 7.260 - 7.400 pH Units Final        Repair    Episiotomy: None     No    Lacerations: Yes  Laceration Information  Laceration Repaired?   Perineal: 2nd       Periurethral:         Labial:         Sulcus:         Vaginal:         Cervical:           Suture used for repair: 3-0 Vicryl   Estimated Blood Loss: Est. Blood Loss (mL): 300 mL(Filed from Delivery Summary) (05/20/20 1011)           Complications  none    Disposition  Mother to Remain in LD  in stable condition currently.  Baby to remains with mom  in stable condition currently.      Megan Campos MD  05/20/20  12:36

## 2020-05-20 NOTE — PROGRESS NOTES
Continued Stay Note  [facility]     Patient Name: Estephania Pathak  MRN: [id number]  Today's Date: 5/20/2020    Admit Date: 5/19/2020    Discharge Plan     Row Name 05/20/20 1536       Plan    Plan Comments  CPS report made Web reporting number [id number] was postive for Methadone. Baby drug UA pending. Will needs to screen. JERMAINE Greene        Discharge Codes    No documentation.             EVELYN Meade

## 2020-05-20 NOTE — ANESTHESIA PROCEDURE NOTES
Labor Epidural      Patient location during procedure: OB  Indication:at surgeon's request  Performed By  Anesthesiologist: Teja Ríos MD  Preanesthetic Checklist  Completed: patient identified, site marked, surgical consent, pre-op evaluation, timeout performed, IV checked, risks and benefits discussed and monitors and equipment checked  Prep:  Pt Position:sitting  Sterile Tech:cap, gloves, mask and sterile barrier  Prep:chlorhexidine gluconate and isopropyl alcohol  Monitoring:blood pressure monitoring and EKG  Epidural Block Procedure:  Approach:midline  Guidance:landmark technique  Location:L2-L3  Needle Type:Tuohy  Needle Gauge:17  Loss of Resistance Medium: air  Cath Depth at skin:12 cm  Paresthesia: none  Aspiration:negative  Test Dose:negative  Number of Attempts: 1  Post Assessment:  Dressing:occlusive dressing applied, secured with tape and biopatch applied  Pt Tolerance:patient tolerated the procedure well with no apparent complications  Complications:no

## 2020-05-20 NOTE — LACTATION NOTE
"P1T, sleepy baby. Parents state the baby is \"in DT from methadone\". Baby appears sleepy and content, NAD. Helped mom to hand express colostrum into baby's mouth. Mom's colostrum expresses very easily on L breast. Tried for 15 minutes but baby was not ready to wake for latching. He did swallow several drops of expressed colostrum. Discussed with parents the importance of frequent uninterrupted skin to skin time and hand expression/breast massage while waiting for baby to be ready for latching.   "

## 2020-05-20 NOTE — PROGRESS NOTES
Called to see patient due to heart rate deceleration down to 100 from baseline of about 120.  The patient is comfortable with her epidural.    Bulging bag of water is ruptured which reveals clear fluid.  Cervix is 9 cm 90% and 0 station.  Fetal heart rate tracing is category 1 with large acceleration with fetal scalp stimulation.  Previously the fetal heart rate had decelerated to about 100 for about 4 minutes.    Assessment-good cervical change.  Fetal heart rate tracing is reassuring currently.  Reassess in about 30 minutes.

## 2020-05-20 NOTE — ANESTHESIA POSTPROCEDURE EVALUATION
"Patient: Estephania Pathak    Procedure Summary     Date:  05/19/20 Room / Location:      Anesthesia Start:  1910 Anesthesia Stop:  05/20/20 1011    Procedure:  LABOR ANALGESIA Diagnosis:      Scheduled Providers:   Provider:  Teja Ríos MD    Anesthesia Type:  Not recorded ASA Status:  2          Anesthesia Type: No value filed.    Vitals  Vitals Value Taken Time   /61 5/20/2020 12:32 PM   Temp 37.3 °C (99.2 °F) 5/20/2020 10:25 AM   Pulse 70 5/20/2020 12:32 PM   Resp 18 5/20/2020 12:30 PM   SpO2             Post Anesthesia Care and Evaluation    Patient location during evaluation: bedside  Patient participation: complete - patient participated  Level of consciousness: awake and alert  Pain management: adequate  Airway patency: patent  Anesthetic complications: No anesthetic complications    Cardiovascular status: acceptable  Respiratory status: acceptable  Hydration status: acceptable    Comments: /61   Pulse 70   Temp 37.3 °C (99.2 °F) (Oral)   Resp 18   Ht 165.1 cm (65\")   Wt 79.4 kg (175 lb)   LMP 06/08/2019 (Approximate)   SpO2 97%   Breastfeeding Yes   BMI 29.12 kg/m²       "

## 2020-05-20 NOTE — PLAN OF CARE
Problem: Patient Care Overview  Goal: Plan of Care Review  Outcome: Ongoing (interventions implemented as appropriate)  Flowsheets  Taken 5/19/2020 1700 by Estephania Bartlett RN  Progress: improving  Plan of Care Reviewed With: patient  Taken 5/20/2020 0722 by Bella Ayala RN  Outcome Summary: Patient currently 9-10, 100%, 0. Patient currently comfortable with epidural.     Problem: Patient Care Overview  Goal: Individualization and Mutuality  Outcome: Ongoing (interventions implemented as appropriate)  Flowsheets  Taken 5/19/2020 1700 by Estephania Bartlett RN  Patient Specific Goals (Include Timeframe): pain mnaagment, healthy mom and baby  Taken 5/20/2020 0722 by Bella Ayala RN  How to Address Anxieties/Fears: Disussion about what to expect during labor process; keep informed of POC  Patient Specific Interventions: Induction; Epidural  What Information Would Help Us Give You More Personalized Care?: 1st baby; History of drug abuse  How Would You and/or Your Support Person Like to Participate in Your Care?: Have FOB at bedside and involved in POC  What Anxieties, Fears, Concerns, or Questions Do You Have About Your Care?: 1st baby  Patient Specific Preferences: Breastfeeding, Epidural, No Narcotics     Problem: Patient Care Overview  Goal: Discharge Needs Assessment  Outcome: Ongoing (interventions implemented as appropriate)  Flowsheets  Taken 5/20/2020 0722 by Bella Ayala RN  Equipment Needed After Discharge: none  Anticipated Changes Related to Illness: none  Transportation Concerns: car, none  Concerns to be Addressed: no discharge needs identified  Patient/Family Anticipates Transition to: home with family  Taken 5/19/2020 1119 by Estephania Bartlett RN  Equipment Currently Used at Home: none  Taken 5/19/2020 1116 by Estephania Bartlett RN  Transportation Anticipated: car, drives self  Patient/Family Anticipated Services at Transition: none  Taken 5/19/2020 1700 by Estephania Bartlett  ILENE Casas  Readmission Within the Last 30 Days: no previous admission in last 30 days     Problem: Patient Care Overview  Goal: Interprofessional Rounds/Family Conf  Outcome: Ongoing (interventions implemented as appropriate)  Flowsheets (Taken 2020)  Participants: family; nursing; patient; physician     Problem: Labor (Cervical Ripen, Induct, Augment) (Adult,Obstetrics,Pediatric)  Goal: Signs and Symptoms of Listed Potential Problems Will be Absent, Minimized or Managed (Labor)  Outcome: Ongoing (interventions implemented as appropriate)  Flowsheets (Taken 2020 1700 by Estephania Bartlett RN)  Problems Assessed (Labor): all  Problems Present (Labor): none     Problem: Anesthesia/Analgesia, Neuraxial (Adult)  Goal: Signs and Symptoms of Listed Potential Problems Will be Absent, Minimized or Managed (Anesthesia/Analgesia, Neuraxial)  Outcome: Ongoing (interventions implemented as appropriate)  Flowsheets (Taken 2020 07)  Problems Assessed (Neuraxial Anesthesia/Analgesia): all  Problems Present (Neuraxial Anes/Analg): none     Problem: Skin Injury Risk (Adult)  Goal: Identify Related Risk Factors and Signs and Symptoms  Outcome: Ongoing (interventions implemented as appropriate)  Flowsheets (Taken 2020 07)  Related Risk Factors (Skin Injury Risk): body weight extremes; moisture; mobility impaired; medication     Problem: Skin Injury Risk (Adult)  Goal: Skin Health and Integrity  Outcome: Ongoing (interventions implemented as appropriate)  Flowsheets (Taken 2020 07)  Skin Health and Integrity: making progress toward outcome     Problem: Fall Risk,  (Adult,Obstetrics,Pediatric)  Goal: Identify Related Risk Factors and Signs and Symptoms  Outcome: Ongoing (interventions implemented as appropriate)  Flowsheets (Taken 2020)  Related Risk Factors (Fall Risk, ): balance change; medication side effects; pregnancy weight gain  Signs and Symptoms (Fall Risk,  ): presence of fall risk factors     Problem: Fall Risk,  (Adult,Obstetrics,Pediatric)  Goal: Absence of Maternal Fall  Outcome: Ongoing (interventions implemented as appropriate)  Flowsheets (Taken 2020 0722)  Absence of Maternal Fall: making progress toward outcome     Problem: Fall Risk,  (Adult,Obstetrics,Pediatric)  Goal: Absence of [place] Fall/Drop  Outcome: Ongoing (interventions implemented as appropriate)

## 2020-05-21 LAB
BASOPHILS # BLD AUTO: 0.03 10*3/MM3 (ref 0–0.2)
BASOPHILS NFR BLD AUTO: 0.2 % (ref 0–1.5)
DEPRECATED RDW RBC AUTO: 41.8 FL (ref 37–54)
EOSINOPHIL # BLD AUTO: 0.09 10*3/MM3 (ref 0–0.4)
EOSINOPHIL NFR BLD AUTO: 0.6 % (ref 0.3–6.2)
ERYTHROCYTE [DISTWIDTH] IN BLOOD BY AUTOMATED COUNT: 14 % (ref 12.3–15.4)
HCT VFR BLD AUTO: 26 % (ref 34–46.6)
HCV RNA SERPL NAA+PROBE-ACNC: NORMAL IU/ML
HGB BLD-MCNC: 8.9 G/DL (ref 12–15.9)
IMM GRANULOCYTES # BLD AUTO: 0.1 10*3/MM3 (ref 0–0.05)
IMM GRANULOCYTES NFR BLD AUTO: 0.6 % (ref 0–0.5)
LYMPHOCYTES # BLD AUTO: 2.68 10*3/MM3 (ref 0.7–3.1)
LYMPHOCYTES NFR BLD AUTO: 16.6 % (ref 19.6–45.3)
MCH RBC QN AUTO: 28.3 PG (ref 26.6–33)
MCHC RBC AUTO-ENTMCNC: 34.2 G/DL (ref 31.5–35.7)
MCV RBC AUTO: 82.8 FL (ref 79–97)
MONOCYTES # BLD AUTO: 1.19 10*3/MM3 (ref 0.1–0.9)
MONOCYTES NFR BLD AUTO: 7.4 % (ref 5–12)
NEUTROPHILS # BLD AUTO: 12.1 10*3/MM3 (ref 1.7–7)
NEUTROPHILS NFR BLD AUTO: 74.6 % (ref 42.7–76)
NRBC BLD AUTO-RTO: 0 /100 WBC (ref 0–0.2)
PLATELET # BLD AUTO: 246 10*3/MM3 (ref 140–450)
PMV BLD AUTO: 10.1 FL (ref 6–12)
RBC # BLD AUTO: 3.14 10*6/MM3 (ref 3.77–5.28)
TEST INFORMATION: NORMAL
WBC NRBC COR # BLD: 16.19 10*3/MM3 (ref 3.4–10.8)

## 2020-05-21 PROCEDURE — 85025 COMPLETE CBC W/AUTO DIFF WBC: CPT | Performed by: OBSTETRICS & GYNECOLOGY

## 2020-05-21 RX ORDER — METHADONE HYDROCHLORIDE 10 MG/1
150 TABLET ORAL ONCE
Status: COMPLETED | OUTPATIENT
Start: 2020-05-22 | End: 2020-05-22

## 2020-05-21 RX ORDER — NICOTINE 21 MG/24HR
1 PATCH, TRANSDERMAL 24 HOURS TRANSDERMAL
Status: DISCONTINUED | OUTPATIENT
Start: 2020-05-21 | End: 2020-05-22 | Stop reason: HOSPADM

## 2020-05-21 RX ORDER — CYCLOBENZAPRINE HCL 10 MG
10 TABLET ORAL 3 TIMES DAILY
Status: DISCONTINUED | OUTPATIENT
Start: 2020-05-21 | End: 2020-05-22 | Stop reason: HOSPADM

## 2020-05-21 RX ADMIN — IBUPROFEN 600 MG: 600 TABLET ORAL at 05:46

## 2020-05-21 RX ADMIN — ACETAMINOPHEN 1000 MG: 500 TABLET, FILM COATED ORAL at 11:27

## 2020-05-21 RX ADMIN — DOCUSATE SODIUM 100 MG: 100 CAPSULE, LIQUID FILLED ORAL at 22:19

## 2020-05-21 RX ADMIN — NICOTINE 1 PATCH: 14 PATCH TRANSDERMAL at 19:07

## 2020-05-21 RX ADMIN — IBUPROFEN 600 MG: 600 TABLET ORAL at 22:19

## 2020-05-21 RX ADMIN — IBUPROFEN 600 MG: 600 TABLET ORAL at 15:53

## 2020-05-21 RX ADMIN — METHADONE HYDROCHLORIDE 75 MG: 5 TABLET ORAL at 22:20

## 2020-05-21 RX ADMIN — Medication 1 TABLET: at 08:46

## 2020-05-21 RX ADMIN — CYCLOBENZAPRINE 10 MG: 10 TABLET, FILM COATED ORAL at 18:30

## 2020-05-21 RX ADMIN — ACETAMINOPHEN 1000 MG: 500 TABLET, FILM COATED ORAL at 18:30

## 2020-05-21 RX ADMIN — METHADONE HYDROCHLORIDE 75 MG: 5 TABLET ORAL at 08:47

## 2020-05-21 RX ADMIN — DOCUSATE SODIUM 100 MG: 100 CAPSULE, LIQUID FILLED ORAL at 08:46

## 2020-05-21 RX ADMIN — ACETAMINOPHEN 1000 MG: 500 TABLET, FILM COATED ORAL at 03:09

## 2020-05-21 NOTE — PROGRESS NOTES
Continued Stay Note  [facility]     Patient Name: Estephania Pathak  MRN: [id number]  Today's Date: 5/21/2020    Admit Date: 5/19/2020    Discharge Plan     Row Name 05/21/20 1328       Plan    Plan  CSW to follow for CPS recommendation and cord toxicology results. Maribel Holbrook    Plan Comments  Mother: Estephania Pathak, MRN: [id number]; Infant: Roselia Pathak, MRN: [id number]. CSW spoke with RN Luis who states mom has been appropriate with baby in the room. RN states father of the baby has been asleep and verbalized not hearing baby's cries when mother expressed she had not rested due to baby crying. Infant is being monitored using Brayan Scoring per RN. CSW spoke with mother at bedside while father of the baby, Kapil Santillan, slept on the couch and awoke for a short time during interview. Mother was holding baby during this time. Mother confirmed address, phone number, and insurance coverage. Mother states plans to discharge home to live with her father who will also be her support. Mother states she has a car seat, crib, and clothes for the infant. Mother states she plans for infant to be seen by a pediatrician at [facility]. Per mother [facility] has already contacted her to set up insurance for infant. Mother states already receiving WIC at this time and is aware of need to notify that baby has been born. Mother is also in HANDS program. Mother denies feeling unsafe or threatened at this time. CSW left a packet of resources including CSW contact information, depression in pregnancy pamphlet, counseling, [facility] WIC, HANDS, and other community resources. Resources were left in the room. Mother was encouraged to reach out if she had any concerns. CSW spoke with mother related to her positive urine drug screen for Methadone. Mother states she is in a program through [facility] in [place], [place] and that she is in good standing at the program.  CSW spoke to mother  about policy for reporting to CPS and that they were aware she states that she is in a Methadone program. CSW explained that someone from CPS should be calling her soon. Mother stated understanding and appeared calm. Mother denies feeling unsafe or threatened. According to MD Franco's notes patient reported that father of baby was using as recently as 5/5/2020. Also per MD Franco's notes patient reported that she had a urine drug screen positive for Cocaine at her methadone clinic on 4/21 but patient reports that she has not been using. Mother's UDS on admission was positive for Methadone no urine drug screen on infant, cord toxicology sent. CSW followed up on CPS report sent by [facility] WEB ID # [id number] additional information given related to COVID19 screening and Methadone clinic to Dorota Lion at intake….continued…        Discharge Codes    No documentation.             Maribel Holbrook

## 2020-05-21 NOTE — LACTATION NOTE
P1. Baby Latches deeply with strong suckle but pops off frequently . Has rapid respirations and is irritable. Mom can hand express colostrum into his mouth also.Enc S2S and frequent attempts at breast. Mom is nervous about the nipple shield but reassured her that he latches well without it and that he is still learning as is she. Enc her to call  for help with nex t feed.

## 2020-05-21 NOTE — PROGRESS NOTES
Continued Stay Note  [facility]     Patient Name: Estephania Pathak  MRN: [id number]  Today's Date: 5/21/2020    Admit Date: 5/19/2020    Discharge Plan     Row Name 05/21/20 1633       Plan    Plan  CSW to follow for CPS recommendation on discharge. Follow for Cord. Maribel Holbrook CSW.    Plan Comments   Mother: Estephania Pathak, MRN: [id number]; Infant: Roselia Pathak, MRN: [id number]. CSW spoke with Hannah at [facility] [phone]. Hannah stated [facility] has accepted the report #[id number] and will be contacting mom to investigate through care providers. Hannah stated that baby should not leave hospital with mom until a determination is made that baby is safe to discharge home or placed in an alternative setting. CSW to continue following for CPS recommendations and Cord toxicology. Maribel Holbrook CSW.         Discharge Codes    No documentation.             Maribel Holbrook

## 2020-05-21 NOTE — PROGRESS NOTES
Continued Stay Note  [facility]     Patient Name: Estephania Pathak  MRN: [id number]  Today's Date: 5/21/2020    Admit Date: 5/19/2020    Discharge Plan     Row Name 05/21/20 1329       Plan    Plan  CSW to follow for CPS recommendation and cord toxicology results. Maribel Holbrook    Plan Comments  …continued…CSW emailed asking for a determination and the case has not been fully processed at this time. CSW to follow for CPS report acceptance or denial and discharge recommendations. CSW to follow for cord toxicology results and report to CPS if warranted. Maribel Holbrook CSW.     Row Name 05/21/20 1328       Plan    Plan  CSW to follow for CPS recommendation and cord toxicology results. aMribel Stoneggins    Plan Comments  Mother: Estephania Pathak, MRN: [id number]; Infant: Roselia Pathak, MRN: [id number]. CSW spoke with RN Luis who states mom has been appropriate with baby in the room. RN states father of the baby has been asleep and verbalized not hearing baby's cries when mother expressed she had not rested due to baby crying. Infant is being monitored using Brayan Scoring per RN. CSW spoke with mother at bedside while father of the baby, Kapil Santillan, slept on the couch and awoke for a short time during interview. Mother was holding baby during this time. Mother confirmed address, phone number, and insurance coverage. Mother states plans to discharge home to live with her father who will also be her support. Mother states she has a car seat, crib, and clothes for the infant. Mother states she plans for infant to be seen by a pediatrician at [facility]. Per mother [facility] has already contacted her to set up insurance for infant. Mother states already receiving WIC at this time and is aware of the need to notify them that baby has been born. Mother denies feeling unsafe or threatened at this time. CSW left a packet of resources including CSW contact information, depression in pregnancy pamphlet, counseling,  [facility], [employer], and other community resources. Resources were left in the room. Mother was encouraged to reach out if she had any concerns. CSW spoke with mother related to her positive urine drug screen for Methadone. Mother states she is in a program through [facility] in [place], [place] and that she is in good standing at the program.  CSW spoke to mother about policy for reporting to CPS and that they were aware she states that she is in a Methadone program. CSW explained that someone from CPS should be calling her soon. Mother stated understanding and appeared calm. Mother denies feeling unsafe or threatened. According to MD Franco's notes patient reported that father of baby was using as recently as 5/5/2020. Also per MD Franco's notes patient reported that she had a urine drug screen positive for Cocaine at her methadone clinic on 4/21 but patient reports that she has not been using. Mother's UDS on admission was positive for Methadone no urine drug screen on infant, cord toxicology sent. CSW followed up on CPS report sent by RICHAR Hyman WEB ID # [id number] additional information given related to COVID19 screening and Methadone clinic to Dorota Lion at intake….continued…        Discharge Codes    No documentation.             Maribel Holrbook

## 2020-05-21 NOTE — PROGRESS NOTES
2020    Name:Estephania Pathak   MR#:[id number]    Vaginal Delivery Progress Note    HD#2    Subjective   Postpartum Day 1: 24 y.o. yo Female  delivered at 40w0d  delivered a male  infant.     The patient feels well.  Her pain is moderately well-controlled. She asks if there is anything else we can use but is aware we will not add narcotic.   She is ambulating well.  Patient describes her bleeding as moderate lochia.    Breastfeeding: some difficulty latching. She notes that she is aware of our recommendation to not breastfeed but she refuses to not.     Patient Active Problem List   Diagnosis   • Tobacco use   • Methadone use (CMS/HCC)   • Supervision of other normal pregnancy, antepartum   • External hemorrhoids   • High risk pregnancy due to maternal drug abuse in third trimester (CMS/HCC)   • Hepatitis C   • Drug use   • Current every day smoker   • Polysubstance abuse (CMS/HCC)   • Pregnancy   • Decreased fetal movements in third trimester       Objective   Vital Signs Range for the last 24 hours  Temp: Temp:  [98.1 °F (36.7 °C)-98.9 °F (37.2 °C)] 98.3 °F (36.8 °C) Temp src: Oral   BP: BP: (104-116)/(57-70) 109/63        Pulse: Heart Rate:  [70-84] 73  RR: Resp:  [16-20] 18  Weight: 79.4 kg (175 lb)  BMI:  Body mass index is 29.12 kg/m².      Lab Results   Component Value Date    WBC 16.19 (H) 2020    HGB 8.9 (L) 2020    HCT 26.0 (L) 2020    MCV 82.8 2020     2020       Physical Exam  General:  no acute distresss.  Abdomen: abdomen is soft without significant tenderness, masses, organomegaly or guarding. Fundus: Firm with scant lochia  Extremities: no cyanosis, and no edema, no CT    Perineum:  Intact    Assessment/Plan   1.  PPD# 1  2. Doing well overall. I will add flexeril for pain, continue ibuprofen and tylenol scheduled, no narcotic  Discussed with Dr. Linares who prescribes her methadone, plan to switch to once daily dosing 150 mg tomorrow late morning and  then discharge, she has a home dose to take Saturday and Sunday will dose in [place].  Social work and CPS involved, no determination as of yet  No circ due to scoring for opiate withdrawal, so far doing well      Olimpia rFanco MD  5/21/2020 11:42

## 2020-05-22 VITALS
SYSTOLIC BLOOD PRESSURE: 109 MMHG | HEIGHT: 65 IN | RESPIRATION RATE: 16 BRPM | TEMPERATURE: 98.6 F | BODY MASS INDEX: 29.16 KG/M2 | DIASTOLIC BLOOD PRESSURE: 70 MMHG | WEIGHT: 175 LBS | OXYGEN SATURATION: 97 % | HEART RATE: 60 BPM

## 2020-05-22 PROCEDURE — 99024 POSTOP FOLLOW-UP VISIT: CPT | Performed by: OBSTETRICS & GYNECOLOGY

## 2020-05-22 RX ORDER — IBUPROFEN 600 MG/1
600 TABLET ORAL EVERY 6 HOURS
Qty: 30 TABLET | Refills: 0 | Status: SHIPPED | OUTPATIENT
Start: 2020-05-22 | End: 2021-08-24

## 2020-05-22 RX ADMIN — DOCUSATE SODIUM 100 MG: 100 CAPSULE, LIQUID FILLED ORAL at 10:32

## 2020-05-22 RX ADMIN — IBUPROFEN 600 MG: 600 TABLET ORAL at 04:16

## 2020-05-22 RX ADMIN — METHADONE HYDROCHLORIDE 150 MG: 10 TABLET ORAL at 12:47

## 2020-05-22 RX ADMIN — CYCLOBENZAPRINE 10 MG: 10 TABLET, FILM COATED ORAL at 02:34

## 2020-05-22 RX ADMIN — ACETAMINOPHEN 500 MG: 500 TABLET, FILM COATED ORAL at 04:15

## 2020-05-22 RX ADMIN — IBUPROFEN 600 MG: 600 TABLET ORAL at 10:32

## 2020-05-22 RX ADMIN — ACETAMINOPHEN 1000 MG: 500 TABLET, FILM COATED ORAL at 10:32

## 2020-05-22 RX ADMIN — Medication 1 TABLET: at 10:31

## 2020-05-22 RX ADMIN — CYCLOBENZAPRINE 10 MG: 10 TABLET, FILM COATED ORAL at 10:31

## 2020-05-22 NOTE — PROGRESS NOTES
"Continued Stay Note  [facility]     Patient Name: Estephania Pathak  MRN: [id number]  Today's Date: 5/22/2020    Admit Date: 5/19/2020    Discharge Plan     Row Name 05/22/20 1142       Plan    Plan  Await follow up from CPS worker regarding infant disposition. CSW will also continue to follow for cord toxicology results. JERMAINE Baig    Plan Comments  Mother: Estephania Pathak, MRN: [id number]; Infant: Roselia Pathak, MRN: [id number]. CSW was re-consulted for \"Continue involvement for maternal and paternal Hx drug use; mom being treated through methadone clinic.\"  [facility] CPS worker Hannah Hirsch ([phone]) at hospital and met with mother and father in hospital room. CSW met with CPS worker Torrey with ILENE Rivers, CSW Maribel MARINA, and Dr. Burgos. CPS worker reports that she will have to complete assessment for family and discuss with supervisor. Per CPS, disposition of infant is pending at this time. MD reports infant will be in NICU until at least Tuesday 5/26/20. CSW will follow and coordinate with CPS worker for infant disposition. CSW will also continue to follow for cord toxicology and will report results to CPS worker. JERMAINE Baig        Discharge Codes    No documentation.             EVELYN Baig    "

## 2020-05-22 NOTE — LACTATION NOTE
This note was copied from a baby's chart.  RN reports Dr Sada FINNEY does not want baby BF or taking mom's breast milk  Lactation Consult Note    Evaluation Completed: 2020 09:33  Patient Name: Roselia Pathak  :  2022  MRN:  [id number]     REFERRAL  INFORMATION:                                         DELIVERY HISTORY:  This patient has no babies on file.  This patient has no babies on file.  Skin to skin initiation date/time: 2020 10:12 AM  Skin to skin end date/time:      This patient has no babies on file.    MATERNAL ASSESSMENT:                               INFANT ASSESSMENT:  This patient has no babies on file.  This patient has no babies on file.  This patient has no babies on file.  This patient has no babies on file.  This patient has no babies on file.  This patient has no babies on file.  This patient has no babies on file.  This patient has no babies on file.  This patient has no babies on file.  This patient has no babies on file.  This patient has no babies on file.  This patient has no babies on file.  This patient has no babies on file.  This patient has no babies on file.  This patient has no babies on file.  This patient has no babies on file.  This patient has no babies on file.  This patient has no babies on file.  This patient has no babies on file.  This patient has no babies on file.      This patient has no babies on file.  This patient has no babies on file.  This patient has no babies on file.  This patient has no babies on file.  This patient has no babies on file.  This patient has no babies on file.    This patient has no babies on file.  This patient has no babies on file.  This patient has no babies on file.        MATERNAL INFANT FEEDING:                                                                       EQUIPMENT TYPE:                                 BREAST PUMPING:          LACTATION REFERRALS:

## 2020-05-22 NOTE — DISCHARGE SUMMARY
[facility]  Delivery Discharge Summary    Primary OB Clinician:     EDC: Estimated Date of Delivery: 20    Gestational Age:40w0d    Antepartum complications:   Tobacco use  Methadone use  Hepatitis C  Polysubstance abuse  Decreased fetal movement    Hospital procedures: vaginal delivery    Date of Delivery: 2020   Time of Delivery: 10:11 AM     Delivered By:  Megan Campos     Delivery Type: Vaginal, Spontaneous      Tubal Ligation: n/a    Baby:male  infant;   Apgar:  8   @ 1 minute /   Apgar:  9   @ 5 minutes   Weight: 3933 g (8 lb 10.7 oz)    Length: 20.5     Anesthesia: Epidural      Intrapartum complications: None    Laceration: Yes  Laceration Information  Laceration Repaired?   Perineal: 2nd       Periurethral:         Labial:         Sulcus:         Vaginal:         Cervical:                   Placenta: Spontaneous     Feeding method: Breastfeeding Status: Yes    Rh Immune globulin given: not applicable, A+    Rubella vaccine given: not applicable, Immune    Patient had an uncomplicated postpartum course. She was given routine postpartum instructions. She was advised to return to office in 1 week for follow-up. Her infant will remain in NICU for opiate withdrawal scoring. Social work and CPS is involved with case.     Discharge Date: 2020; Discharge Time: 11:54    Early Discharge:  NO    Plan:      Follow-up appointment with Dr. Malagon in 1 week.

## 2020-05-22 NOTE — PROGRESS NOTES
"[facility]  Vaginal Delivery Progress Note    Subjective   Postpartum Day 2: Vaginal Delivery    The patient feels well.  Her pain is well controlled.   She is ambulating well.  Patient describes her bleeding as staining only.    Breastfeeding: infant latching.    ROS:  Pulm: neg for shortness of air  Neuro: neg for headache or vision changes  GI: neg for n/v  : neg for heavy bleeding  Musculoskeletal: neg for leg pain    Objective     Vital Signs Range for the last 24 hours  Temperature: Temp:  [97.6 °F (36.4 °C)-98.6 °F (37 °C)] 98.6 °F (37 °C)   Temp Source: Temp src: Oral   BP: BP: ()/(60-70) 109/70   Pulse: Heart Rate:  [60-65] 60   Respirations: Resp:  [14-18] 16   SPO2:     O2 Amount (l/min):     O2 Devices     Weight:       Admit Height:  Height: 165.1 cm (65\")      Physical Exam:  General:  no acute distress.  Pulmonary: normal respiratory effort  Abdomen: Fundus: appropriate, firm, non tender  Extremities: bilateral lower extremities with trace edema, no cords or tenderness, 2+ DTR's      Lab results reviewed:    Lab Results   Component Value Date    WBC 16.19 (H) 05/21/2020    HGB 8.9 (L) 05/21/2020    HCT 26.0 (L) 05/21/2020    MCV 82.8 05/21/2020     05/21/2020     Rubella:  -    Rubella Antibodies, IgG   Date Value Ref Range Status   10/18/2019 2.40 Immune >0.99 index Final     Comment:                                     Non-immune       <0.90                                  Equivocal  0.90 - 0.99                                  Immune           >0.99       Rh Status:    RH type   Date Value Ref Range Status   05/19/2020 Positive  Final     Rh Factor   Date Value Ref Range Status   10/18/2019 Positive  Final     Comment:     Please note: Prior records for this patient's ABO / Rh type are not  available for additional verification.       Immunizations:   Immunization History   Administered Date(s) Administered   • Flu Vaccine Quad PF >18YRS 09/30/2019   • Hep A / Hep B 03/07/2018, " 04/16/2018   • Influenza Quad Vaccine (Inpatient) 09/30/2019   • Influenza, Unspecified 09/08/2019   • PPD Test 01/13/2020, 02/07/2020   • Td 10/28/2005   • Tdap 02/28/2020       Assessment/Plan       Decreased fetal movements in third trimester    Tobacco use    Methadone use (CMS/[facility])    High risk pregnancy due to maternal drug abuse in third trimester (CMS/[facility])    Hepatitis C    Drug use    Current every day smoker    Polysubstance abuse (CMS/[facility])    Pregnancy    Vaginal delivery      Estephania Pathak is Day 2  post-partum  Vaginal, Spontaneous  : pt is doing well and desires discharge home today. Instructions reviewed. Recommend f/u with primary MD in 1 week or prn.     CPS and social work involved with case. Infant will remain in NICU for opiate withdrawal scoring.      Plan: Discharge home, f/u with primary OB in 1 week.      Meron Cohen MD  5/22/2020  11:29

## 2020-05-22 NOTE — PLAN OF CARE
Problem: Patient Care Overview  Goal: Plan of Care Review  Outcome: Outcome(s) achieved  Flowsheets  Taken 5/20/2020 1236 by Betty Fox RN  Progress: improving  Taken 5/22/2020 0730 by Tita Miller, RN  Plan of Care Reviewed With: patient  Taken 5/22/2020 0918 by Tita Miller, RN  Outcome Summary: pain well controlled, ambulating well, pumping and visits NICU

## 2020-05-22 NOTE — LACTATION NOTE
Mom wanting assistance with latching baby. Baby has uncoordinated suck and cries at breast. Mom is able to express colostrum and she has pumped colostrum to bottle feed baby. Suck training performed. Baby will not latch at this time. Mom giving pumped colostrum. Encouraged skin to skin and hand expressing colostrum. Encouraged mom to call if needing further assistance. Gave mom sterilization bag with instructions to sterilize pump pieces once a day. Educated on positioning, importance of deep latching and ways to achieve it.    Lactation Consult Note    Evaluation Completed: 2020 08:53  Patient Name: Estephania Pathak  :  1995  MRN:  [id number]     REFERRAL  INFORMATION:                          Date of Referral: 20   Person Making Referral: nurse, patient  Maternal Reason for Referral: breastfeeding currently  Infant Reason for Referral: other (see comments)(TONJA)    DELIVERY HISTORY:          Skin to skin initiation date/time: 2020  10:12 AM   Skin to skin end date/time:              MATERNAL ASSESSMENT:                               INFANT ASSESSMENT:  Information for the patient's :  Roselia Pathak [[id number]]   No past medical history on file.                                                                                                                                MATERNAL INFANT FEEDING:                                                                       EQUIPMENT TYPE:                                 BREAST PUMPING:          LACTATION REFERRALS:

## 2020-05-23 LAB — METHADONE, URINE: POSITIVE

## 2020-05-26 ENCOUNTER — TELEPHONE (OUTPATIENT)
Dept: OBSTETRICS AND GYNECOLOGY | Age: 25
End: 2020-05-26

## 2020-05-26 NOTE — TELEPHONE ENCOUNTER
(Salv[place] pt) Pt's nipples are bleeding and cracked. Pt spoke with a lactation consultant and she suggest all purpose nipple ointment but patient denies a fever with it. Pt is also having bleeding from her hemorrhoids so she doesn't know if she needs an appt.     [phone]

## 2020-05-26 NOTE — TELEPHONE ENCOUNTER
I don't think she needs an appointment necessarily.  I sent some of the all purpose nipple ointment to the pharmacy  I don't feel as though she needs to come in for bleeding hemorrhoids but can use the tucks pads and preparation H cooling, also make sure that her stool is soft and using colace and/or miralax    Olimpia Franco MD  5/26/2020  13:11

## 2020-05-26 NOTE — PROGRESS NOTES
Continued Stay Note  [facility]     Patient Name: Estephania Pathak  MRN: [id number]  Today's Date: 5/26/2020    Admit Date: 5/19/2020    Discharge Plan     Row Name 05/26/20 1438       Plan    Plan  Per CPS worker, infant to discharge home with mother and maternal grandfather. JERMAINE Baig    Plan Comments  Mother: Estephania Pathak, MRN: [id number]; Infant: Roselia Pathak, MRN: [id number]. CSW spoke with CPS holger Uriarte, who advised that infant can discharge home with mother and maternal grandfather Noe Pathak. Maternal grandfather to provide transportation to mother and infant at discharge and to supervise mother in care of infant. CSW spoke with Dr. Bagley and advised of disposition for infant at discharge. MD advised infant will likely be ready for discharge on 5/27. CSW spoke with mother and father at bedside in NICU and discussed disposition of infant, family cooperative and appropriate. Family with medical questions regarding infant, CSW notified infant RN Nivia of family's request to speak with her for medical questions. Letter from CPS holger Uriarte received via email, letter indicated that the infant is to discharge home with mother and maternal grandfather Noe Pathak, letter placed on infant chart and in HIM basket for scanning. CPS holger Uriarte requests follow up if infant is not discharged on 5/27/20. CSW will continue to follow to assist with discharge as needed. JERMAINE Baig        Discharge Codes    No documentation.       Expected Discharge Date and Time     Expected Discharge Date Expected Discharge Time    May 22, 2020             EVELYN Baig

## 2020-05-27 NOTE — PROGRESS NOTES
Continued Stay Note  [facility]     Patient Name: Estephaina Pathak  MRN: [id number]  Today's Date: 5/27/2020    Admit Date: 5/19/2020    Discharge Plan     Row Name 05/27/20 1304       Plan    Plan  Per CPS worker, infant to discharge home with mother and maternal grandfather. JERMAINE Baig    Plan Comments  Mother: Estephania Pathak, MRN: [id number]; Infant: Roselia Pathak, MRN: [id number]. CSW noted that infant is discharging today. CSW notified CPS worker Hannah Chapito of infant discharge today. Per CPS worker Chapito, she has spoken with family and has arranged a meeting with family at home after discharge. CSW spoke with ILENE Oquendo and advised that infant is to discharge home with mother and maternal grandfather, so normal discharge forms are to be used since infant is discharging with mother. CSW advised that a copy of maternal grandfather's photo ID will need to be obtained and placed into infant chart and record. No other concerns noted. JERMAINE Baig        Discharge Codes    No documentation.       Expected Discharge Date and Time     Expected Discharge Date Expected Discharge Time    May 22, 2020             EVELYN Baig

## 2020-06-16 ENCOUNTER — POSTPARTUM VISIT (OUTPATIENT)
Dept: OBSTETRICS AND GYNECOLOGY | Age: 25
End: 2020-06-16

## 2020-06-16 VITALS
SYSTOLIC BLOOD PRESSURE: 110 MMHG | HEIGHT: 65 IN | DIASTOLIC BLOOD PRESSURE: 78 MMHG | WEIGHT: 154 LBS | BODY MASS INDEX: 25.66 KG/M2

## 2020-06-16 PROCEDURE — 0503F POSTPARTUM CARE VISIT: CPT | Performed by: OBSTETRICS & GYNECOLOGY

## 2020-06-16 NOTE — PROGRESS NOTES
Postpartum Visit    2020    Patient: Estephania Pathak          MR#:[id number]    History of Present Illness    24 y.o. female  status post vaginal delivery.    Patient presents for postpartum visit without complaints. Breast and bottle feeding. Doing well in her recovery, denies relapse. Has custody of malia and he is doing great.  ________________________________________  Patient Active Problem List   Diagnosis   • Tobacco use   • Methadone use (CMS/HCC)   • Supervision of other normal pregnancy, antepartum   • External hemorrhoids   • High risk pregnancy due to maternal drug abuse in third trimester (CMS/[facility])   • Hepatitis C   • Drug use   • Current every day smoker   • Polysubstance abuse (CMS/HCC)   • Pregnancy   • Decreased fetal movements in third trimester   • Vaginal delivery   • Postpartum follow-up       Past Medical History:   Diagnosis Date   • Hepatitis-C     states not active, never had treatment.     • History of ADHD     AGE 6 TO 12,  TOOK PRESCRIPTION.   • History of vertebral fracture     L5. no surgery, brace and PT   • Hx of drug abuse (CMS/[facility])    • Kidney stone 2018   • Opiate addiction (CMS/[facility])        Past Surgical History:   Procedure Laterality Date   • D&C WITH SUCTION N/A 3/15/2019    Procedure: DILATATION AND CURETTAGE WITH SUCTION;  Surgeon: Megan Campos MD;  Location: [facility];  Service: Obstetrics/Gynecology   • KIDNEY STONE SURGERY     • WISDOM TOOTH EXTRACTION         Social History     Tobacco Use   Smoking Status Current Every Day Smoker   • Packs/day: 0.25   • Years: 10.00   • Pack years: 2.50   • Types: Cigarettes   Smokeless Tobacco Never Used   Tobacco Comment    not  interested currently-trying to quit on own       has a current medication list which includes the following prescription(s): docusate sodium, ibuprofen, magnesium gluconate, methadone, all purpose nipple ointment, prenate pixie, and prenatal vitamin  "27-0.8.  ________________________________________         Review of Systems- no bleeding, cramping, pain, notes perineal pain still, not SA    Objective     /78   Ht 165.1 cm (65\")   Wt 69.9 kg (154 lb)   LMP 06/08/2019 (Approximate)   Breastfeeding Yes Comment: breastmilk and formula   BMI 25.63 kg/m²    BP Readings from Last 3 Encounters:   06/16/20 110/78   05/22/20 109/70   05/19/20 108/68      Wt Readings from Last 3 Encounters:   06/16/20 69.9 kg (154 lb)   05/19/20 79.4 kg (175 lb)   05/19/20 79.4 kg (175 lb)      BMI: Estimated body mass index is 25.63 kg/m² as calculated from the following:    Height as of this encounter: 165.1 cm (65\").    Weight as of this encounter: 69.9 kg (154 lb).    EXAM     General:     Patient appears well in NAD  Respiratory: Normal effort, no distress  Abdomen: Soft, NT, +BS, no acute findings  Pelvic:  Scant lochia, perineum without signs of infection. Some suture at the surface of the perineum was trimmed today  Ext:  No cyanosis, no edema    Assessment:    Postpartum visit      Plan:  Return for PP exam with jacque Franco MD  6/16/2020 12:09      "

## 2020-06-17 ENCOUNTER — TELEPHONE (OUTPATIENT)
Dept: OBSTETRICS AND GYNECOLOGY | Age: 25
End: 2020-06-17

## 2020-06-17 NOTE — TELEPHONE ENCOUNTER
Pt has been breast feeding for a month and had started to develop sharp pains in her right breast.  Wants to know what she can do for this.  Please advise.

## 2020-06-18 ENCOUNTER — TELEPHONE (OUTPATIENT)
Dept: OBSTETRICS AND GYNECOLOGY | Age: 25
End: 2020-06-18

## 2020-07-01 ENCOUNTER — TELEPHONE (OUTPATIENT)
Dept: OBSTETRICS AND GYNECOLOGY | Age: 25
End: 2020-07-01

## 2020-07-01 RX ORDER — FLUCONAZOLE 150 MG/1
150 TABLET ORAL ONCE
Qty: 2 TABLET | Refills: 0 | Status: SHIPPED | OUTPATIENT
Start: 2020-07-01 | End: 2020-07-01

## 2020-07-01 NOTE — TELEPHONE ENCOUNTER
Patient called and states that her son has thrush and she now has a yeast infection on her breasts.  She would like to know if you can call her in a prescription for yeast.  Please advise .

## 2020-07-01 NOTE — TELEPHONE ENCOUNTER
Yes cream sent to the pharmacy. Will try to avoid diflucan (the pill) because it can interact with the methadone  Please call    Olimpia Franco MD  7/1/2020  14:17

## 2020-07-14 ENCOUNTER — POSTPARTUM VISIT (OUTPATIENT)
Dept: OBSTETRICS AND GYNECOLOGY | Age: 25
End: 2020-07-14

## 2020-07-14 VITALS
BODY MASS INDEX: 25.99 KG/M2 | SYSTOLIC BLOOD PRESSURE: 114 MMHG | DIASTOLIC BLOOD PRESSURE: 68 MMHG | HEIGHT: 65 IN | WEIGHT: 156 LBS

## 2020-07-14 DIAGNOSIS — Z13.9 SPECIAL SCREENING: Primary | ICD-10-CM

## 2020-07-14 DIAGNOSIS — Z30.017 NEXPLANON INSERTION: ICD-10-CM

## 2020-07-14 LAB
B-HCG UR QL: NEGATIVE
INTERNAL NEGATIVE CONTROL: NEGATIVE
INTERNAL POSITIVE CONTROL: POSITIVE
Lab: NORMAL

## 2020-07-14 PROCEDURE — 11981 INSERTION DRUG DLVR IMPLANT: CPT | Performed by: OBSTETRICS & GYNECOLOGY

## 2020-07-14 PROCEDURE — 81025 URINE PREGNANCY TEST: CPT | Performed by: OBSTETRICS & GYNECOLOGY

## 2020-07-25 NOTE — PROGRESS NOTES
PROCEDURE NOTE   Nexplanon Insertion    Applying Universal Precautions I properly identified the patient and sought her signature with informed consent.This patient was counseled on the benefits and risks of insertion.    No LMP recorded.   UCG  negative  GENPROBE not done  Previous contraception used included  pregnancy    Site  left arm   The area was prepped with chloraprep,  3 cc's of  local 1% xylocaine with epinephrine was injected sub-cutaneous with a 25 ga needle.  After sufficient time for the anesthetic to work the device was inserted nexplanonsite: approximately 8 cm from the medial epicondyle and 3 cm posterior to the medial sulcus.  Closure and dressing was completed by bandaid and stretchwrap..  Instructions for wound care and follow up were discussed.    The patient tolerated the procedure well.    Olimpia Franco MD  7/25/2020  12:58

## 2020-08-27 ENCOUNTER — OFFICE VISIT (OUTPATIENT)
Dept: FAMILY MEDICINE CLINIC | Facility: CLINIC | Age: 25
End: 2020-08-27

## 2020-08-27 VITALS
OXYGEN SATURATION: 98 % | TEMPERATURE: 97.1 F | HEART RATE: 87 BPM | DIASTOLIC BLOOD PRESSURE: 68 MMHG | WEIGHT: 155 LBS | BODY MASS INDEX: 25.83 KG/M2 | HEIGHT: 65 IN | SYSTOLIC BLOOD PRESSURE: 112 MMHG

## 2020-08-27 DIAGNOSIS — H61.23 BILATERAL IMPACTED CERUMEN: ICD-10-CM

## 2020-08-27 DIAGNOSIS — F41.9 ANXIETY: ICD-10-CM

## 2020-08-27 DIAGNOSIS — F33.0 MAJOR DEPRESSIVE DISORDER, RECURRENT, MILD (HCC): Primary | ICD-10-CM

## 2020-08-27 PROCEDURE — 99214 OFFICE O/P EST MOD 30 MIN: CPT | Performed by: NURSE PRACTITIONER

## 2020-08-27 PROCEDURE — 69210 REMOVE IMPACTED EAR WAX UNI: CPT | Performed by: NURSE PRACTITIONER

## 2020-08-27 RX ORDER — VENLAFAXINE HYDROCHLORIDE 75 MG/1
75 CAPSULE, EXTENDED RELEASE ORAL DAILY
Qty: 30 CAPSULE | Refills: 2 | Status: SHIPPED | OUTPATIENT
Start: 2020-08-27 | End: 2020-09-24

## 2020-08-27 NOTE — PATIENT INSTRUCTIONS
Earwax Buildup, Adult  The ears produce a substance called earwax that helps keep bacteria out of the ear and protects the skin in the ear canal. Occasionally, earwax can build up in the ear and cause discomfort or hearing loss.  What increases the risk?  This condition is more likely to develop in people who:  · Are male.  · Are elderly.  · Naturally produce more earwax.  · Clean their ears often with cotton swabs.  · Use earplugs often.  · Use in-ear headphones often.  · Wear hearing aids.  · Have narrow ear canals.  · Have earwax that is overly thick or sticky.  · Have eczema.  · Are dehydrated.  · Have excess hair in the ear canal.  What are the signs or symptoms?  Symptoms of this condition include:  · Reduced or muffled hearing.  · A feeling of fullness in the ear or feeling that the ear is plugged.  · Fluid coming from the ear.  · Ear pain.  · Ear itch.  · Ringing in the ear.  · Coughing.  · An obvious piece of earwax that can be seen inside the ear canal.  How is this diagnosed?  This condition may be diagnosed based on:  · Your symptoms.  · Your medical history.  · An ear exam. During the exam, your health care provider will look into your ear with an instrument called an otoscope.  You may have tests, including a hearing test.  How is this treated?  This condition may be treated by:  · Using ear drops to soften the earwax.  · Having the earwax removed by a health care provider. The health care provider may:  ? Flush the ear with water.  ? Use an instrument that has a loop on the end (curette).  ? Use a suction device.  · Surgery to remove the wax buildup. This may be done in severe cases.  Follow these instructions at home:    · Take over-the-counter and prescription medicines only as told by your health care provider.  · Do not put any objects, including cotton swabs, into your ear. You can clean the opening of your ear canal with a washcloth or facial tissue.  · Follow instructions from your health care  provider about cleaning your ears. Do not over-clean your ears.  · Drink enough fluid to keep your urine clear or pale yellow. This will help to thin the earwax.  · Keep all follow-up visits as told by your health care provider. If earwax builds up in your ears often or if you use hearing aids, consider seeing your health care provider for routine, preventive ear cleanings. Ask your health care provider how often you should schedule your cleanings.  · If you have hearing aids, clean them according to instructions from the  and your health care provider.  Contact a health care provider if:  · You have ear pain.  · You develop a fever.  · You have blood, pus, or other fluid coming from your ear.  · You have hearing loss.  · You have ringing in your ears that does not go away.  · Your symptoms do not improve with treatment.  · You feel like the room is spinning (vertigo).  Summary  · Earwax can build up in the ear and cause discomfort or hearing loss.  · The most common symptoms of this condition include reduced or muffled hearing and a feeling of fullness in the ear or feeling that the ear is plugged.  · This condition may be diagnosed based on your symptoms, your medical history, and an ear exam.  · This condition may be treated by using ear drops to soften the earwax or by having the earwax removed by a health care provider.  · Do not put any objects, including cotton swabs, into your ear. You can clean the opening of your ear canal with a washcloth or facial tissue.  This information is not intended to replace advice given to you by your health care provider. Make sure you discuss any questions you have with your health care provider.  Document Released: 01/25/2006 Document Revised: 11/30/2018 Document Reviewed: 02/28/2018  [employer] Patient Education © 2020 [employer]  Major Depressive Disorder, Adult  Major depressive disorder (MDD) is a mental health condition. MDD often makes you feel sad,  hopeless, or helpless. MDD can also cause symptoms in your body. MDD can affect your:  · Work.  · School.  · Relationships.  · Other normal activities.  MDD can range from mild to very bad. It may occur once (single episode MDD). It can also occur many times (recurrent MDD).  The main symptoms of MDD often include:  · Feeling sad, depressed, or irritable most of the time.  · Loss of interest.  MDD symptoms also include:  · Sleeping too much or too little.  · Eating too much or too little.  · A change in your weight.  · Feeling tired (fatigue) or having low energy.  · Feeling worthless.  · Feeling guilty.  · Trouble making decisions.  · Trouble thinking clearly.  · Thoughts of suicide or harming others.  · Feeling weak.  · Feeling agitated.  · Keeping yourself from being around other people (isolation).  Follow these instructions at home:  Activity  · Do these things as told by your doctor:  ? Go back to your normal activities.  ? Exercise regularly.  ? Spend time outdoors.  Alcohol  · Talk with your doctor about how alcohol can affect your antidepressant medicines.  · Do not drink alcohol. Or, limit how much alcohol you drink.  ? This means no more than 1 drink a day for nonpregnant women and 2 drinks a day for men. One drink equals one of these:  § 12 oz of beer.  § 5 oz of wine.  § 1½ oz of hard liquor.  General instructions  · Take over-the-counter and prescription medicines only as told by your doctor.  · Eat a healthy diet.  · Get plenty of sleep.  · Find activities that you enjoy. Make time to do them.  · Think about joining a support group. Your doctor may be able to suggest a group for you.  · Keep all follow-up visits as told by your doctor. This is important.  Where to find more information:  · National [place] on Mental Illness:  ? www.tyler.org  · [place] National [place] of Mental Health:  ? www.nimh.nih.gov  · National Suicide Prevention Lifeline:  ? 1-649.764.2952. This is free, 24-hour help.  Contact  a doctor if:  · Your symptoms get worse.  · You have new symptoms.  Get help right away if:  · You self-harm.  · You see, hear, taste, smell, or feel things that are not present (hallucinate).  If you ever feel like you may hurt yourself or others, or have thoughts about taking your own life, get help right away. You can go to your nearest emergency department or call:  · Your local emergency services (911 in the [place]  · A suicide crisis helpline, such as the National Suicide Prevention Lifeline:  ? 4-488-338-7093. This is open 24 hours a day.  This information is not intended to replace advice given to you by your health care provider. Make sure you discuss any questions you have with your health care provider.  Document Released: 11/28/2016 Document Revised: 11/30/2018 Document Reviewed: 09/03/2017  [employer] Patient Education © 2020 [employer]  Managing Anxiety, Adult  After being diagnosed with an anxiety disorder, you may be relieved to know why you have felt or behaved a certain way. You may also feel overwhelmed about the treatment ahead and what it will mean for your life. With care and support, you can manage this condition and recover from it.  How to manage lifestyle changes  Managing stress and anxiety    Stress is your body's reaction to life changes and events, both good and bad. Most stress will last just a few hours, but stress can be ongoing and can lead to more than just stress. Although stress can play a major role in anxiety, it is not the same as anxiety. Stress is usually caused by something external, such as a deadline, test, or competition. Stress normally passes after the triggering event has ended.   Anxiety is caused by something internal, such as imagining a terrible outcome or worrying that something will go wrong that will devastate you. Anxiety often does not go away even after the triggering event is over, and it can become long-term (chronic) worry. It is important to understand  the differences between stress and anxiety and to manage your stress effectively so that it does not lead to an anxious response.  Talk with your health care provider or a counselor to learn more about reducing anxiety and stress. He or she may suggest tension reduction techniques, such as:  · Music therapy. This can include creating or listening to music that you enjoy and that inspires you.  · Mindfulness-based meditation. This involves being aware of your normal breaths while not trying to control your breathing. It can be done while sitting or walking.  · Centering prayer. This involves focusing on a word, phrase, or sacred image that means something to you and brings you peace.  · Deep breathing. To do this, expand your stomach and inhale slowly through your nose. Hold your breath for 3-5 seconds. Then exhale slowly, letting your stomach muscles relax.  · Self-talk. This involves identifying thought patterns that lead to anxiety reactions and changing those patterns.  · Muscle relaxation. This involves tensing muscles and then relaxing them.  Choose a tension reduction technique that suits your lifestyle and personality. These techniques take time and practice. Set aside 5-15 minutes a day to do them. Therapists can offer counseling and training in these techniques. The training to help with anxiety may be covered by some insurance plans. Other things you can do to manage stress and anxiety include:  · Keeping a stress/anxiety diary. This can help you learn what triggers your reaction and then learn ways to manage your response.  · Thinking about how you react to certain situations. You may not be able to control everything, but you can control your response.  · Making time for activities that help you relax and not feeling guilty about spending your time in this way.  · Visual imagery and yoga can help you stay calm and relax.    Medicines  Medicines can help ease symptoms. Medicines for anxiety  include:  · Anti-anxiety drugs.  · Antidepressants.  Medicines are often used as a primary treatment for anxiety disorder. Medicines will be prescribed by a health care provider. When used together, medicines, psychotherapy, and tension reduction techniques may be the most effective treatment.  Relationships  Relationships can play a big part in helping you recover. Try to spend more time connecting with trusted friends and family members. Consider going to couples counseling, taking family education classes, or going to family therapy. Therapy can help you and others better understand your condition.  How to recognize changes in your anxiety  Everyone responds differently to treatment for anxiety. Recovery from anxiety happens when symptoms decrease and stop interfering with your daily activities at home or work. This may mean that you will start to:  · Have better concentration and focus. Worry will interfere less in your daily thinking.  · Sleep better.  · Be less irritable.  · Have more energy.  · Have improved memory.  It is important to recognize when your condition is getting worse. Contact your health care provider if your symptoms interfere with home or work and you feel like your condition is not improving.  Follow these instructions at home:  Activity  · Exercise. Most adults should do the following:  ? Exercise for at least 150 minutes each week. The exercise should increase your heart rate and make you sweat (moderate-intensity exercise).  ? Strengthening exercises at least twice a week.  · Get the right amount and quality of sleep. Most adults need 7-9 hours of sleep each night.  Lifestyle    · Eat a healthy diet that includes plenty of vegetables, fruits, whole grains, low-fat dairy products, and lean protein. Do not eat a lot of foods that are high in solid fats, added sugars, or salt.  · Make choices that simplify your life.  · Do not use any products that contain nicotine or tobacco, such as  cigarettes, e-cigarettes, and chewing tobacco. If you need help quitting, ask your health care provider.  · Avoid caffeine, alcohol, and certain over-the-counter cold medicines. These may make you feel worse. Ask your pharmacist which medicines to avoid.  General instructions  · Take over-the-counter and prescription medicines only as told by your health care provider.  · Keep all follow-up visits as told by your health care provider. This is important.  Where to find support  You can get help and support from these sources:  · Self-help groups.  · Online and community organizations.  · A trusted spiritual leader.  · Couples counseling.  · Family education classes.  · Family therapy.  Where to find more information  You may find that joining a support group helps you deal with your anxiety. The following sources can help you locate counselors or support groups near you:  · Mental Health [place]: www.mentalhealthamerica.net  · Anxiety and Depression Association of [place] (ADAA): www.adaa.org  · National [place] on Mental Illness (LEE): www.lee.org  Contact a health care provider if you:  · Have a hard time staying focused or finishing daily tasks.  · Spend many hours a day feeling worried about everyday life.  · Become exhausted by worry.  · Start to have headaches, feel tense, or have nausea.  · Urinate more than normal.  · Have diarrhea.  Get help right away if you have:  · A racing heart and shortness of breath.  · Thoughts of hurting yourself or others.  If you ever feel like you may hurt yourself or others, or have thoughts about taking your own life, get help right away. You can go to your nearest emergency department or call:  · Your local emergency services (911 in the [place]  · A suicide crisis helpline, such as the National Suicide Prevention Lifeline at 1-642.930.8282. This is open 24 hours a day.  Summary  · Taking steps to learn and use tension reduction techniques can help calm you and help prevent  triggering an anxiety reaction.  · When used together, medicines, psychotherapy, and tension reduction techniques may be the most effective treatment.  · Family, friends, and partners can play a big part in helping you recover from an anxiety disorder.  This information is not intended to replace advice given to you by your health care provider. Make sure you discuss any questions you have with your health care provider.  Document Released: 12/12/2017 Document Revised: 05/19/2020 Document Reviewed: 05/19/2020  [employer] Patient Education © 2020 [employer]

## 2020-08-27 NOTE — PROGRESS NOTES
Subjective   Estephania Pathak is a 25 y.o. female.     Chief Complaint   Patient presents with   • Depression     DISCUSS POSTPARTUM DEPRESSION. PATIENT IS 3 MONTHS POSTPARTUM. SHE IS BREAST FEEDING        History of Present Illness     Patient is here today to discuss possible postpartum depression.  She is 3 months postpartum.Hasn't seen ob/gyn or anybody for this.     She states when it comes to her son she has motivation, but has no motivation to do or take care of herself.     She has graduated [employer]  Has counselor and therapist and is in after care.    She is also still working.     She denies any SI or HI.      Has a sponsor.     She is trying to stay away from past friends that are users. Hasn't been tempted to use in last 3 months.   Just hard because she doesn't feel like she has friends.   Her baby's dad is in active addiction and has nothing to do with baby.         Phq 9 score of 13  Gualberto 7 score 8   Mood questionnaire is negative.          She also reports R ear outer pain and pain in both ears.  We cleaned her ears last time.       She also reports if she goes 1-2 days without showering she will notice a odor.  She denies any increase discharge.  Denies any itching or pain.  Hasn't had sex since postpartum.      The following portions of the patient's history were reviewed and updated as appropriate: allergies, current medications, past family history, past medical history, past social history, past surgical history and problem list.    Past Medical History:   Diagnosis Date   • Hepatitis-C     states not active, never had treatment.     • History of ADHD     AGE 6 TO 12,  TOOK PRESCRIPTION.   • History of vertebral fracture     L5. no surgery, brace and PT   • Hx of drug abuse (CMS/Formerly [facility])    • Kidney stone 12/25/2018   • Opiate addiction (CMS/Formerly [facility])        Past Surgical History:   Procedure Laterality Date   • D&C WITH SUCTION N/A 3/15/2019    Procedure: DILATATION AND CURETTAGE WITH SUCTION;  Surgeon:  Megan Campos MD;  Location: [facility] OR;  Service: Obstetrics/Gynecology   • KIDNEY STONE SURGERY     • WISDOM TOOTH EXTRACTION         Family History   Problem Relation Age of Onset   • Endometriosis Mother    • Malig Hyperthermia Neg Hx    • Breast cancer Neg Hx    • Uterine cancer Neg Hx    • Ovarian cancer Neg Hx    • Colon cancer Neg Hx    • Congenital heart disease Neg Hx        Social History     Socioeconomic History   • Marital status: Single     Spouse name: Not on file   • Number of children: 0   • Years of education: Not on file   • Highest education level: Not on file   Occupational History   • Occupation: UNEMPLOYED   Tobacco Use   • Smoking status: Current Every Day Smoker     Packs/day: 0.25     Years: 10.00     Pack years: 2.50     Types: Cigarettes   • Smokeless tobacco: Never Used   • Tobacco comment: not  interested currently-trying to quit on own   Substance and Sexual Activity   • Alcohol use: No   • Drug use: Not Currently     Types: Marijuana, Hydrocodone, Cocaine, Fentanyl, Heroin     Comment: heroin daily, pain pills-occasionally, a month for marijuana   • Sexual activity: Yes     Partners: Male     Birth control/protection: None, Condom   Social History Narrative    GYN PATIENT SINCE 2009.         Current Outpatient Medications:   •  docusate sodium (COLACE) 50 MG capsule, Take  by mouth 2 (Two) Times a Day., Disp: , Rfl:   •  ibuprofen (ADVIL,MOTRIN) 600 MG tablet, Take 1 tablet by mouth Every 6 (Six) Hours., Disp: 30 tablet, Rfl: 0  •  Magnesium Gluconate (MAGNESIUM 27 PO), Take  by mouth., Disp: , Rfl:   •  METHADONE HCL PO, Take 150 mg by mouth Daily., Disp: , Rfl:   •  miconazole (Micatin) 2 % cream, Apply to breast and nipple after feeding. Wipe from breast before breastfeeding., Disp: 28 g, Rfl: 1  •  Mupirocin (ALL PURPOSE NIPPLE OINTMENT), Apply 1 g topically to the appropriate area as directed Every 2 (Two) Hours As Needed (cracked nipples)., Disp: 40 g, Rfl: 6  •   "Prenatal Vit-Fe Fumarate-FA (PRENATAL VITAMIN 27-0.8) 27-0.8 MG tablet tablet, Take 1 tablet by mouth Daily., Disp: 90 tablet, Rfl: 3  •  carbamide peroxide (Debrox) 6.5 % otic solution, Administer 5 drops into both ears As Needed for Ear Pain., Disp: 15 mL, Rfl: 5  •  Prenat-FeAsp-Meth-FA-DHA w/o A (PRENATE PIXIE) 10-0.6-0.4-200 MG capsule, Take 1 tablet by mouth Daily., Disp: 30 capsule, Rfl: 11  •  venlafaxine XR (Effexor XR) 75 MG 24 hr capsule, Take 1 capsule by mouth Daily., Disp: 30 capsule, Rfl: 2    Review of Systems   Constitutional: Negative for fatigue and fever.   HENT: Positive for ear pain. Negative for congestion and rhinorrhea.    Respiratory: Negative for cough, shortness of breath and wheezing.    Cardiovascular: Negative for chest pain.   Gastrointestinal: Negative for abdominal distention, abdominal pain, constipation, diarrhea, nausea and vomiting.   Genitourinary: Negative for dysuria, urgency and vaginal discharge.   Skin: Negative.    Neurological: Negative for dizziness and headache.   Psychiatric/Behavioral: Positive for depressed mood. Negative for suicidal ideas. The patient is nervous/anxious.        Objective   Vitals:    08/27/20 1056   BP: 112/68   Pulse: 87   Temp: 97.1 °F (36.2 °C)   SpO2: 98%   Weight: 70.3 kg (155 lb)   Height: 165.1 cm (65\")     Body mass index is 25.79 kg/m².  Physical Exam   Constitutional: She is oriented to person, place, and time. She appears well-developed and well-nourished.   HENT:   Head: Normocephalic and atraumatic.   Right Ear: cerumen impaction is present.  Left Ear: An impacted cerumen is present.  Nose: Nose normal.   Mouth/Throat: Uvula is midline, oropharynx is clear and moist and mucous membranes are normal. Tonsils are 0 on the right. Tonsils are 0 on the left.   Cardiovascular: Normal rate, regular rhythm, normal heart sounds and intact distal pulses.   Pulmonary/Chest: Effort normal and breath sounds normal.   Neurological: She is alert and " oriented to person, place, and time.   Psychiatric: Her speech is normal and behavior is normal. Thought content normal. Cognition and memory are normal. She exhibits a depressed mood.     Ear Cerumen Removal  Date/Time: 8/27/2020 11:30 AM  Performed by: Luis Iyer APRN  Authorized by: Luis Iyer APRN     Anesthesia:  Local Anesthetic: none  Location details: left ear and right ear  Patient tolerance: Patient tolerated the procedure well with no immediate complications  Comments: TM right post removal revealed clear  TM left post removal revealed clear   Procedure type: instrumentation and irrigation   Sedation:  Patient sedated: no            Assessment/Plan   Estephania was seen today for depression.    Diagnoses and all orders for this visit:    Major depressive disorder, recurrent, mild (CMS/HCC)    Anxiety    Bilateral impacted cerumen    Other orders  -     venlafaxine XR (Effexor XR) 75 MG 24 hr capsule; Take 1 capsule by mouth Daily.  -     carbamide peroxide (Debrox) 6.5 % otic solution; Administer 5 drops into both ears As Needed for Ear Pain.    anxiety/depression: we discussed medication management of anxiety and depression at length.  Patient would like to proceed.  Citalopram can cause enlongated QT with methadone she is currently using, so will trial effexor.     If worsening mood, notify provider.    If SI or HI ideations, go to ER.    Follow up in 4 weeks for medication management.  Continue therapy and group sessions.       Impacted cerumen: cleaned out. No issues.  Use of debrox recommended as needed for ears.   Recommended again not to use ear wax.    Follow up as needed.        Time spent with patient was 32 minutes       Patient Instructions   Earwax Buildup, Adult  The ears produce a substance called earwax that helps keep bacteria out of the ear and protects the skin in the ear canal. Occasionally, earwax can build up in the ear and cause discomfort or hearing loss.  What increases the  risk?  This condition is more likely to develop in people who:  · Are male.  · Are elderly.  · Naturally produce more earwax.  · Clean their ears often with cotton swabs.  · Use earplugs often.  · Use in-ear headphones often.  · Wear hearing aids.  · Have narrow ear canals.  · Have earwax that is overly thick or sticky.  · Have eczema.  · Are dehydrated.  · Have excess hair in the ear canal.  What are the signs or symptoms?  Symptoms of this condition include:  · Reduced or muffled hearing.  · A feeling of fullness in the ear or feeling that the ear is plugged.  · Fluid coming from the ear.  · Ear pain.  · Ear itch.  · Ringing in the ear.  · Coughing.  · An obvious piece of earwax that can be seen inside the ear canal.  How is this diagnosed?  This condition may be diagnosed based on:  · Your symptoms.  · Your medical history.  · An ear exam. During the exam, your health care provider will look into your ear with an instrument called an otoscope.  You may have tests, including a hearing test.  How is this treated?  This condition may be treated by:  · Using ear drops to soften the earwax.  · Having the earwax removed by a health care provider. The health care provider may:  ? Flush the ear with water.  ? Use an instrument that has a loop on the end (curette).  ? Use a suction device.  · Surgery to remove the wax buildup. This may be done in severe cases.  Follow these instructions at home:    · Take over-the-counter and prescription medicines only as told by your health care provider.  · Do not put any objects, including cotton swabs, into your ear. You can clean the opening of your ear canal with a washcloth or facial tissue.  · Follow instructions from your health care provider about cleaning your ears. Do not over-clean your ears.  · Drink enough fluid to keep your urine clear or pale yellow. This will help to thin the earwax.  · Keep all follow-up visits as told by your health care provider. If earwax builds up  in your ears often or if you use hearing aids, consider seeing your health care provider for routine, preventive ear cleanings. Ask your health care provider how often you should schedule your cleanings.  · If you have hearing aids, clean them according to instructions from the  and your health care provider.  Contact a health care provider if:  · You have ear pain.  · You develop a fever.  · You have blood, pus, or other fluid coming from your ear.  · You have hearing loss.  · You have ringing in your ears that does not go away.  · Your symptoms do not improve with treatment.  · You feel like the room is spinning (vertigo).  Summary  · Earwax can build up in the ear and cause discomfort or hearing loss.  · The most common symptoms of this condition include reduced or muffled hearing and a feeling of fullness in the ear or feeling that the ear is plugged.  · This condition may be diagnosed based on your symptoms, your medical history, and an ear exam.  · This condition may be treated by using ear drops to soften the earwax or by having the earwax removed by a health care provider.  · Do not put any objects, including cotton swabs, into your ear. You can clean the opening of your ear canal with a washcloth or facial tissue.  This information is not intended to replace advice given to you by your health care provider. Make sure you discuss any questions you have with your health care provider.  Document Released: 01/25/2006 Document Revised: 11/30/2018 Document Reviewed: 02/28/2018  [employer] Patient Education © 2020 [employer]  Major Depressive Disorder, Adult  Major depressive disorder (MDD) is a mental health condition. MDD often makes you feel sad, hopeless, or helpless. MDD can also cause symptoms in your body. MDD can affect your:  · Work.  · School.  · Relationships.  · Other normal activities.  MDD can range from mild to very bad. It may occur once (single episode MDD). It can also occur many  times (recurrent MDD).  The main symptoms of MDD often include:  · Feeling sad, depressed, or irritable most of the time.  · Loss of interest.  MDD symptoms also include:  · Sleeping too much or too little.  · Eating too much or too little.  · A change in your weight.  · Feeling tired (fatigue) or having low energy.  · Feeling worthless.  · Feeling guilty.  · Trouble making decisions.  · Trouble thinking clearly.  · Thoughts of suicide or harming others.  · Feeling weak.  · Feeling agitated.  · Keeping yourself from being around other people (isolation).  Follow these instructions at home:  Activity  · Do these things as told by your doctor:  ? Go back to your normal activities.  ? Exercise regularly.  ? Spend time outdoors.  Alcohol  · Talk with your doctor about how alcohol can affect your antidepressant medicines.  · Do not drink alcohol. Or, limit how much alcohol you drink.  ? This means no more than 1 drink a day for nonpregnant women and 2 drinks a day for men. One drink equals one of these:  § 12 oz of beer.  § 5 oz of wine.  § 1½ oz of hard liquor.  General instructions  · Take over-the-counter and prescription medicines only as told by your doctor.  · Eat a healthy diet.  · Get plenty of sleep.  · Find activities that you enjoy. Make time to do them.  · Think about joining a support group. Your doctor may be able to suggest a group for you.  · Keep all follow-up visits as told by your doctor. This is important.  Where to find more information:  · National [place] on Mental Illness:  ? www.tyler.org  · [place] National [place] of Mental Health:  ? www.nimh.nih.gov  · National Suicide Prevention Lifeline:  ? 1-470.340.4706. This is free, 24-hour help.  Contact a doctor if:  · Your symptoms get worse.  · You have new symptoms.  Get help right away if:  · You self-harm.  · You see, hear, taste, smell, or feel things that are not present (hallucinate).  If you ever feel like you may hurt yourself or others, or  have thoughts about taking your own life, get help right away. You can go to your nearest emergency department or call:  · Your local emergency services (911 in the [place]  · A suicide crisis helpline, such as the National Suicide Prevention Lifeline:  ? 8-850-948-1334. This is open 24 hours a day.  This information is not intended to replace advice given to you by your health care provider. Make sure you discuss any questions you have with your health care provider.  Document Released: 11/28/2016 Document Revised: 11/30/2018 Document Reviewed: 09/03/2017  [employer] Patient Education © 2020 [employer]  Managing Anxiety, Adult  After being diagnosed with an anxiety disorder, you may be relieved to know why you have felt or behaved a certain way. You may also feel overwhelmed about the treatment ahead and what it will mean for your life. With care and support, you can manage this condition and recover from it.  How to manage lifestyle changes  Managing stress and anxiety    Stress is your body's reaction to life changes and events, both good and bad. Most stress will last just a few hours, but stress can be ongoing and can lead to more than just stress. Although stress can play a major role in anxiety, it is not the same as anxiety. Stress is usually caused by something external, such as a deadline, test, or competition. Stress normally passes after the triggering event has ended.   Anxiety is caused by something internal, such as imagining a terrible outcome or worrying that something will go wrong that will devastate you. Anxiety often does not go away even after the triggering event is over, and it can become long-term (chronic) worry. It is important to understand the differences between stress and anxiety and to manage your stress effectively so that it does not lead to an anxious response.  Talk with your health care provider or a counselor to learn more about reducing anxiety and stress. He or she may suggest  tension reduction techniques, such as:  · Music therapy. This can include creating or listening to music that you enjoy and that inspires you.  · Mindfulness-based meditation. This involves being aware of your normal breaths while not trying to control your breathing. It can be done while sitting or walking.  · Centering prayer. This involves focusing on a word, phrase, or sacred image that means something to you and brings you peace.  · Deep breathing. To do this, expand your stomach and inhale slowly through your nose. Hold your breath for 3-5 seconds. Then exhale slowly, letting your stomach muscles relax.  · Self-talk. This involves identifying thought patterns that lead to anxiety reactions and changing those patterns.  · Muscle relaxation. This involves tensing muscles and then relaxing them.  Choose a tension reduction technique that suits your lifestyle and personality. These techniques take time and practice. Set aside 5-15 minutes a day to do them. Therapists can offer counseling and training in these techniques. The training to help with anxiety may be covered by some insurance plans. Other things you can do to manage stress and anxiety include:  · Keeping a stress/anxiety diary. This can help you learn what triggers your reaction and then learn ways to manage your response.  · Thinking about how you react to certain situations. You may not be able to control everything, but you can control your response.  · Making time for activities that help you relax and not feeling guilty about spending your time in this way.  · Visual imagery and yoga can help you stay calm and relax.    Medicines  Medicines can help ease symptoms. Medicines for anxiety include:  · Anti-anxiety drugs.  · Antidepressants.  Medicines are often used as a primary treatment for anxiety disorder. Medicines will be prescribed by a health care provider. When used together, medicines, psychotherapy, and tension reduction techniques may be  the most effective treatment.  Relationships  Relationships can play a big part in helping you recover. Try to spend more time connecting with trusted friends and family members. Consider going to couples counseling, taking family education classes, or going to family therapy. Therapy can help you and others better understand your condition.  How to recognize changes in your anxiety  Everyone responds differently to treatment for anxiety. Recovery from anxiety happens when symptoms decrease and stop interfering with your daily activities at home or work. This may mean that you will start to:  · Have better concentration and focus. Worry will interfere less in your daily thinking.  · Sleep better.  · Be less irritable.  · Have more energy.  · Have improved memory.  It is important to recognize when your condition is getting worse. Contact your health care provider if your symptoms interfere with home or work and you feel like your condition is not improving.  Follow these instructions at home:  Activity  · Exercise. Most adults should do the following:  ? Exercise for at least 150 minutes each week. The exercise should increase your heart rate and make you sweat (moderate-intensity exercise).  ? Strengthening exercises at least twice a week.  · Get the right amount and quality of sleep. Most adults need 7-9 hours of sleep each night.  Lifestyle    · Eat a healthy diet that includes plenty of vegetables, fruits, whole grains, low-fat dairy products, and lean protein. Do not eat a lot of foods that are high in solid fats, added sugars, or salt.  · Make choices that simplify your life.  · Do not use any products that contain nicotine or tobacco, such as cigarettes, e-cigarettes, and chewing tobacco. If you need help quitting, ask your health care provider.  · Avoid caffeine, alcohol, and certain over-the-counter cold medicines. These may make you feel worse. Ask your pharmacist which medicines to avoid.  General  instructions  · Take over-the-counter and prescription medicines only as told by your health care provider.  · Keep all follow-up visits as told by your health care provider. This is important.  Where to find support  You can get help and support from these sources:  · Self-help groups.  · Online and community organizations.  · A trusted spiritual leader.  · Couples counseling.  · Family education classes.  · Family therapy.  Where to find more information  You may find that joining a support group helps you deal with your anxiety. The following sources can help you locate counselors or support groups near you:  · Mental Health [place]: www.mentalhealthamerica.net  · Anxiety and Depression Association of [place] (ADAA): www.adaa.org  · National [place] on Mental Illness (LEE): www.lee.org  Contact a health care provider if you:  · Have a hard time staying focused or finishing daily tasks.  · Spend many hours a day feeling worried about everyday life.  · Become exhausted by worry.  · Start to have headaches, feel tense, or have nausea.  · Urinate more than normal.  · Have diarrhea.  Get help right away if you have:  · A racing heart and shortness of breath.  · Thoughts of hurting yourself or others.  If you ever feel like you may hurt yourself or others, or have thoughts about taking your own life, get help right away. You can go to your nearest emergency department or call:  · Your local emergency services (911 in the [place]  · A suicide crisis helpline, such as the National Suicide Prevention Lifeline at 1-502.436.6940. This is open 24 hours a day.  Summary  · Taking steps to learn and use tension reduction techniques can help calm you and help prevent triggering an anxiety reaction.  · When used together, medicines, psychotherapy, and tension reduction techniques may be the most effective treatment.  · Family, friends, and partners can play a big part in helping you recover from an anxiety disorder.  This  information is not intended to replace advice given to you by your health care provider. Make sure you discuss any questions you have with your health care provider.  Document Released: 12/12/2017 Document Revised: 05/19/2020 Document Reviewed: 05/19/2020  [employer] Patient Education © 2020 [employer] Inc.

## 2020-09-24 ENCOUNTER — OFFICE VISIT (OUTPATIENT)
Dept: FAMILY MEDICINE CLINIC | Facility: CLINIC | Age: 25
End: 2020-09-24

## 2020-09-24 VITALS
BODY MASS INDEX: 26.59 KG/M2 | OXYGEN SATURATION: 98 % | HEIGHT: 65 IN | WEIGHT: 159.6 LBS | SYSTOLIC BLOOD PRESSURE: 108 MMHG | HEART RATE: 91 BPM | TEMPERATURE: 97.1 F | DIASTOLIC BLOOD PRESSURE: 82 MMHG

## 2020-09-24 DIAGNOSIS — F41.9 ANXIETY: ICD-10-CM

## 2020-09-24 DIAGNOSIS — F33.0 MAJOR DEPRESSIVE DISORDER, RECURRENT, MILD (HCC): Primary | ICD-10-CM

## 2020-09-24 PROCEDURE — 99213 OFFICE O/P EST LOW 20 MIN: CPT | Performed by: NURSE PRACTITIONER

## 2020-09-24 RX ORDER — VENLAFAXINE HYDROCHLORIDE 150 MG/1
150 CAPSULE, EXTENDED RELEASE ORAL DAILY
Qty: 30 CAPSULE | Refills: 2 | Status: SHIPPED | OUTPATIENT
Start: 2020-09-24 | End: 2020-10-22

## 2020-09-24 NOTE — PATIENT INSTRUCTIONS

## 2020-09-24 NOTE — PROGRESS NOTES
Subjective   Estephania Pathak is a 25 y.o. female.     Chief Complaint   Patient presents with   • Anxiety     mood check since going on effexor   • Depression        History of Present Illness       Patient is here today for follow up on anxiety and depression.  She states she hasn't noticed a difference with medication, but she states her aunt has told her she can notice a difference in her mood.    She denies any side effects to the medication that she doesn't like though.       PHQ 9 score from13 to 7   ANTOINE 7 score from 8 to 4    The following portions of the patient's history were reviewed and updated as appropriate: allergies, current medications, past family history, past medical history, past social history, past surgical history and problem list.    Past Medical History:   Diagnosis Date   • Hepatitis-C     states not active, never had treatment.     • History of ADHD     AGE 6 TO 12,  TOOK PRESCRIPTION.   • History of vertebral fracture     L5. no surgery, brace and PT   • Hx of drug abuse (CMS/HCC)    • Kidney stone 12/25/2018   • Opiate addiction (CMS/HCC)        Past Surgical History:   Procedure Laterality Date   • D&C WITH SUCTION N/A 3/15/2019    Procedure: DILATATION AND CURETTAGE WITH SUCTION;  Surgeon: Megan Campos MD;  Location: [facility];  Service: Obstetrics/Gynecology   • KIDNEY STONE SURGERY     • WISDOM TOOTH EXTRACTION         Family History   Problem Relation Age of Onset   • Endometriosis Mother    • Malig Hyperthermia Neg Hx    • Breast cancer Neg Hx    • Uterine cancer Neg Hx    • Ovarian cancer Neg Hx    • Colon cancer Neg Hx    • Congenital heart disease Neg Hx        Social History     Socioeconomic History   • Marital status: Single     Spouse name: Not on file   • Number of children: 0   • Years of education: Not on file   • Highest education level: Not on file   Occupational History   • Occupation: UNEMPLOYED   Tobacco Use   • Smoking status: Current Every Day Smoker      Packs/day: 0.25     Years: 10.00     Pack years: 2.50     Types: Cigarettes   • Smokeless tobacco: Never Used   • Tobacco comment: not  interested currently-trying to quit on own   Substance and Sexual Activity   • Alcohol use: No   • Drug use: Not Currently     Types: Marijuana, Hydrocodone, Cocaine(coke), Fentanyl, Heroin     Comment: heroin daily, pain pills-occasionally, a month for marijuana   • Sexual activity: Yes     Partners: Male     Birth control/protection: None, Condom   Social History Narrative    GYN PATIENT SINCE 2009.         Current Outpatient Medications:   •  carbamide peroxide (Debrox) 6.5 % otic solution, Administer 5 drops into both ears As Needed for Ear Pain., Disp: 15 mL, Rfl: 5  •  docusate sodium (COLACE) 50 MG capsule, Take  by mouth 2 (Two) Times a Day., Disp: , Rfl:   •  ibuprofen (ADVIL,MOTRIN) 600 MG tablet, Take 1 tablet by mouth Every 6 (Six) Hours., Disp: 30 tablet, Rfl: 0  •  Magnesium Gluconate (MAGNESIUM 27 PO), Take  by mouth., Disp: , Rfl:   •  METHADONE HCL PO, Take 150 mg by mouth Daily., Disp: , Rfl:   •  Prenatal Vit-Fe Fumarate-FA (PRENATAL VITAMIN 27-0.8) 27-0.8 MG tablet tablet, Take 1 tablet by mouth Daily., Disp: 90 tablet, Rfl: 3  •  venlafaxine XR (EFFEXOR-XR) 150 MG 24 hr capsule, Take 1 capsule by mouth Daily., Disp: 30 capsule, Rfl: 2    Review of Systems   Constitutional: Negative for fatigue and fever.   Respiratory: Negative for cough, shortness of breath and wheezing.    Cardiovascular: Negative for chest pain.   Gastrointestinal: Negative for abdominal pain, constipation, diarrhea, nausea and vomiting.   Genitourinary: Negative for dysuria and urgency.   Skin: Negative.    Neurological: Negative for dizziness and headache.   Psychiatric/Behavioral: Positive for depressed mood. Negative for suicidal ideas. The patient is nervous/anxious.        Objective   Vitals:    09/24/20 1259   BP: 108/82   Pulse: 91   Temp: 97.1 °F (36.2 °C)   SpO2: 98%   Weight: 72.4  "kg (159 lb 9.6 oz)   Height: 165.1 cm (65\")     Body mass index is 26.56 kg/m².  Physical Exam  Constitutional:       Appearance: Normal appearance.   Neurological:      Mental Status: She is alert and oriented to person, place, and time.   Psychiatric:         Mood and Affect: Mood normal.         Behavior: Behavior normal.         Thought Content: Thought content normal.         Judgment: Judgment normal.           Assessment/Plan   Estephania was seen today for anxiety and depression.    Diagnoses and all orders for this visit:    Major depressive disorder, recurrent, mild (CMS/HCC)    Anxiety    Other orders  -     venlafaxine XR (EFFEXOR-XR) 150 MG 24 hr capsule; Take 1 capsule by mouth Daily.      We will increase venlafaxine dose.  If worsening mood or anxiety notify provider.  If any SI or HI go to ER.   Patient verbalizes understanding.  We will follow up in 1 month for medication management.              Patient Instructions   Managing Anxiety, Adult  After being diagnosed with an anxiety disorder, you may be relieved to know why you have felt or behaved a certain way. You may also feel overwhelmed about the treatment ahead and what it will mean for your life. With care and support, you can manage this condition and recover from it.  How to manage lifestyle changes  Managing stress and anxiety    Stress is your body's reaction to life changes and events, both good and bad. Most stress will last just a few hours, but stress can be ongoing and can lead to more than just stress. Although stress can play a major role in anxiety, it is not the same as anxiety. Stress is usually caused by something external, such as a deadline, test, or competition. Stress normally passes after the triggering event has ended.   Anxiety is caused by something internal, such as imagining a terrible outcome or worrying that something will go wrong that will devastate you. Anxiety often does not go away even after the triggering event is " over, and it can become long-term (chronic) worry. It is important to understand the differences between stress and anxiety and to manage your stress effectively so that it does not lead to an anxious response.  Talk with your health care provider or a counselor to learn more about reducing anxiety and stress. He or she may suggest tension reduction techniques, such as:  · Music therapy. This can include creating or listening to music that you enjoy and that inspires you.  · Mindfulness-based meditation. This involves being aware of your normal breaths while not trying to control your breathing. It can be done while sitting or walking.  · Centering prayer. This involves focusing on a word, phrase, or sacred image that means something to you and brings you peace.  · Deep breathing. To do this, expand your stomach and inhale slowly through your nose. Hold your breath for 3-5 seconds. Then exhale slowly, letting your stomach muscles relax.  · Self-talk. This involves identifying thought patterns that lead to anxiety reactions and changing those patterns.  · Muscle relaxation. This involves tensing muscles and then relaxing them.  Choose a tension reduction technique that suits your lifestyle and personality. These techniques take time and practice. Set aside 5-15 minutes a day to do them. Therapists can offer counseling and training in these techniques. The training to help with anxiety may be covered by some insurance plans. Other things you can do to manage stress and anxiety include:  · Keeping a stress/anxiety diary. This can help you learn what triggers your reaction and then learn ways to manage your response.  · Thinking about how you react to certain situations. You may not be able to control everything, but you can control your response.  · Making time for activities that help you relax and not feeling guilty about spending your time in this way.  · Visual imagery and yoga can help you stay calm and  relax.    Medicines  Medicines can help ease symptoms. Medicines for anxiety include:  · Anti-anxiety drugs.  · Antidepressants.  Medicines are often used as a primary treatment for anxiety disorder. Medicines will be prescribed by a health care provider. When used together, medicines, psychotherapy, and tension reduction techniques may be the most effective treatment.  Relationships  Relationships can play a big part in helping you recover. Try to spend more time connecting with trusted friends and family members. Consider going to couples counseling, taking family education classes, or going to family therapy. Therapy can help you and others better understand your condition.  How to recognize changes in your anxiety  Everyone responds differently to treatment for anxiety. Recovery from anxiety happens when symptoms decrease and stop interfering with your daily activities at home or work. This may mean that you will start to:  · Have better concentration and focus. Worry will interfere less in your daily thinking.  · Sleep better.  · Be less irritable.  · Have more energy.  · Have improved memory.  It is important to recognize when your condition is getting worse. Contact your health care provider if your symptoms interfere with home or work and you feel like your condition is not improving.  Follow these instructions at home:  Activity  · Exercise. Most adults should do the following:  ? Exercise for at least 150 minutes each week. The exercise should increase your heart rate and make you sweat (moderate-intensity exercise).  ? Strengthening exercises at least twice a week.  · Get the right amount and quality of sleep. Most adults need 7-9 hours of sleep each night.  Lifestyle    · Eat a healthy diet that includes plenty of vegetables, fruits, whole grains, low-fat dairy products, and lean protein. Do not eat a lot of foods that are high in solid fats, added sugars, or salt.  · Make choices that simplify your  life.  · Do not use any products that contain nicotine or tobacco, such as cigarettes, e-cigarettes, and chewing tobacco. If you need help quitting, ask your health care provider.  · Avoid caffeine, alcohol, and certain over-the-counter cold medicines. These may make you feel worse. Ask your pharmacist which medicines to avoid.  General instructions  · Take over-the-counter and prescription medicines only as told by your health care provider.  · Keep all follow-up visits as told by your health care provider. This is important.  Where to find support  You can get help and support from these sources:  · Self-help groups.  · Online and community organizations.  · A trusted spiritual leader.  · Couples counseling.  · Family education classes.  · Family therapy.  Where to find more information  You may find that joining a support group helps you deal with your anxiety. The following sources can help you locate counselors or support groups near you:  · Mental Health [place]: www.mentalhealthamerica.net  · Anxiety and Depression Association of [place] (ADAA): www.adaa.org  · National [place] on Mental Illness (LEE): www.lee.org  Contact a health care provider if you:  · Have a hard time staying focused or finishing daily tasks.  · Spend many hours a day feeling worried about everyday life.  · Become exhausted by worry.  · Start to have headaches, feel tense, or have nausea.  · Urinate more than normal.  · Have diarrhea.  Get help right away if you have:  · A racing heart and shortness of breath.  · Thoughts of hurting yourself or others.  If you ever feel like you may hurt yourself or others, or have thoughts about taking your own life, get help right away. You can go to your nearest emergency department or call:  · Your local emergency services (911 in the [place]  · A suicide crisis helpline, such as the National Suicide Prevention Lifeline at 1-827.528.4334. This is open 24 hours a day.  Summary  · Taking steps to  learn and use tension reduction techniques can help calm you and help prevent triggering an anxiety reaction.  · When used together, medicines, psychotherapy, and tension reduction techniques may be the most effective treatment.  · Family, friends, and partners can play a big part in helping you recover from an anxiety disorder.  This information is not intended to replace advice given to you by your health care provider. Make sure you discuss any questions you have with your health care provider.  Document Released: 12/12/2017 Document Revised: 05/19/2020 Document Reviewed: 05/19/2020  Elsevier Patient Education © 2020 Elsevier Inc.

## 2020-10-22 ENCOUNTER — OFFICE VISIT (OUTPATIENT)
Dept: FAMILY MEDICINE CLINIC | Facility: CLINIC | Age: 25
End: 2020-10-22

## 2020-10-22 VITALS
WEIGHT: 163.4 LBS | TEMPERATURE: 97.1 F | HEART RATE: 93 BPM | HEIGHT: 65 IN | DIASTOLIC BLOOD PRESSURE: 84 MMHG | SYSTOLIC BLOOD PRESSURE: 124 MMHG | BODY MASS INDEX: 27.22 KG/M2 | OXYGEN SATURATION: 99 %

## 2020-10-22 DIAGNOSIS — F41.9 ANXIETY: ICD-10-CM

## 2020-10-22 DIAGNOSIS — F33.0 MAJOR DEPRESSIVE DISORDER, RECURRENT, MILD (HCC): Primary | ICD-10-CM

## 2020-10-22 PROCEDURE — 99213 OFFICE O/P EST LOW 20 MIN: CPT | Performed by: NURSE PRACTITIONER

## 2020-10-22 RX ORDER — BACLOFEN 20 MG
1 TABLET ORAL DAILY
COMMUNITY
Start: 2020-10-05 | End: 2021-01-28

## 2020-10-22 RX ORDER — VENLAFAXINE HYDROCHLORIDE 75 MG/1
75 CAPSULE, EXTENDED RELEASE ORAL DAILY
Qty: 30 CAPSULE | Refills: 2 | Status: SHIPPED | OUTPATIENT
Start: 2020-10-22 | End: 2020-12-17

## 2020-10-22 NOTE — PATIENT INSTRUCTIONS
Managing Anxiety, Adult  After being diagnosed with an anxiety disorder, you may be relieved to know why you have felt or behaved a certain way. You may also feel overwhelmed about the treatment ahead and what it will mean for your life. With care and support, you can manage this condition and recover from it.  How to manage lifestyle changes  Managing stress and anxiety    Stress is your body's reaction to life changes and events, both good and bad. Most stress will last just a few hours, but stress can be ongoing and can lead to more than just stress. Although stress can play a major role in anxiety, it is not the same as anxiety. Stress is usually caused by something external, such as a deadline, test, or competition. Stress normally passes after the triggering event has ended.   Anxiety is caused by something internal, such as imagining a terrible outcome or worrying that something will go wrong that will devastate you. Anxiety often does not go away even after the triggering event is over, and it can become long-term (chronic) worry. It is important to understand the differences between stress and anxiety and to manage your stress effectively so that it does not lead to an anxious response.  Talk with your health care provider or a counselor to learn more about reducing anxiety and stress. He or she may suggest tension reduction techniques, such as:  · Music therapy. This can include creating or listening to music that you enjoy and that inspires you.  · Mindfulness-based meditation. This involves being aware of your normal breaths while not trying to control your breathing. It can be done while sitting or walking.  · Centering prayer. This involves focusing on a word, phrase, or sacred image that means something to you and brings you peace.  · Deep breathing. To do this, expand your stomach and inhale slowly through your nose. Hold your breath for 3-5 seconds. Then exhale slowly, letting your stomach muscles  relax.  · Self-talk. This involves identifying thought patterns that lead to anxiety reactions and changing those patterns.  · Muscle relaxation. This involves tensing muscles and then relaxing them.  Choose a tension reduction technique that suits your lifestyle and personality. These techniques take time and practice. Set aside 5-15 minutes a day to do them. Therapists can offer counseling and training in these techniques. The training to help with anxiety may be covered by some insurance plans. Other things you can do to manage stress and anxiety include:  · Keeping a stress/anxiety diary. This can help you learn what triggers your reaction and then learn ways to manage your response.  · Thinking about how you react to certain situations. You may not be able to control everything, but you can control your response.  · Making time for activities that help you relax and not feeling guilty about spending your time in this way.  · Visual imagery and yoga can help you stay calm and relax.    Medicines  Medicines can help ease symptoms. Medicines for anxiety include:  · Anti-anxiety drugs.  · Antidepressants.  Medicines are often used as a primary treatment for anxiety disorder. Medicines will be prescribed by a health care provider. When used together, medicines, psychotherapy, and tension reduction techniques may be the most effective treatment.  Relationships  Relationships can play a big part in helping you recover. Try to spend more time connecting with trusted friends and family members. Consider going to couples counseling, taking family education classes, or going to family therapy. Therapy can help you and others better understand your condition.  How to recognize changes in your anxiety  Everyone responds differently to treatment for anxiety. Recovery from anxiety happens when symptoms decrease and stop interfering with your daily activities at home or work. This may mean that you will start to:  · Have  better concentration and focus. Worry will interfere less in your daily thinking.  · Sleep better.  · Be less irritable.  · Have more energy.  · Have improved memory.  It is important to recognize when your condition is getting worse. Contact your health care provider if your symptoms interfere with home or work and you feel like your condition is not improving.  Follow these instructions at home:  Activity  · Exercise. Most adults should do the following:  ? Exercise for at least 150 minutes each week. The exercise should increase your heart rate and make you sweat (moderate-intensity exercise).  ? Strengthening exercises at least twice a week.  · Get the right amount and quality of sleep. Most adults need 7-9 hours of sleep each night.  Lifestyle    · Eat a healthy diet that includes plenty of vegetables, fruits, whole grains, low-fat dairy products, and lean protein. Do not eat a lot of foods that are high in solid fats, added sugars, or salt.  · Make choices that simplify your life.  · Do not use any products that contain nicotine or tobacco, such as cigarettes, e-cigarettes, and chewing tobacco. If you need help quitting, ask your health care provider.  · Avoid caffeine, alcohol, and certain over-the-counter cold medicines. These may make you feel worse. Ask your pharmacist which medicines to avoid.  General instructions  · Take over-the-counter and prescription medicines only as told by your health care provider.  · Keep all follow-up visits as told by your health care provider. This is important.  Where to find support  You can get help and support from these sources:  · Self-help groups.  · Online and community organizations.  · A trusted spiritual leader.  · Couples counseling.  · Family education classes.  · Family therapy.  Where to find more information  You may find that joining a support group helps you deal with your anxiety. The following sources can help you locate counselors or support groups near  you:  · Mental Health [place]: www.mentalhealthamerica.net  · Anxiety and Depression Association of [place] (ADAA): www.adaa.org  · National [place] on Mental Illness (LEE): www.lee.org  Contact a health care provider if you:  · Have a hard time staying focused or finishing daily tasks.  · Spend many hours a day feeling worried about everyday life.  · Become exhausted by worry.  · Start to have headaches, feel tense, or have nausea.  · Urinate more than normal.  · Have diarrhea.  Get help right away if you have:  · A racing heart and shortness of breath.  · Thoughts of hurting yourself or others.  If you ever feel like you may hurt yourself or others, or have thoughts about taking your own life, get help right away. You can go to your nearest emergency department or call:  · Your local emergency services (911 in the [place]  · A suicide crisis helpline, such as the National Suicide Prevention Lifeline at 1-555.324.1619. This is open 24 hours a day.  Summary  · Taking steps to learn and use tension reduction techniques can help calm you and help prevent triggering an anxiety reaction.  · When used together, medicines, psychotherapy, and tension reduction techniques may be the most effective treatment.  · Family, friends, and partners can play a big part in helping you recover from an anxiety disorder.  This information is not intended to replace advice given to you by your health care provider. Make sure you discuss any questions you have with your health care provider.  Document Released: 12/12/2017 Document Revised: 05/19/2020 Document Reviewed: 05/19/2020  Elsevier Patient Education © 2020 Elsevier Inc.      Major Depressive Disorder, Adult  Major depressive disorder (MDD) is a mental health condition. MDD often makes you feel sad, hopeless, or helpless. MDD can also cause symptoms in your body. MDD can affect your:  · Work.  · School.  · Relationships.  · Other normal activities.  MDD can range from mild to  very bad. It may occur once (single episode MDD). It can also occur many times (recurrent MDD).  The main symptoms of MDD often include:  · Feeling sad, depressed, or irritable most of the time.  · Loss of interest.  MDD symptoms also include:  · Sleeping too much or too little.  · Eating too much or too little.  · A change in your weight.  · Feeling tired (fatigue) or having low energy.  · Feeling worthless.  · Feeling guilty.  · Trouble making decisions.  · Trouble thinking clearly.  · Thoughts of suicide or harming others.  · Feeling weak.  · Feeling agitated.  · Keeping yourself from being around other people (isolation).  Follow these instructions at home:  Activity  · Do these things as told by your doctor:  ? Go back to your normal activities.  ? Exercise regularly.  ? Spend time outdoors.  Alcohol  · Talk with your doctor about how alcohol can affect your antidepressant medicines.  · Do not drink alcohol. Or, limit how much alcohol you drink.  ? This means no more than 1 drink a day for nonpregnant women and 2 drinks a day for men. One drink equals one of these:  § 12 oz of beer.  § 5 oz of wine.  § 1½ oz of hard liquor.  General instructions  · Take over-the-counter and prescription medicines only as told by your doctor.  · Eat a healthy diet.  · Get plenty of sleep.  · Find activities that you enjoy. Make time to do them.  · Think about joining a support group. Your doctor may be able to suggest a group for you.  · Keep all follow-up visits as told by your doctor. This is important.  Where to find more information:  · National [place] on Mental Illness:  ? www.tyler.org  · [place] National [place] of Mental Health:  ? www.nimh.nih.gov  · National Suicide Prevention Lifeline:  ? 1-285.493.5269. This is free, 24-hour help.  Contact a doctor if:  · Your symptoms get worse.  · You have new symptoms.  Get help right away if:  · You self-harm.  · You see, hear, taste, smell, or feel things that are not present  (hallucinate).  If you ever feel like you may hurt yourself or others, or have thoughts about taking your own life, get help right away. You can go to your nearest emergency department or call:  · Your local emergency services (911 in the [place]  · A suicide crisis helpline, such as the National Suicide Prevention Lifeline:  ? 7-287-077-3103. This is open 24 hours a day.  This information is not intended to replace advice given to you by your health care provider. Make sure you discuss any questions you have with your health care provider.  Document Released: 11/28/2016 Document Revised: 11/30/2018 Document Reviewed: 09/03/2017  Elsevier Patient Education © 2020 Elsevier Inc.

## 2020-10-22 NOTE — PROGRESS NOTES
Subjective   Estephania Pathak is a 25 y.o. female.     Chief Complaint   Patient presents with   • Anxiety     med check   • Depression        History of Present Illness       Patient is here toady for follow up on anxiety and depression.  She also has a new complaint today of left ear pain.  Had a pimple and rubbing on it of mask about a month ago, and is still bothering her and hasn't gone away for about a month    Anxiety/depression: still don't notice anything, except she is more emotional.  She did find out she can get custody of her son.      The following portions of the patient's history were reviewed and updated as appropriate: allergies, current medications, past family history, past medical history, past social history, past surgical history and problem list.    Past Medical History:   Diagnosis Date   • Hepatitis-C     states not active, never had treatment.     • History of ADHD     AGE 6 TO 12,  TOOK PRESCRIPTION.   • History of vertebral fracture     L5. no surgery, brace and PT   • Hx of drug abuse (CMS/HCC)    • Kidney stone 12/25/2018   • Opiate addiction (CMS/[facility])        Past Surgical History:   Procedure Laterality Date   • D&C WITH SUCTION N/A 3/15/2019    Procedure: DILATATION AND CURETTAGE WITH SUCTION;  Surgeon: Megan Campos MD;  Location: [facility];  Service: Obstetrics/Gynecology   • KIDNEY STONE SURGERY     • WISDOM TOOTH EXTRACTION         Family History   Problem Relation Age of Onset   • Endometriosis Mother    • Malig Hyperthermia Neg Hx    • Breast cancer Neg Hx    • Uterine cancer Neg Hx    • Ovarian cancer Neg Hx    • Colon cancer Neg Hx    • Congenital heart disease Neg Hx        Social History     Socioeconomic History   • Marital status: Single     Spouse name: Not on file   • Number of children: 0   • Years of education: Not on file   • Highest education level: Not on file   Occupational History   • Occupation: UNEMPLOYED   Tobacco Use   • Smoking status: Current Every  Day Smoker     Packs/day: 0.25     Years: 10.00     Pack years: 2.50     Types: Cigarettes   • Smokeless tobacco: Never Used   • Tobacco comment: not  interested currently-trying to quit on own   Substance and Sexual Activity   • Alcohol use: No   • Drug use: Not Currently     Types: Marijuana, Hydrocodone, Cocaine(coke), Fentanyl, Heroin     Comment: heroin daily, pain pills-occasionally, a month for marijuana   • Sexual activity: Yes     Partners: Male     Birth control/protection: None, Condom   Social History Narrative    GYN PATIENT SINCE 2009.         Current Outpatient Medications:   •  carbamide peroxide (Debrox) 6.5 % otic solution, Administer 5 drops into both ears As Needed for Ear Pain., Disp: 15 mL, Rfl: 5  •  docusate sodium (COLACE) 50 MG capsule, Take  by mouth 2 (Two) Times a Day., Disp: , Rfl:   •  ibuprofen (ADVIL,MOTRIN) 600 MG tablet, Take 1 tablet by mouth Every 6 (Six) Hours., Disp: 30 tablet, Rfl: 0  •  Magnesium Oxide 500 MG tablet, Take 1 tablet by mouth Daily., Disp: , Rfl:   •  METHADONE HCL PO, Take 150 mg by mouth Daily., Disp: , Rfl:   •  Prenatal Vit-Fe Fumarate-FA (PRENATAL VITAMIN 27-0.8) 27-0.8 MG tablet tablet, Take 1 tablet by mouth Daily., Disp: 90 tablet, Rfl: 3  •  venlafaxine XR (EFFEXOR-XR) 75 MG 24 hr capsule, Take 1 capsule by mouth Daily., Disp: 30 capsule, Rfl: 2  •  mineral oil-hydrophilic petrolatum (AQUAPHOR) ointment, Apply  topically to the appropriate area as directed As Needed for Dry Skin., Disp: 396 g, Rfl: 0    Review of Systems   Constitutional: Negative for fatigue and fever.   Respiratory: Negative for cough, shortness of breath and wheezing.    Cardiovascular: Negative for chest pain.   Gastrointestinal: Negative for abdominal pain, constipation, diarrhea, nausea and vomiting.   Genitourinary: Negative for dysuria and urgency.   Skin: Positive for dry skin.   Neurological: Negative for dizziness and headache.   Psychiatric/Behavioral: Positive for dysphoric  "mood. Negative for suicidal ideas and depressed mood. The patient is not nervous/anxious.        Objective   Vitals:    10/22/20 1325   BP: 124/84   Pulse: 93   Temp: 97.1 °F (36.2 °C)   SpO2: 99%   Weight: 74.1 kg (163 lb 6.4 oz)   Height: 165.1 cm (65\")     Body mass index is 27.19 kg/m².  Physical Exam  Constitutional:       Appearance: Normal appearance.   Cardiovascular:      Rate and Rhythm: Normal rate and regular rhythm.      Pulses: Normal pulses.   Pulmonary:      Effort: Pulmonary effort is normal.      Breath sounds: Normal breath sounds.   Skin:     General: Skin is warm and dry.          Neurological:      Mental Status: She is alert and oriented to person, place, and time.   Psychiatric:         Mood and Affect: Mood normal.         Behavior: Behavior normal.         Thought Content: Thought content normal.         Judgment: Judgment normal.           Assessment/Plan   Diagnoses and all orders for this visit:    1. Major depressive disorder, recurrent, mild (CMS/HCC) (Primary)    2. Anxiety    Other orders  -     mineral oil-hydrophilic petrolatum (AQUAPHOR) ointment; Apply  topically to the appropriate area as directed As Needed for Dry Skin.  Dispense: 396 g; Refill: 0  -     venlafaxine XR (EFFEXOR-XR) 75 MG 24 hr capsule; Take 1 capsule by mouth Daily.  Dispense: 30 capsule; Refill: 2      Use Aquaphor for dry skin as needed.  If worsening notify provider.    Patient is wanting to wean off Effexor and try without a medicine for anxiety and depression.  I cautioned her with the severity of her symptoms, however she states she does not want to be feeling like she is having to take any medications so she is going to wean this.  She states she will keep a journal nightly and she will monitor her journal and if her mood seems to worsen she will go back on medication or trial another medication.  She says to be worse week but she would \"prove Brugger.  If she has any suicidal or homicidal ideation she " is told to go to the ER.  Patient verbalizes understanding.  Will decrease to 75 mg follow-up in 4 weeks.  Patient verbalizes understanding.  We discussed this at length.           Patient Instructions   Managing Anxiety, Adult  After being diagnosed with an anxiety disorder, you may be relieved to know why you have felt or behaved a certain way. You may also feel overwhelmed about the treatment ahead and what it will mean for your life. With care and support, you can manage this condition and recover from it.  How to manage lifestyle changes  Managing stress and anxiety    Stress is your body's reaction to life changes and events, both good and bad. Most stress will last just a few hours, but stress can be ongoing and can lead to more than just stress. Although stress can play a major role in anxiety, it is not the same as anxiety. Stress is usually caused by something external, such as a deadline, test, or competition. Stress normally passes after the triggering event has ended.   Anxiety is caused by something internal, such as imagining a terrible outcome or worrying that something will go wrong that will devastate you. Anxiety often does not go away even after the triggering event is over, and it can become long-term (chronic) worry. It is important to understand the differences between stress and anxiety and to manage your stress effectively so that it does not lead to an anxious response.  Talk with your health care provider or a counselor to learn more about reducing anxiety and stress. He or she may suggest tension reduction techniques, such as:  · Music therapy. This can include creating or listening to music that you enjoy and that inspires you.  · Mindfulness-based meditation. This involves being aware of your normal breaths while not trying to control your breathing. It can be done while sitting or walking.  · Centering prayer. This involves focusing on a word, phrase, or sacred image that means  something to you and brings you peace.  · Deep breathing. To do this, expand your stomach and inhale slowly through your nose. Hold your breath for 3-5 seconds. Then exhale slowly, letting your stomach muscles relax.  · Self-talk. This involves identifying thought patterns that lead to anxiety reactions and changing those patterns.  · Muscle relaxation. This involves tensing muscles and then relaxing them.  Choose a tension reduction technique that suits your lifestyle and personality. These techniques take time and practice. Set aside 5-15 minutes a day to do them. Therapists can offer counseling and training in these techniques. The training to help with anxiety may be covered by some insurance plans. Other things you can do to manage stress and anxiety include:  · Keeping a stress/anxiety diary. This can help you learn what triggers your reaction and then learn ways to manage your response.  · Thinking about how you react to certain situations. You may not be able to control everything, but you can control your response.  · Making time for activities that help you relax and not feeling guilty about spending your time in this way.  · Visual imagery and yoga can help you stay calm and relax.    Medicines  Medicines can help ease symptoms. Medicines for anxiety include:  · Anti-anxiety drugs.  · Antidepressants.  Medicines are often used as a primary treatment for anxiety disorder. Medicines will be prescribed by a health care provider. When used together, medicines, psychotherapy, and tension reduction techniques may be the most effective treatment.  Relationships  Relationships can play a big part in helping you recover. Try to spend more time connecting with trusted friends and family members. Consider going to couples counseling, taking family education classes, or going to family therapy. Therapy can help you and others better understand your condition.  How to recognize changes in your anxiety  Everyone  responds differently to treatment for anxiety. Recovery from anxiety happens when symptoms decrease and stop interfering with your daily activities at home or work. This may mean that you will start to:  · Have better concentration and focus. Worry will interfere less in your daily thinking.  · Sleep better.  · Be less irritable.  · Have more energy.  · Have improved memory.  It is important to recognize when your condition is getting worse. Contact your health care provider if your symptoms interfere with home or work and you feel like your condition is not improving.  Follow these instructions at home:  Activity  · Exercise. Most adults should do the following:  ? Exercise for at least 150 minutes each week. The exercise should increase your heart rate and make you sweat (moderate-intensity exercise).  ? Strengthening exercises at least twice a week.  · Get the right amount and quality of sleep. Most adults need 7-9 hours of sleep each night.  Lifestyle    · Eat a healthy diet that includes plenty of vegetables, fruits, whole grains, low-fat dairy products, and lean protein. Do not eat a lot of foods that are high in solid fats, added sugars, or salt.  · Make choices that simplify your life.  · Do not use any products that contain nicotine or tobacco, such as cigarettes, e-cigarettes, and chewing tobacco. If you need help quitting, ask your health care provider.  · Avoid caffeine, alcohol, and certain over-the-counter cold medicines. These may make you feel worse. Ask your pharmacist which medicines to avoid.  General instructions  · Take over-the-counter and prescription medicines only as told by your health care provider.  · Keep all follow-up visits as told by your health care provider. This is important.  Where to find support  You can get help and support from these sources:  · Self-help groups.  · Online and community organizations.  · A trusted spiritual leader.  · Couples counseling.  · Family education  classes.  · Family therapy.  Where to find more information  You may find that joining a support group helps you deal with your anxiety. The following sources can help you locate counselors or support groups near you:  · Mental Health [place]: www.mentalhealthamerica.net  · Anxiety and Depression Association of [place] (ADAA): www.adaa.org  · National [place] on Mental Illness (LEE): www.lee.org  Contact a health care provider if you:  · Have a hard time staying focused or finishing daily tasks.  · Spend many hours a day feeling worried about everyday life.  · Become exhausted by worry.  · Start to have headaches, feel tense, or have nausea.  · Urinate more than normal.  · Have diarrhea.  Get help right away if you have:  · A racing heart and shortness of breath.  · Thoughts of hurting yourself or others.  If you ever feel like you may hurt yourself or others, or have thoughts about taking your own life, get help right away. You can go to your nearest emergency department or call:  · Your local emergency services (911 in the [place]  · A suicide crisis helpline, such as the National Suicide Prevention Lifeline at 1-612.892.9203. This is open 24 hours a day.  Summary  · Taking steps to learn and use tension reduction techniques can help calm you and help prevent triggering an anxiety reaction.  · When used together, medicines, psychotherapy, and tension reduction techniques may be the most effective treatment.  · Family, friends, and partners can play a big part in helping you recover from an anxiety disorder.  This information is not intended to replace advice given to you by your health care provider. Make sure you discuss any questions you have with your health care provider.  Document Released: 12/12/2017 Document Revised: 05/19/2020 Document Reviewed: 05/19/2020  Elsevier Patient Education © 2020 Elsevier Inc.      Major Depressive Disorder, Adult  Major depressive disorder (MDD) is a mental health condition.  MDD often makes you feel sad, hopeless, or helpless. MDD can also cause symptoms in your body. MDD can affect your:  · Work.  · School.  · Relationships.  · Other normal activities.  MDD can range from mild to very bad. It may occur once (single episode MDD). It can also occur many times (recurrent MDD).  The main symptoms of MDD often include:  · Feeling sad, depressed, or irritable most of the time.  · Loss of interest.  MDD symptoms also include:  · Sleeping too much or too little.  · Eating too much or too little.  · A change in your weight.  · Feeling tired (fatigue) or having low energy.  · Feeling worthless.  · Feeling guilty.  · Trouble making decisions.  · Trouble thinking clearly.  · Thoughts of suicide or harming others.  · Feeling weak.  · Feeling agitated.  · Keeping yourself from being around other people (isolation).  Follow these instructions at home:  Activity  · Do these things as told by your doctor:  ? Go back to your normal activities.  ? Exercise regularly.  ? Spend time outdoors.  Alcohol  · Talk with your doctor about how alcohol can affect your antidepressant medicines.  · Do not drink alcohol. Or, limit how much alcohol you drink.  ? This means no more than 1 drink a day for nonpregnant women and 2 drinks a day for men. One drink equals one of these:  § 12 oz of beer.  § 5 oz of wine.  § 1½ oz of hard liquor.  General instructions  · Take over-the-counter and prescription medicines only as told by your doctor.  · Eat a healthy diet.  · Get plenty of sleep.  · Find activities that you enjoy. Make time to do them.  · Think about joining a support group. Your doctor may be able to suggest a group for you.  · Keep all follow-up visits as told by your doctor. This is important.  Where to find more information:  · National [place] on Mental Illness:  ? www.tyler.org  · [place] National [place] of Mental Health:  ? www.nimh.nih.gov  · National Suicide Prevention Lifeline:  ? 1-911.504.5127. This is  free, 24-hour help.  Contact a doctor if:  · Your symptoms get worse.  · You have new symptoms.  Get help right away if:  · You self-harm.  · You see, hear, taste, smell, or feel things that are not present (hallucinate).  If you ever feel like you may hurt yourself or others, or have thoughts about taking your own life, get help right away. You can go to your nearest emergency department or call:  · Your local emergency services (911 in the [place]  · A suicide crisis helpline, such as the National Suicide Prevention Lifeline:  ? 0-315-711-7611. This is open 24 hours a day.  This information is not intended to replace advice given to you by your health care provider. Make sure you discuss any questions you have with your health care provider.  Document Released: 11/28/2016 Document Revised: 11/30/2018 Document Reviewed: 09/03/2017  Elsevier Patient Education © 2020 Elsevier Inc.

## 2020-11-19 ENCOUNTER — OFFICE VISIT (OUTPATIENT)
Dept: FAMILY MEDICINE CLINIC | Facility: CLINIC | Age: 25
End: 2020-11-19

## 2020-11-19 VITALS
TEMPERATURE: 96.8 F | SYSTOLIC BLOOD PRESSURE: 120 MMHG | HEART RATE: 103 BPM | WEIGHT: 165 LBS | BODY MASS INDEX: 27.49 KG/M2 | OXYGEN SATURATION: 97 % | DIASTOLIC BLOOD PRESSURE: 76 MMHG | HEIGHT: 65 IN

## 2020-11-19 DIAGNOSIS — J30.89 NON-SEASONAL ALLERGIC RHINITIS, UNSPECIFIED TRIGGER: ICD-10-CM

## 2020-11-19 DIAGNOSIS — Z00.01 ENCOUNTER FOR GENERAL ADULT MEDICAL EXAMINATION WITH ABNORMAL FINDINGS: ICD-10-CM

## 2020-11-19 DIAGNOSIS — F17.200 CURRENT EVERY DAY SMOKER: ICD-10-CM

## 2020-11-19 DIAGNOSIS — R53.83 FATIGUE, UNSPECIFIED TYPE: Primary | ICD-10-CM

## 2020-11-19 DIAGNOSIS — F41.9 ANXIETY: ICD-10-CM

## 2020-11-19 DIAGNOSIS — F33.0 MAJOR DEPRESSIVE DISORDER, RECURRENT, MILD (HCC): ICD-10-CM

## 2020-11-19 PROCEDURE — 99213 OFFICE O/P EST LOW 20 MIN: CPT | Performed by: NURSE PRACTITIONER

## 2020-11-19 PROCEDURE — 99395 PREV VISIT EST AGE 18-39: CPT | Performed by: NURSE PRACTITIONER

## 2020-11-19 PROCEDURE — 90686 IIV4 VACC NO PRSV 0.5 ML IM: CPT | Performed by: NURSE PRACTITIONER

## 2020-11-19 PROCEDURE — 99406 BEHAV CHNG SMOKING 3-10 MIN: CPT | Performed by: NURSE PRACTITIONER

## 2020-11-19 PROCEDURE — 90471 IMMUNIZATION ADMIN: CPT | Performed by: NURSE PRACTITIONER

## 2020-11-19 RX ORDER — FLUTICASONE PROPIONATE 50 MCG
2 SPRAY, SUSPENSION (ML) NASAL DAILY
Qty: 16 G | Refills: 5 | Status: SHIPPED | OUTPATIENT
Start: 2020-11-19 | End: 2021-08-24

## 2020-11-19 NOTE — PATIENT INSTRUCTIONS
Preventive Care 21-39 Years Old, Female  Preventive care refers to visits with your health care provider and lifestyle choices that can promote health and wellness. This includes:  · A yearly physical exam. This may also be called an annual well check.  · Regular dental visits and eye exams.  · Immunizations.  · Screening for certain conditions.  · Healthy lifestyle choices, such as eating a healthy diet, getting regular exercise, not using drugs or products that contain nicotine and tobacco, and limiting alcohol use.  What can I expect for my preventive care visit?  Physical exam  Your health care provider will check your:  · Height and weight. This may be used to calculate body mass index (BMI), which tells if you are at a healthy weight.  · Heart rate and blood pressure.  · Skin for abnormal spots.  Counseling  Your health care provider may ask you questions about your:  · Alcohol, tobacco, and drug use.  · Emotional well-being.  · Home and relationship well-being.  · Sexual activity.  · Eating habits.  · Work and work environment.  · Method of birth control.  · Menstrual cycle.  · Pregnancy history.  What immunizations do I need?    Influenza (flu) vaccine  · This is recommended every year.  Tetanus, diphtheria, and pertussis (Tdap) vaccine  · You may need a Td booster every 10 years.  Varicella (chickenpox) vaccine  · You may need this if you have not been vaccinated.  Human papillomavirus (HPV) vaccine  · If recommended by your health care provider, you may need three doses over 6 months.  Measles, mumps, and rubella (MMR) vaccine  · You may need at least one dose of MMR. You may also need a second dose.  Meningococcal conjugate (MenACWY) vaccine  · One dose is recommended if you are age 19-21 years and a first-year college student living in a residence [place], or if you have one of several medical conditions. You may also need additional booster doses.  Pneumococcal conjugate (PCV13) vaccine  · You may need  this if you have certain conditions and were not previously vaccinated.  Pneumococcal polysaccharide (PPSV23) vaccine  · You may need one or two doses if you smoke cigarettes or if you have certain conditions.  Hepatitis A vaccine  · You may need this if you have certain conditions or if you travel or work in places where you may be exposed to hepatitis A.  Hepatitis B vaccine  · You may need this if you have certain conditions or if you travel or work in places where you may be exposed to hepatitis B.  Haemophilus influenzae type b (Hib) vaccine  · You may need this if you have certain conditions.  You may receive vaccines as individual doses or as more than one vaccine together in one shot (combination vaccines). Talk with your health care provider about the risks and benefits of combination vaccines.  What tests do I need?    Blood tests  · Lipid and cholesterol levels. These may be checked every 5 years starting at age 20.  · Hepatitis C test.  · Hepatitis B test.  Screening  · Diabetes screening. This is done by checking your blood sugar (glucose) after you have not eaten for a while (fasting).  · Sexually transmitted disease (STD) testing.  · BRCA-related cancer screening. This may be done if you have a family history of breast, ovarian, tubal, or peritoneal cancers.  · Pelvic exam and Pap test. This may be done every 3 years starting at age 21. Starting at age 30, this may be done every 5 years if you have a Pap test in combination with an HPV test.  Talk with your health care provider about your test results, treatment options, and if necessary, the need for more tests.  Follow these instructions at home:  Eating and drinking    · Eat a diet that includes fresh fruits and vegetables, whole grains, lean protein, and low-fat dairy.  · Take vitamin and mineral supplements as recommended by your health care provider.  · Do not drink alcohol if:  ? Your health care provider tells you not to drink.  ? You are  pregnant, may be pregnant, or are planning to become pregnant.  · If you drink alcohol:  ? Limit how much you have to 0-1 drink a day.  ? Be aware of how much alcohol is in your drink. In the [place] one drink equals one 12 oz bottle of beer (355 mL), one 5 oz glass of wine (148 mL), or one 1½ oz glass of hard liquor (44 mL).  Lifestyle  · Take daily care of your teeth and gums.  · Stay active. Exercise for at least 30 minutes on 5 or more days each week.  · Do not use any products that contain nicotine or tobacco, such as cigarettes, e-cigarettes, and chewing tobacco. If you need help quitting, ask your health care provider.  · If you are sexually active, practice safe sex. Use a condom or other form of birth control (contraception) in order to prevent pregnancy and STIs (sexually transmitted infections). If you plan to become pregnant, see your health care provider for a preconception visit.  What's next?  · Visit your health care provider once a year for a well check visit.  · Ask your health care provider how often you should have your eyes and teeth checked.  · Stay up to date on all vaccines.  This information is not intended to replace advice given to you by your health care provider. Make sure you discuss any questions you have with your health care provider.  Document Released: 02/13/2003 Document Revised: 08/29/2019 Document Reviewed: 08/29/2019  Elsevier Patient Education © 2020 Elsevier Inc.
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
## 2020-11-20 PROBLEM — R73.09 ELEVATED GLUCOSE LEVEL: Status: ACTIVE | Noted: 2020-11-20

## 2020-11-20 LAB
25(OH)D3+25(OH)D2 SERPL-MCNC: 31.6 NG/ML (ref 30–100)
ALBUMIN SERPL-MCNC: 4.3 G/DL (ref 3.9–5)
ALBUMIN/GLOB SERPL: 1.5 {RATIO} (ref 1.2–2.2)
ALP SERPL-CCNC: 85 IU/L (ref 39–117)
ALT SERPL-CCNC: 20 IU/L (ref 0–32)
AST SERPL-CCNC: 20 IU/L (ref 0–40)
BASOPHILS # BLD AUTO: 0.1 X10E3/UL (ref 0–0.2)
BASOPHILS NFR BLD AUTO: 1 %
BILIRUB SERPL-MCNC: <0.2 MG/DL (ref 0–1.2)
BUN SERPL-MCNC: 20 MG/DL (ref 6–20)
BUN/CREAT SERPL: 26 (ref 9–23)
CALCIUM SERPL-MCNC: 9.7 MG/DL (ref 8.7–10.2)
CHLORIDE SERPL-SCNC: 105 MMOL/L (ref 96–106)
CHOLEST SERPL-MCNC: 189 MG/DL (ref 100–199)
CO2 SERPL-SCNC: 22 MMOL/L (ref 20–29)
CREAT SERPL-MCNC: 0.76 MG/DL (ref 0.57–1)
EOSINOPHIL # BLD AUTO: 0.1 X10E3/UL (ref 0–0.4)
EOSINOPHIL NFR BLD AUTO: 2 %
ERYTHROCYTE [DISTWIDTH] IN BLOOD BY AUTOMATED COUNT: 11.7 % (ref 11.7–15.4)
GLOBULIN SER CALC-MCNC: 2.8 G/DL (ref 1.5–4.5)
GLUCOSE SERPL-MCNC: 114 MG/DL (ref 65–99)
HCT VFR BLD AUTO: 39.8 % (ref 34–46.6)
HDLC SERPL-MCNC: 52 MG/DL
HGB BLD-MCNC: 13.5 G/DL (ref 11.1–15.9)
IMM GRANULOCYTES # BLD AUTO: 0 X10E3/UL (ref 0–0.1)
IMM GRANULOCYTES NFR BLD AUTO: 0 %
LDLC SERPL CALC-MCNC: 92 MG/DL (ref 0–99)
LYMPHOCYTES # BLD AUTO: 3.1 X10E3/UL (ref 0.7–3.1)
LYMPHOCYTES NFR BLD AUTO: 36 %
MAGNESIUM SERPL-MCNC: 2.2 MG/DL (ref 1.6–2.3)
MCH RBC QN AUTO: 28.4 PG (ref 26.6–33)
MCHC RBC AUTO-ENTMCNC: 33.9 G/DL (ref 31.5–35.7)
MCV RBC AUTO: 84 FL (ref 79–97)
MONOCYTES # BLD AUTO: 0.5 X10E3/UL (ref 0.1–0.9)
MONOCYTES NFR BLD AUTO: 6 %
NEUTROPHILS # BLD AUTO: 4.8 X10E3/UL (ref 1.4–7)
NEUTROPHILS NFR BLD AUTO: 55 %
PLATELET # BLD AUTO: 342 X10E3/UL (ref 150–450)
POTASSIUM SERPL-SCNC: 4.2 MMOL/L (ref 3.5–5.2)
PROT SERPL-MCNC: 7.1 G/DL (ref 6–8.5)
RBC # BLD AUTO: 4.75 X10E6/UL (ref 3.77–5.28)
SODIUM SERPL-SCNC: 140 MMOL/L (ref 134–144)
TRIGL SERPL-MCNC: 271 MG/DL (ref 0–149)
TSH SERPL DL<=0.005 MIU/L-ACNC: 1.29 UIU/ML (ref 0.45–4.5)
VIT B12 SERPL-MCNC: 867 PG/ML (ref 232–1245)
VLDLC SERPL CALC-MCNC: 45 MG/DL (ref 5–40)
WBC # BLD AUTO: 8.6 X10E3/UL (ref 3.4–10.8)

## 2020-11-23 LAB
HBA1C MFR BLD: 5.1 % (ref 4.8–5.6)
WRITTEN AUTHORIZATION: NORMAL

## 2020-12-17 ENCOUNTER — OFFICE VISIT (OUTPATIENT)
Dept: FAMILY MEDICINE CLINIC | Facility: CLINIC | Age: 25
End: 2020-12-17

## 2020-12-17 VITALS
TEMPERATURE: 97.7 F | OXYGEN SATURATION: 98 % | BODY MASS INDEX: 28.12 KG/M2 | SYSTOLIC BLOOD PRESSURE: 124 MMHG | HEIGHT: 65 IN | DIASTOLIC BLOOD PRESSURE: 68 MMHG | HEART RATE: 102 BPM | WEIGHT: 168.8 LBS

## 2020-12-17 DIAGNOSIS — F41.9 ANXIETY: Primary | ICD-10-CM

## 2020-12-17 DIAGNOSIS — F33.0 MAJOR DEPRESSIVE DISORDER, RECURRENT, MILD (HCC): ICD-10-CM

## 2020-12-17 PROCEDURE — 99213 OFFICE O/P EST LOW 20 MIN: CPT | Performed by: NURSE PRACTITIONER

## 2020-12-17 RX ORDER — CITALOPRAM 20 MG/1
TABLET ORAL
Qty: 30 TABLET | Refills: 2 | Status: SHIPPED | OUTPATIENT
Start: 2020-12-17 | End: 2021-01-14 | Stop reason: SDUPTHER

## 2020-12-17 NOTE — PROGRESS NOTES
Subjective   Estephania Pathak is a 25 y.o. female.     Chief Complaint   Patient presents with   • Follow-up     4 week follow up        History of Present Illness       Patient is here today for follow up on mood.  We didn't change medications for anxiety/depression last visit.    She hasn't been taking venlafaxine at all since she ran out.    She states she has an issue with energy and motivation.     She states she spoke with her mom and she recommended that she started on Citalopram because she is on it and it worked well for her.          The following portions of the patient's history were reviewed and updated as appropriate: allergies, current medications, past family history, past medical history, past social history, past surgical history and problem list.    Past Medical History:   Diagnosis Date   • Hepatitis-C     states not active, never had treatment.     • History of ADHD     AGE 6 TO 12,  TOOK PRESCRIPTION.   • History of vertebral fracture     L5. no surgery, brace and PT   • Hx of drug abuse (CMS/HCC)    • Kidney stone 12/25/2018   • Opiate addiction (CMS/HCC)        Past Surgical History:   Procedure Laterality Date   • D&C WITH SUCTION N/A 3/15/2019    Procedure: DILATATION AND CURETTAGE WITH SUCTION;  Surgeon: Megan Campos MD;  Location: [facility];  Service: Obstetrics/Gynecology   • KIDNEY STONE SURGERY     • WISDOM TOOTH EXTRACTION         Family History   Problem Relation Age of Onset   • Endometriosis Mother    • Malig Hyperthermia Neg Hx    • Breast cancer Neg Hx    • Uterine cancer Neg Hx    • Ovarian cancer Neg Hx    • Colon cancer Neg Hx    • Congenital heart disease Neg Hx        Social History     Socioeconomic History   • Marital status: Single     Spouse name: Not on file   • Number of children: 0   • Years of education: Not on file   • Highest education level: Not on file   Occupational History   • Occupation: UNEMPLOYED   Tobacco Use   • Smoking status: Current Every Day  Smoker     Packs/day: 0.25     Years: 10.00     Pack years: 2.50     Types: Cigarettes   • Smokeless tobacco: Never Used   • Tobacco comment: not  interested currently-trying to quit on own   Substance and Sexual Activity   • Alcohol use: No   • Drug use: Not Currently     Types: Marijuana, Hydrocodone, Cocaine(coke), Fentanyl, Heroin     Comment: heroin daily, pain pills-occasionally, a month for marijuana   • Sexual activity: Yes     Partners: Male     Birth control/protection: None, Condom   Social History Narrative    GYN PATIENT SINCE 2009.         Current Outpatient Medications:   •  docusate sodium (COLACE) 50 MG capsule, Take  by mouth 2 (Two) Times a Day., Disp: , Rfl:   •  fluticasone (Flonase) 50 MCG/ACT nasal spray, 2 sprays into the nostril(s) as directed by provider Daily., Disp: 16 g, Rfl: 5  •  ibuprofen (ADVIL,MOTRIN) 600 MG tablet, Take 1 tablet by mouth Every 6 (Six) Hours., Disp: 30 tablet, Rfl: 0  •  Magnesium Oxide 500 MG tablet, Take 1 tablet by mouth Daily., Disp: , Rfl:   •  METHADONE HCL PO, Take 150 mg by mouth Daily., Disp: , Rfl:   •  Prenatal Vit-Fe Fumarate-FA (PRENATAL VITAMIN 27-0.8) 27-0.8 MG tablet tablet, Take 1 tablet by mouth Daily., Disp: 90 tablet, Rfl: 3  •  citalopram (CeleXA) 20 MG tablet, Take 1/2 tablet by mouth daily, then increase to 1 tablet daily., Disp: 30 tablet, Rfl: 2  •  mineral oil-hydrophilic petrolatum (AQUAPHOR) ointment, Apply  topically to the appropriate area as directed As Needed for Dry Skin., Disp: 396 g, Rfl: 0    Review of Systems   Constitutional: Negative for fatigue and fever.   Respiratory: Negative for cough, shortness of breath and wheezing.    Cardiovascular: Negative for chest pain.   Gastrointestinal: Negative for abdominal pain, constipation, diarrhea, nausea and vomiting.   Genitourinary: Negative for dysuria and urgency.   Skin: Negative.    Neurological: Negative for dizziness and headache.   Psychiatric/Behavioral: Negative for suicidal  "ideas and depressed mood. The patient is nervous/anxious.        Objective   Vitals:    12/17/20 1525   BP: 124/68   Pulse: 102   Temp: 97.7 °F (36.5 °C)   SpO2: 98%   Weight: 76.6 kg (168 lb 12.8 oz)   Height: 165.1 cm (65\")     Body mass index is 28.09 kg/m².  Physical Exam  Constitutional:       Appearance: Normal appearance.   Cardiovascular:      Rate and Rhythm: Normal rate and regular rhythm.      Pulses: Normal pulses.   Pulmonary:      Effort: Pulmonary effort is normal.      Breath sounds: Normal breath sounds.   Skin:     General: Skin is warm and dry.   Neurological:      Mental Status: She is alert and oriented to person, place, and time.   Psychiatric:         Mood and Affect: Mood normal.         Behavior: Behavior normal.         Thought Content: Thought content normal.         Judgment: Judgment normal.           Assessment/Plan   Diagnoses and all orders for this visit:    1. Anxiety (Primary)    2. Major depressive disorder, recurrent, mild (CMS/HCC)    Other orders  -     citalopram (CeleXA) 20 MG tablet; Take 1/2 tablet by mouth daily, then increase to 1 tablet daily.  Dispense: 30 tablet; Refill: 2  -     mineral oil-hydrophilic petrolatum (AQUAPHOR) ointment; Apply  topically to the appropriate area as directed As Needed for Dry Skin.  Dispense: 396 g; Refill: 0        We will trial Celexa.  If any worsening mood she should notify provider.  If any suicidal homicidal ideation she is to go the ER.  Patient verbalizes understanding.         There are no Patient Instructions on file for this visit.        "

## 2020-12-18 ENCOUNTER — TELEPHONE (OUTPATIENT)
Dept: FAMILY MEDICINE CLINIC | Facility: CLINIC | Age: 25
End: 2020-12-18

## 2020-12-18 NOTE — TELEPHONE ENCOUNTER
Pt aware to take 1/2 pill for 1 week then increase to a whole pill.  Also advised she will be ok after taking the whole pill she took this morning, however the rest of the week she needs to only take 1/2 tab to decrease the risk of side effects

## 2020-12-18 NOTE — TELEPHONE ENCOUNTER
PT CALLED STATING SHE PICKED UP HER citalopram (CeleXA) 20 MG tablet TODAY AND TOOK A WHOLE TABLET BEFORE READING THAT SHE WAS SUPPOSED TO START WITH A HALF TABLET.    SHE ALSO STATES IT SAYS TO INCREASE TO A WHOLE TABLET, BUT DOES NOT STATE WHEN.    SHE WOULD LIKE A CALL BACK TO DISCUSS. SHE CAN BE REACHED [phone]

## 2021-01-14 ENCOUNTER — TELEPHONE (OUTPATIENT)
Dept: FAMILY MEDICINE CLINIC | Facility: CLINIC | Age: 26
End: 2021-01-14

## 2021-01-14 ENCOUNTER — OFFICE VISIT (OUTPATIENT)
Dept: FAMILY MEDICINE CLINIC | Facility: CLINIC | Age: 26
End: 2021-01-14

## 2021-01-14 VITALS
WEIGHT: 168.8 LBS | DIASTOLIC BLOOD PRESSURE: 80 MMHG | HEART RATE: 74 BPM | OXYGEN SATURATION: 97 % | TEMPERATURE: 97.7 F | SYSTOLIC BLOOD PRESSURE: 122 MMHG | BODY MASS INDEX: 28.12 KG/M2 | HEIGHT: 65 IN

## 2021-01-14 DIAGNOSIS — F33.0 MAJOR DEPRESSIVE DISORDER, RECURRENT, MILD (HCC): ICD-10-CM

## 2021-01-14 DIAGNOSIS — F41.9 ANXIETY: ICD-10-CM

## 2021-01-14 DIAGNOSIS — N76.0 BV (BACTERIAL VAGINOSIS): ICD-10-CM

## 2021-01-14 DIAGNOSIS — N89.8 VAGINAL ODOR: Primary | ICD-10-CM

## 2021-01-14 DIAGNOSIS — B96.89 BV (BACTERIAL VAGINOSIS): ICD-10-CM

## 2021-01-14 LAB
BACTERIAL VAGINOSIS VAG-IMP: POSITIVE
BILIRUB BLD-MCNC: ABNORMAL MG/DL
EXPIRATION DATE: ABNORMAL
GLUCOSE UR STRIP-MCNC: NEGATIVE MG/DL
KETONES UR QL: NEGATIVE
LEUKOCYTE EST, POC: NEGATIVE
Lab: ABNORMAL
NITRITE UR-MCNC: NEGATIVE MG/ML
PH UR: 6 [PH] (ref 5–8)
PROT UR STRIP-MCNC: NEGATIVE MG/DL
RBC # UR STRIP: NEGATIVE /UL
SP GR UR: 1.02 (ref 1–1.03)
TRICHOMONAS, POC: NEGATIVE
UROBILINOGEN UR QL: NORMAL

## 2021-01-14 PROCEDURE — 87899 AGENT NOS ASSAY W/OPTIC: CPT | Performed by: NURSE PRACTITIONER

## 2021-01-14 PROCEDURE — 99214 OFFICE O/P EST MOD 30 MIN: CPT | Performed by: NURSE PRACTITIONER

## 2021-01-14 RX ORDER — CITALOPRAM 40 MG/1
40 TABLET ORAL DAILY
Qty: 30 TABLET | Refills: 2 | Status: SHIPPED | OUTPATIENT
Start: 2021-01-14 | End: 2021-04-12

## 2021-01-14 RX ORDER — METRONIDAZOLE 7.5 MG/G
GEL VAGINAL 2 TIMES DAILY
Qty: 70 G | Refills: 0 | Status: SHIPPED | OUTPATIENT
Start: 2021-01-14 | End: 2021-01-15

## 2021-01-14 NOTE — PROGRESS NOTES
"Chief Complaint  Depression and Vaginal Discharge (vaginal odor)    Subjective          Estephania Pathak presents to [facility] for   History of Present Illness    Patient is here today for follow up on mood.      We started her on citalopram last visit and she states she feels like it is actually working.  Would like to trial a higher dose. States she had more energy to do things for several days.  States that it didn't last all day, but has improved.    Denies any side effects to the medication that she knows of.       She also has a new complaint of vaginal odor and discharge. She states she has been having it since she had her son.  She was told it was just her hormones and was happening, but it continuing.     Denies fishy odor.   Denies any burning or pain with urination.  Reports itching randomly, but not vaginal itching.         Objective   Vital Signs:   /80   Pulse 74   Temp 97.7 °F (36.5 °C)   Ht 165.1 cm (65\")   Wt 76.6 kg (168 lb 12.8 oz)   SpO2 97%   BMI 28.09 kg/m²     Physical Exam  Constitutional:       Appearance: Normal appearance.   Cardiovascular:      Rate and Rhythm: Normal rate and regular rhythm.      Pulses: Normal pulses.   Pulmonary:      Effort: Pulmonary effort is normal.      Breath sounds: Normal breath sounds.   Skin:     General: Skin is warm and dry.   Neurological:      Mental Status: She is alert.   Psychiatric:         Mood and Affect: Mood normal.         Behavior: Behavior normal.         Thought Content: Thought content normal.         Judgment: Judgment normal.        Result Review :                 Assessment and Plan    Problem List Items Addressed This Visit     None      Visit Diagnoses     Vaginal odor    -  Primary    Relevant Orders    POC Urinalysis Dipstick, Automated (Completed)    POCT trichomonas (Completed)    POCT Bacterial Vaginosis Rapid Test (Completed)    BV (bacterial vaginosis)            Anxiety/depression-seems to " be improving with the citalopram.  We will increase escitalopram dose.  If any worsening mood notify provider.  If any suicidal homicidal ideation she is to go to ER.  Follow-up in 4 weeks for medication management.  Patient verbalizes understanding.    Patient had positive screening for bacterial vaginosis.  I will start her on MetroGel vaginally.  She is to use this twice daily as directed.  I discussed the diagnosis of a vaginosis and how to treat and prevent.  Patient verbalizes understanding.  Follow-up if no resolution of symptoms.      Follow Up   Return in about 4 weeks (around 2/11/2021), or if symptoms worsen or fail to improve.  Patient was given instructions and counseling regarding her condition or for health maintenance advice. Please see specific information pulled into the AVS if appropriate.

## 2021-01-14 NOTE — TELEPHONE ENCOUNTER
PT CALLED STATING THAT SHE WAS JUST INFORMED BY HER PHARMACY THAT SHE NEEDS A PRIOR AUTHORIZATION COMPLETED FOR HER INSURANCE TO COVER HER metroNIDAZOLE (METROGEL VAGINAL) 0.75 % vaginal gel    SHE IS HOPING THIS CAN BE TAKEN CARE OF QUICKLY SO SHE CAN GET THE MEDICATION. SHE CAN BE REACHED [phone]

## 2021-01-15 RX ORDER — METRONIDAZOLE 500 MG/1
500 TABLET ORAL 2 TIMES DAILY
Qty: 14 TABLET | Refills: 0 | Status: SHIPPED | OUTPATIENT
Start: 2021-01-15 | End: 2021-03-29

## 2021-01-15 NOTE — TELEPHONE ENCOUNTER
Please let patient know I am sending in the oral option.  I thought the cream was covered and wanted to avoid since she is breastfeeding, but the oral is considered safe for breastfeeding as well.

## 2021-01-28 ENCOUNTER — TELEPHONE (OUTPATIENT)
Dept: OBSTETRICS AND GYNECOLOGY | Age: 26
End: 2021-01-28

## 2021-01-28 RX ORDER — MAGNESIUM OXIDE 400 MG/1
400 TABLET ORAL DAILY
Qty: 90 TABLET | Refills: 3 | Status: SHIPPED | OUTPATIENT
Start: 2021-01-28 | End: 2021-12-07 | Stop reason: SDUPTHER

## 2021-01-28 NOTE — TELEPHONE ENCOUNTER
Patient called and stated she is out of the Magnesium 500mg tablet.  She states the insurance will cover Magnesium 400 mg tablet without a prior authorization.  She is wanting to know if she can take the 400 mg tablet of Magnesium if so she needs prescription sent over to pharmacy.  Please advise.

## 2021-02-11 ENCOUNTER — TELEPHONE (OUTPATIENT)
Dept: FAMILY MEDICINE CLINIC | Facility: CLINIC | Age: 26
End: 2021-02-11

## 2021-02-11 ENCOUNTER — TELEMEDICINE (OUTPATIENT)
Dept: FAMILY MEDICINE CLINIC | Facility: CLINIC | Age: 26
End: 2021-02-11

## 2021-02-11 DIAGNOSIS — F41.9 ANXIETY: Primary | ICD-10-CM

## 2021-02-11 DIAGNOSIS — F33.0 MAJOR DEPRESSIVE DISORDER, RECURRENT, MILD (HCC): ICD-10-CM

## 2021-02-11 PROCEDURE — 99213 OFFICE O/P EST LOW 20 MIN: CPT | Performed by: NURSE PRACTITIONER

## 2021-02-11 NOTE — TELEPHONE ENCOUNTER
----- Message from JACK Lewis sent at 2/11/2021  3:28 PM EST -----  Please call patient and tell her after I have researched medication options I do not feel comfortable starting her on a mood stabilizer as discussed.  I can refer her to psychiatry for further evaluation.  This may be a better option for her.  Please see if she prefers this and I will put referral in.  She may be seeing psychiatry at [facility] I am not sure.

## 2021-02-11 NOTE — TELEPHONE ENCOUNTER
Called and left message with Bebe for referral.  Discussed with her what is going on.  I left my number for her to reach back out with any questions.

## 2021-02-11 NOTE — TELEPHONE ENCOUNTER
[facility]   If so we can just send update to them?   If not, I can make referral

## 2021-02-11 NOTE — TELEPHONE ENCOUNTER
Pt stated she believes that is where her counselor is through, she stated it was fine to update them

## 2021-02-11 NOTE — TELEPHONE ENCOUNTER
Pt sees Ar marina with [facility]  [facility]ru [address], believed to work out of office,    [email or web address]  [phone]  Next appt is 02/18 at 11:15   ar will have to put in referral for phych.  I spoke with Elizabeth today and she stated just let Ar know the situation and you are not comfortable prescribing medications and want to refer her out to them

## 2021-02-11 NOTE — TELEPHONE ENCOUNTER
Pt is agreeable to start seeing Psychiatry, she stated she would like to stay as close to [facility] as possible

## 2021-02-11 NOTE — PROGRESS NOTES
You have chosen to receive care through a telephone visit. Do you consent to use a telephone visit for your medical care today? Yes      Chief Complaint  Anxiety    Subjective          Estephania Pathak presents to [facility]  History of Present Illness     Patient presents as a tele-health video visit for follow up on anxiety.   We increased citalopram dosing at last OV on 1/14/2021.  She reports that she feels like it is working still, but she has had a lot of life changes.    She has hit a year ivis on her sobriety.    She just isn't sure how she is feeling.    She does still have days when she wants to do stuff that interest her.    She is still going to counseling.  She sees 2 counselors.      She is contemplating decreasing her dose of methadone at her next follow up.        Objective   Vital Signs:   There were no vitals taken for this visit.    Physical Exam   Result Review :                    Time spent on visit was 17 minutes  Assessment and Plan    Diagnoses and all orders for this visit:    1. Anxiety (Primary)    2. Major depressive disorder, recurrent, mild (CMS/HCC)      Continue citalopram.  After discussion of possibly starting on a mood stabilizer I am not finding one that I am comfortable with starting while she is breast-feeding.  I offered for referral to psychiatry.  Awaiting phone call back.  I would also like her to discuss treatment options with counselor and notify me if any symptoms worsen as she is decreasing her methadone.      Follow Up   Return in about 4 weeks (around 3/11/2021), or if symptoms worsen or fail to improve, for Next scheduled follow up.  Patient was given instructions and counseling regarding her condition or for health maintenance advice. Please see specific information pulled into the AVS if appropriate.

## 2021-02-17 RX ORDER — PRENATAL VIT/IRON FUM/FOLIC AC 27MG-0.8MG
1 TABLET ORAL DAILY
Qty: 90 TABLET | Refills: 3 | Status: SHIPPED | OUTPATIENT
Start: 2021-02-17 | End: 2021-08-18 | Stop reason: SDUPTHER

## 2021-03-11 ENCOUNTER — TELEMEDICINE (OUTPATIENT)
Dept: FAMILY MEDICINE CLINIC | Facility: CLINIC | Age: 26
End: 2021-03-11

## 2021-03-11 DIAGNOSIS — F33.0 MAJOR DEPRESSIVE DISORDER, RECURRENT, MILD (HCC): ICD-10-CM

## 2021-03-11 DIAGNOSIS — F41.9 ANXIETY: Primary | ICD-10-CM

## 2021-03-11 PROCEDURE — 99212 OFFICE O/P EST SF 10 MIN: CPT | Performed by: NURSE PRACTITIONER

## 2021-03-11 NOTE — PROGRESS NOTES
You have chosen to receive care through a telephone visit. Do you consent to use a telephone visit for your medical care today? Yes    Chief Complaint  Anxiety and Depression    Subjective          Estephania Pathak presents to [facility]  History of Present Illness    Patient is here today for follow up on mood. Last visit we decided to continue citalopram.  I did refer to psychiatry.  She states she met with her counselor today and is going to call to get on waiting list for psychiatry.    She reports that her mood has been pretty good lately.  Still having times of energy.     She has also started going to  and putting herself out there to be more social.     She reports she talked to the methadone clinic today and next time she goes in they are going to start decreasing her 5mg every 2 weeks.       Objective   Vital Signs:   There were no vitals taken for this visit.    Physical Exam  Constitutional:       Appearance: Normal appearance.   Neurological:      Mental Status: She is alert and oriented to person, place, and time.   Psychiatric:         Mood and Affect: Mood normal.         Behavior: Behavior normal.         Thought Content: Thought content normal.         Judgment: Judgment normal.        Result Review :                 Assessment and Plan    Diagnoses and all orders for this visit:    1. Anxiety (Primary)    2. Major depressive disorder, recurrent, mild (CMS/HCC)      We will continue medication.  Patient is making great strides for her mental health and sobriety.  She should continue to go to groups, see counselor, await psychiatric evaluation.  We will plan 6 week follow up since we do not know when she will get in for evaluation.  If she has worsening mood get in sooner.  She verbalizes understanding.             Time spent on visit was 12 minutes.       Follow Up   Return in about 6 weeks (around 4/22/2021).  Patient was given instructions and counseling regarding her  condition or for health maintenance advice. Please see specific information pulled into the AVS if appropriate.

## 2021-03-26 ENCOUNTER — TELEPHONE (OUTPATIENT)
Dept: FAMILY MEDICINE CLINIC | Facility: CLINIC | Age: 26
End: 2021-03-26

## 2021-03-26 NOTE — TELEPHONE ENCOUNTER
Please let her know that we do not write notes for those, that would come from counselor if they would consider it.

## 2021-03-26 NOTE — TELEPHONE ENCOUNTER
Caller: Estephania Pathak    Relationship to patient: Self    Best call back number: [phone]    Patient is needing: CYSTS ON LEFT FOREARM ARM, 2 ON RIGHT THIGH.    PATIENT IS ALSO ASKING FOR A NOTE TO BE WRITTEN TO HER [place] STATING THAT SHE WOULD QUALIFY FOR AN EMOTIONAL SUPPORT ANIMAL.      PLEASE CALL PATIENT TO DISCUSS

## 2021-03-29 ENCOUNTER — TELEPHONE (OUTPATIENT)
Dept: FAMILY MEDICINE CLINIC | Facility: CLINIC | Age: 26
End: 2021-03-29

## 2021-03-29 ENCOUNTER — OFFICE VISIT (OUTPATIENT)
Dept: FAMILY MEDICINE CLINIC | Facility: CLINIC | Age: 26
End: 2021-03-29

## 2021-03-29 VITALS
BODY MASS INDEX: 28.62 KG/M2 | TEMPERATURE: 96.9 F | HEART RATE: 68 BPM | WEIGHT: 171.8 LBS | HEIGHT: 65 IN | DIASTOLIC BLOOD PRESSURE: 68 MMHG | SYSTOLIC BLOOD PRESSURE: 108 MMHG | OXYGEN SATURATION: 98 %

## 2021-03-29 DIAGNOSIS — N89.8 VAGINAL ODOR: Primary | ICD-10-CM

## 2021-03-29 LAB
BACTERIAL VAGINOSIS VAG-IMP: NEGATIVE
BILIRUB BLD-MCNC: NEGATIVE MG/DL
CLARITY, POC: CLEAR
COLOR UR: YELLOW
EXPIRATION DATE: 2022
GLUCOSE UR STRIP-MCNC: NEGATIVE MG/DL
KETONES UR QL: NEGATIVE
LEUKOCYTE EST, POC: NEGATIVE
Lab: NORMAL
NITRITE UR-MCNC: NEGATIVE MG/ML
PH UR: 6 [PH] (ref 5–8)
PROT UR STRIP-MCNC: NEGATIVE MG/DL
RBC # UR STRIP: NEGATIVE /UL
SP GR UR: 1.02 (ref 1–1.03)
UROBILINOGEN UR QL: NORMAL

## 2021-03-29 PROCEDURE — 99213 OFFICE O/P EST LOW 20 MIN: CPT | Performed by: NURSE PRACTITIONER

## 2021-03-29 RX ORDER — SULFAMETHOXAZOLE AND TRIMETHOPRIM 800; 160 MG/1; MG/1
1 TABLET ORAL 2 TIMES DAILY
Qty: 20 TABLET | Refills: 0 | Status: SHIPPED | OUTPATIENT
Start: 2021-03-29 | End: 2021-04-22

## 2021-03-29 NOTE — TELEPHONE ENCOUNTER
Advise her we should just leave them alone and monitor them unless they are growing or changing color or starting to hurt and then we can remove or refer to remove.

## 2021-03-29 NOTE — PROGRESS NOTES
"Chief Complaint  Cyst (on arm and thigh)    Subjective          Estephania Pathak presents to [facility]  History of Present Illness     Patient is here today with complaint of 3 cyst that she wants checked out.   2 on right thigh   1 left forearm   She also had one on her face as a child that she still gets puss out of at times.  Had plastic surgery on as a child.       Also wants checked to make sure \"whatever infection is gone\" and is already left a sample.  She had BV back in January.    She denies any vaginal discharge, pain, itching, urination pain or frequency.        Objective   Vital Signs:   /68   Pulse 68   Temp 96.9 °F (36.1 °C)   Ht 165.1 cm (65\")   Wt 77.9 kg (171 lb 12.8 oz)   SpO2 98%   BMI 28.59 kg/m²     Physical Exam  Constitutional:       Appearance: Normal appearance.   Skin:     General: Skin is warm and dry.             Comments: Small flesh-colored mole noted on left forearm midline.  Small flesh-colored mole noted on right outer thigh.  Larger possible folliculitis noted in right lower thigh.   Neurological:      Mental Status: She is alert and oriented to person, place, and time.   Psychiatric:         Mood and Affect: Mood normal.         Behavior: Behavior normal.         Thought Content: Thought content normal.         Judgment: Judgment normal.        Result Review :                 Assessment and Plan    Diagnoses and all orders for this visit:    1. Vaginal odor (Primary)  -     POC Urinalysis Dipstick  -     POCT Bacterial Vaginosis Rapid Test    Other orders  -     sulfamethoxazole-trimethoprim (Bactrim DS) 800-160 MG per tablet; Take 1 tablet by mouth 2 (Two) Times a Day.  Dispense: 20 tablet; Refill: 0    Will treat with antibiotic.  I would like her to use warm compress treatment several times daily on this.  If she has continued need after 2 weeks she should follow back up with me and we will complete incision and drainage.  She verbalizes " understanding.    We will recheck BMP and urinalysis to make sure that this is gone.    Follow Up   No follow-ups on file.  Patient was given instructions and counseling regarding her condition or for health maintenance advice. Please see specific information pulled into the AVS if appropriate.

## 2021-03-29 NOTE — TELEPHONE ENCOUNTER
Pt has appt today for cyst of forearm.  Pt advised that we don't write notes for emotional support notes.  Pt voiced understanding

## 2021-03-29 NOTE — TELEPHONE ENCOUNTER
Also this pt was wanting to know before she left, if those little moles without color, should she get them removed or should they just be left alone. Please Advise..

## 2021-04-12 RX ORDER — CITALOPRAM 40 MG/1
TABLET ORAL
Qty: 30 TABLET | Refills: 2 | Status: SHIPPED | OUTPATIENT
Start: 2021-04-12 | End: 2021-07-06

## 2021-04-22 ENCOUNTER — TELEMEDICINE (OUTPATIENT)
Dept: FAMILY MEDICINE CLINIC | Facility: CLINIC | Age: 26
End: 2021-04-22

## 2021-04-22 DIAGNOSIS — L72.9 BENIGN CYST OF SKIN: ICD-10-CM

## 2021-04-22 DIAGNOSIS — F33.0 MAJOR DEPRESSIVE DISORDER, RECURRENT, MILD (HCC): ICD-10-CM

## 2021-04-22 DIAGNOSIS — F41.9 ANXIETY: Primary | ICD-10-CM

## 2021-04-22 PROCEDURE — 99213 OFFICE O/P EST LOW 20 MIN: CPT | Performed by: NURSE PRACTITIONER

## 2021-04-22 NOTE — PROGRESS NOTES
You have chosen to receive care through a tele-health visit. Do you consent to use a telephone visit for your medical care today? Yes    Chief Complaint  Anxiety    Subjective          Estephania Pathak presents to [facility]  History of Present Illness     Patient presents today as a tele-health video visit for follow up on anxiety.   At last visit we did not change medication. We continued same dose and patient was getting in with psych, continuing with counseling, NA.  She is still working on weaning at the clinic off the methadone.      Still hasn't met with psychiatry.      Counseling is still going good.    Medication is still working well for her.        She wanted to ask about the cyst on her legs.  The 2 on her right leg, the are both red now.      Objective   Vital Signs:   There were no vitals taken for this visit.    Physical Exam  Constitutional:       Appearance: Normal appearance.   Neurological:      General: No focal deficit present.      Mental Status: She is alert and oriented to person, place, and time.   Psychiatric:         Mood and Affect: Mood normal.         Behavior: Behavior normal.         Thought Content: Thought content normal.         Judgment: Judgment normal.        Result Review :                 Assessment and Plan    Diagnoses and all orders for this visit:    1. Anxiety (Primary)    2. Major depressive disorder, recurrent, mild (CMS/HCC)    3. Benign cyst of skin  -     Ambulatory Referral to General Surgery      We will continue current doses of medicine for anxiety and depression.  Continue to see counseling and psychiatry.  Follow-up with me in 3 months unless mood is getting worse and get in with me sooner.  She verbalizes understanding.    Since cyst are nonpainful I will not give antibiotic today.  Since she has complained about the several times and going to refer to general surgery since she has several of these just for evaluation.  If she has  any pain in these before she gets and she should notify me.  She verbalizes understanding.    Time spent on visit was 13 minutes.      Follow Up   No follow-ups on file.  Patient was given instructions and counseling regarding her condition or for health maintenance advice. Please see specific information pulled into the AVS if appropriate.

## 2021-05-14 ENCOUNTER — OFFICE VISIT (OUTPATIENT)
Dept: SURGERY | Facility: CLINIC | Age: 26
End: 2021-05-14

## 2021-05-14 VITALS
WEIGHT: 171 LBS | DIASTOLIC BLOOD PRESSURE: 72 MMHG | SYSTOLIC BLOOD PRESSURE: 118 MMHG | HEART RATE: 72 BPM | HEIGHT: 65 IN | RESPIRATION RATE: 16 BRPM | BODY MASS INDEX: 28.49 KG/M2

## 2021-05-14 DIAGNOSIS — L98.8 DYSPLASTIC SKIN LESION: ICD-10-CM

## 2021-05-14 DIAGNOSIS — F17.200 CURRENT EVERY DAY SMOKER: Primary | ICD-10-CM

## 2021-05-14 PROCEDURE — 99203 OFFICE O/P NEW LOW 30 MIN: CPT | Performed by: SURGERY

## 2021-05-14 NOTE — PROGRESS NOTES
Estephania Pathak 25 y.o. female presents @ the req of [facility] for eval of 2 cysts RIGHT thigh and 1 cyst LEFT forearm.  Pt reports increase in size and causes issues with personal grooming. + change in color.  Chief Complaint   Patient presents with   • Cyst             HPI     Above-noted agree.  This very pleasant 25-year-old female has 3 lesions that are very suspicious.  They have been increasing in size.  One is on the left forearm and the other 2 are on her right thigh.  She has no fevers or chills.  She has no nausea or vomiting.  She does smoke cigarettes.    Review of Systems   All other systems reviewed and are negative.            Current Outpatient Medications:   •  citalopram (CeleXA) 40 MG tablet, TAKE ONE TABLET BY MOUTH DAILY, Disp: 30 tablet, Rfl: 2  •  docusate sodium (COLACE) 50 MG capsule, Take  by mouth 2 (Two) Times a Day., Disp: , Rfl:   •  fluticasone (Flonase) 50 MCG/ACT nasal spray, 2 sprays into the nostril(s) as directed by provider Daily., Disp: 16 g, Rfl: 5  •  ibuprofen (ADVIL,MOTRIN) 600 MG tablet, Take 1 tablet by mouth Every 6 (Six) Hours., Disp: 30 tablet, Rfl: 0  •  magnesium oxide (MAG-OX) 400 MG tablet, Take 1 tablet by mouth Daily., Disp: 90 tablet, Rfl: 3  •  METHADONE HCL PO, Take 150 mg by mouth Daily., Disp: , Rfl:   •  mineral oil-hydrophilic petrolatum (AQUAPHOR) ointment, Apply  topically to the appropriate area as directed As Needed for Dry Skin., Disp: 396 g, Rfl: 0  •  Prenatal Vit-Fe Fumarate-FA (prenatal vitamin 27-0.8) 27-0.8 MG tablet tablet, Take 1 tablet by mouth Daily., Disp: 90 tablet, Rfl: 3        No Known Allergies        Past Medical History:   Diagnosis Date   • Hepatitis-C     states not active, never had treatment.     • History of ADHD     AGE 6 TO 12,  TOOK PRESCRIPTION.   • History of vertebral fracture     L5. no surgery, brace and PT   • Hx of drug abuse (CMS/HCC)    • Kidney stone 12/25/2018   • Opiate addiction (CMS/HCC)            Past  "Surgical History:   Procedure Laterality Date   • D & C WITH SUCTION N/A 3/15/2019    Procedure: DILATATION AND CURETTAGE WITH SUCTION;  Surgeon: Megan Campos MD;  Location: [facility] OR;  Service: Obstetrics/Gynecology   • KIDNEY STONE SURGERY     • WISDOM TOOTH EXTRACTION             Social History     Tobacco Use   • Smoking status: Current Every Day Smoker     Packs/day: 0.25     Years: 10.00     Pack years: 2.50     Types: Cigarettes   • Smokeless tobacco: Never Used   • Tobacco comment: not  interested currently-trying to quit on own   Substance Use Topics   • Alcohol use: No   • Drug use: Not Currently     Types: Marijuana, Hydrocodone, Cocaine(coke), Fentanyl, Heroin     Comment: heroin daily, pain pills-occasionally, a month for marijuana           Immunization History   Administered Date(s) Administered   • Flu Vaccine Quad PF >18YRS 09/30/2019   • Flulaval/Fluarix/Fluzone Quad 11/19/2020   • Hep A / Hep B 03/07/2018, 04/16/2018   • Influenza Quad Vaccine (Inpatient) 09/30/2019   • Influenza, Unspecified 09/08/2019   • PPD Test 01/13/2020, 02/07/2020   • Td 10/28/2005   • Tdap 02/28/2020           Physical Exam  Vitals and nursing note reviewed.   Constitutional:       Appearance: Normal appearance.   Musculoskeletal:         General: No swelling or tenderness.   Skin:     Comments: The forearm lesion is less than 1 cm in size.  There are 2 lesions of the right thigh.  One is approximate 1 cm and the other is probably 1.5 cm.   Neurological:      General: No focal deficit present.      Mental Status: She is alert and oriented to person, place, and time.   Psychiatric:         Mood and Affect: Mood normal.         Behavior: Behavior normal.         Debilities/Disabilities Identified: None    Emotional Behavior: Appropriate      /72   Pulse 72   Resp 16   Ht 165.1 cm (65\")   Wt 77.6 kg (171 lb)   BMI 28.46 kg/m²         Diagnoses and all orders for this visit:    1. Current every day " smoker (Primary)    2. Dysplastic skin lesion    I encouraged tobacco cessation.  Once her insurance approves excision of the lesions we will bring her back and excise all 3.    Thank you for allowing me to participate in the care of this interesting patient.

## 2021-05-24 NOTE — PROGRESS NOTES
Chief Complaint   Patient presents with   • Routine Prenatal Visit     38w6d, no complaints        HPI:  Pt presents for routine prenatal visit. She notes normal fetal movement today. She states that she testing positive for cocaine at Dr. Herrera and at her methadone clinic. She says that her boyfriend has been using cocaine and she must had drank from his water bottle or was exposed while cleaning it up from counter. She denies use but drug screen was positive .    ROS:  No fever or chills, no nausea or vomiting, no contractions, no leg pain, no LE edema, no leaking fluid, no bleeding, no headache, no dysuria  All other systems reviewed and negative    Exam:  See flow sheet  General:  Alert and oriented and no distress  Neck: no lymphadenopathy or thyromegaly  Lungs: non - labored breathing  Abd:  See flow sheet, fundus nontender  Ext: see flow sheet, non-tender bilateral , no lesions  Neuro: grossly normal    Cervix posterior, barely a fingertip, 20% effaced and -2 station    Assessment:/ PLAN:  24 y.o.  at 38w6d    Opiate use disorder- on methadone. Plan for split dosing while in the hospital. Most recently on 135 mg (would plan 70 mg in AM and 70 in PM). She had + UDS for cocaine with Dr. Herrera on . She denies use but had + screening and confirmed with Dr. Herrera' office that we would recommend against breastfeeding given concern for noncompliance in the third trimeter. See other recommendations in assessment and plan  She is not very favorable, plan for induction next Wednesday evening at 40 weeks as with unfavorable induction more likely to be prolonged or result in CS  Normal BPP today 10/10.   Hep C quant ordered today    Follow up in 1 wk    Olimpia Franco MD  2020  12:25        
Normal

## 2021-07-06 RX ORDER — CITALOPRAM 40 MG/1
TABLET ORAL
Qty: 30 TABLET | Refills: 2 | Status: SHIPPED | OUTPATIENT
Start: 2021-07-06 | End: 2021-07-22 | Stop reason: SDUPTHER

## 2021-07-22 ENCOUNTER — OFFICE VISIT (OUTPATIENT)
Dept: FAMILY MEDICINE CLINIC | Facility: CLINIC | Age: 26
End: 2021-07-22

## 2021-07-22 VITALS
SYSTOLIC BLOOD PRESSURE: 116 MMHG | DIASTOLIC BLOOD PRESSURE: 66 MMHG | WEIGHT: 168 LBS | HEART RATE: 93 BPM | TEMPERATURE: 97.8 F | HEIGHT: 65 IN | BODY MASS INDEX: 27.99 KG/M2 | OXYGEN SATURATION: 98 %

## 2021-07-22 DIAGNOSIS — L20.9 ATOPIC DERMATITIS, UNSPECIFIED TYPE: ICD-10-CM

## 2021-07-22 DIAGNOSIS — F41.9 ANXIETY: Primary | ICD-10-CM

## 2021-07-22 PROCEDURE — 99214 OFFICE O/P EST MOD 30 MIN: CPT | Performed by: NURSE PRACTITIONER

## 2021-07-22 RX ORDER — TRIAMCINOLONE ACETONIDE 1 MG/G
CREAM TOPICAL 2 TIMES DAILY
Qty: 80 G | Refills: 1 | Status: SHIPPED | OUTPATIENT
Start: 2021-07-22 | End: 2022-01-24

## 2021-07-22 RX ORDER — CITALOPRAM 40 MG/1
40 TABLET ORAL DAILY
Qty: 90 TABLET | Refills: 1 | Status: SHIPPED | OUTPATIENT
Start: 2021-07-22 | End: 2021-09-15

## 2021-07-22 NOTE — PROGRESS NOTES
"Chief Complaint  Anxiety (followup) and Rash (on Right foot)    Subjective          Estephania Pathak presents to [facility]  History of Present Illness     Patient presents today for follow-up on anxiety.  She also has a new complaint today of rash on her right foot.    Anxiety-feels like citalopram is working really well for her.  Their only complaint is weight gain.  I did discuss with her she has actually lost 3 pounds since previous visit.  She denies any side effects otherwise to medication.    Rash on right foot.-This is in the medial side.  She denies any itching.  She states that this is coming on for several years.  She does not use any medication on it.  She did trial some sort of cream on it but cannot remember the meaning that did not seem to help.    Objective   Vital Signs:   /66   Pulse 93   Temp 97.8 °F (36.6 °C)   Ht 165.1 cm (65\")   Wt 76.2 kg (168 lb)   SpO2 98%   BMI 27.96 kg/m²     Physical Exam  Constitutional:       Appearance: Normal appearance.   Cardiovascular:      Rate and Rhythm: Normal rate and regular rhythm.      Pulses: Normal pulses.   Pulmonary:      Effort: Pulmonary effort is normal.      Breath sounds: Normal breath sounds.   Skin:     General: Skin is warm and dry.      Findings: Rash present. Rash is papular (Right medial foot just posterior to big toe and slightly behind.  Nontender to palpation.  Slightly nodular.  No scaling.).   Neurological:      Mental Status: She is alert and oriented to person, place, and time.   Psychiatric:         Mood and Affect: Mood normal.         Behavior: Behavior normal.         Thought Content: Thought content normal.         Judgment: Judgment normal.        Result Review :                 Assessment and Plan    Diagnoses and all orders for this visit:    1. Anxiety (Primary)    2. Atopic dermatitis, unspecified type    Other orders  -     triamcinolone (KENALOG) 0.1 % cream; Apply  topically to the " appropriate area as directed 2 (Two) Times a Day.  Dispense: 80 g; Refill: 1  -     citalopram (CeleXA) 40 MG tablet; Take 1 tablet by mouth Daily.  Dispense: 90 tablet; Refill: 1      We will continue current treatment for anxiety.  If any worsening mood notify provider.  If any thoughts of hurting self or others go to the ER.  We will follow-up in 6 months for medication management.  If she would like to trial new medicine based on weight she just needs to follow-up with me sooner.    Trial steroid cream for rash on foot.  If no improvement notify provider and we will trial a different medication.  I am not suspect based on symptoms and evaluation of fungal disease at this point.        Follow Up   Return in about 6 months (around 1/22/2022) for Annual physical.  Patient was given instructions and counseling regarding her condition or for health maintenance advice. Please see specific information pulled into the AVS if appropriate.

## 2021-07-27 ENCOUNTER — OFFICE VISIT (OUTPATIENT)
Dept: OBSTETRICS AND GYNECOLOGY | Age: 26
End: 2021-07-27

## 2021-07-27 VITALS
BODY MASS INDEX: 27.39 KG/M2 | DIASTOLIC BLOOD PRESSURE: 62 MMHG | HEIGHT: 65 IN | WEIGHT: 164.4 LBS | SYSTOLIC BLOOD PRESSURE: 110 MMHG

## 2021-07-27 DIAGNOSIS — N63.0 BREAST MASS IN FEMALE: ICD-10-CM

## 2021-07-27 DIAGNOSIS — Z01.419 ENCOUNTER FOR GYNECOLOGICAL EXAMINATION WITHOUT ABNORMAL FINDING: Primary | ICD-10-CM

## 2021-07-27 DIAGNOSIS — Z11.51 SCREENING FOR HUMAN PAPILLOMAVIRUS: ICD-10-CM

## 2021-07-27 PROBLEM — R73.09 ELEVATED GLUCOSE LEVEL: Status: RESOLVED | Noted: 2020-11-20 | Resolved: 2021-07-27

## 2021-07-27 PROBLEM — Z34.80 SUPERVISION OF OTHER NORMAL PREGNANCY, ANTEPARTUM: Status: RESOLVED | Noted: 2019-11-22 | Resolved: 2021-07-27

## 2021-07-27 PROCEDURE — 3008F BODY MASS INDEX DOCD: CPT | Performed by: OBSTETRICS & GYNECOLOGY

## 2021-07-27 PROCEDURE — 99395 PREV VISIT EST AGE 18-39: CPT | Performed by: OBSTETRICS & GYNECOLOGY

## 2021-07-27 NOTE — PROGRESS NOTES
Routine Annual Visit    2021    Patient: Estephania Pathak          MR#:[id number]      Chief Complaint   Patient presents with   • Gynecologic Exam     AE Today, Last AE 2019 (-). Pt has no complaints.        History of Present Illness    25 y.o. female  who presents for annual exam.  She is doing well, she has 2 years in recovery now.  She brought her baby boy with her today, he is doing well also.  She has the Nexplanon, happy with it.  Amenorrheic.  She noticed that she has a red streak on her right breast.  She is still breast-feeding.  There is also a tender lump underneath.  No fevers or chills      No LMP recorded. Patient has had an implant.  Obstetric History:  OB History        2    Para   1    Term   1       0    AB   1    Living   1       SAB   1    TAB   0    Ectopic   0    Molar   0    Multiple   0    Live Births   1          Obstetric Comments   20 - 33-6 weeks - EFW 54%, AC 74% - [facility]              Menstrual History:     No LMP recorded. Patient has had an implant.       ________________________________________  Patient Active Problem List   Diagnosis   • Tobacco use   • Methadone use (CMS/HCC)   • External hemorrhoids   • High risk pregnancy due to maternal drug abuse in third trimester (CMS/HCC)   • Hepatitis C   • Drug use   • Current every day smoker   • Polysubstance abuse (CMS/HCC)   • Pregnancy   • Decreased fetal movements in third trimester   • Breast mass in female       Past Medical History:   Diagnosis Date   • Hepatitis-C     states not active, never had treatment.     • History of ADHD     AGE 6 TO 12,  TOOK PRESCRIPTION.   • History of vertebral fracture     L5. no surgery, brace and PT   • Hx of drug abuse (CMS/HCC)    • Kidney stone 2018   • Opiate addiction (CMS/HCC)        Family History   Problem Relation Age of Onset   • Endometriosis Mother    • Malig Hyperthermia Neg Hx    • Breast cancer Neg Hx    • Uterine cancer Neg Hx    • Ovarian cancer Neg  "Hx    • Colon cancer Neg Hx    • Congenital heart disease Neg Hx        Past Surgical History:   Procedure Laterality Date   • D & C WITH SUCTION N/A 3/15/2019    Procedure: DILATATION AND CURETTAGE WITH SUCTION;  Surgeon: Megan Campos MD;  Location: [facility];  Service: Obstetrics/Gynecology   • KIDNEY STONE SURGERY     • WISDOM TOOTH EXTRACTION         Social History     Tobacco Use   Smoking Status Current Every Day Smoker   • Packs/day: 0.25   • Years: 10.00   • Pack years: 2.50   • Types: Cigarettes   Smokeless Tobacco Never Used   Tobacco Comment    not  interested currently-trying to quit on own       has a current medication list which includes the following prescription(s): citalopram, docusate sodium, magnesium oxide, methadone hcl, prenatal vitamin 27-0.8, triamcinolone, dicloxacillin, fluticasone, ibuprofen, and mineral oil-hydrophilic petrolatum.  ________________________________________      Review of Systems   Constitutional: Negative for fever and unexpected weight change.   Respiratory: Negative for shortness of breath.    Cardiovascular: Negative for chest pain.   Gastrointestinal: Negative for abdominal pain, constipation and diarrhea.   Genitourinary: Negative for frequency and urgency.   Hematological: Negative for adenopathy.   Psychiatric/Behavioral: Negative for dysphoric mood.       Objective   Physical Exam    /62   Ht 165.1 cm (65\")   Wt 74.6 kg (164 lb 6.4 oz)   Breastfeeding Yes   BMI 27.36 kg/m²    BP Readings from Last 3 Encounters:   07/27/21 110/62   07/22/21 116/66   05/14/21 118/72      Wt Readings from Last 3 Encounters:   07/27/21 74.6 kg (164 lb 6.4 oz)   07/22/21 76.2 kg (168 lb)   05/14/21 77.6 kg (171 lb)         BMI: Body mass index is 27.36 kg/m².       General:   alert, appears stated age and cooperative   Neck: No thyromegaly or LAD, no carotid bruit noted   Heart:: regular rate and rhythm, S1, S2 normal, no murmur, click, rub or gallop   Lungs: " normal respiratory effort and auscultation   Abdomen: soft, non-tender, without masses or organomegaly   Breast: right breast with redness at 12 o clock, mass underlying approx 1-2 cm in size. mild TTP. otherwise normal breast exam, no other masses or axillary findings of bilateral breasts   Urethra and bladder: urethral meatus normal; bladder nontender to palpation;   Vulva: normal, Bartholin's, Urethra, Blairs's normal   Vagina: normal mucosa, normal discharge   Cervix: multiparous appearance and no lesions   Uterus: normal size and anteverted   Adnexa: normal adnexa and no mass, fullness, tenderness       Assessment:    normal annual exam   Possible mastitis  Possible breast abscess    Plan:    Plan     []  Mammogram request made  [x]  PAP done  []  Labs:   []  GC/Chl/TV  []  DEXA scan   []  Referral for colonoscopy:     Breast ultrasound ordered today and dicloxacillin sent to the pharmacy.  Given the redness and the breast lump, I am a bit worried about abscess and/or mastitis.  Plan follow-up in a week or so as well    Counseling  [x]  Nutrition  [x]  Physical activity/regular exercise   []  Healthy weight  []  Injury prevention  []  Smoking cessation  [x]  Substance misuse/abuse  [x]  Sexual behavior  [x]  STD prevention  [x]  Contraception  []  Dental health  [x]  Mental health  []  Immunization  [x]  Encouraged SBE        Olimpia Franco MD  07/27/2021  16:38 EDT

## 2021-07-29 LAB
CONV .: NORMAL
CYTOLOGIST CVX/VAG CYTO: NORMAL
CYTOLOGY CVX/VAG DOC CYTO: NORMAL
CYTOLOGY CVX/VAG DOC THIN PREP: NORMAL
DX ICD CODE: NORMAL
HIV 1 & 2 AB SER-IMP: NORMAL
OTHER STN SPEC: NORMAL
STAT OF ADQ CVX/VAG CYTO-IMP: NORMAL

## 2021-07-29 NOTE — PROGRESS NOTES
Please notify that her Pap was normal, there were no abnormal cells found.    Olimpia Franco MD  7/29/2021  14:59 EDT

## 2021-07-30 ENCOUNTER — TELEPHONE (OUTPATIENT)
Dept: OBSTETRICS AND GYNECOLOGY | Age: 26
End: 2021-07-30

## 2021-08-03 ENCOUNTER — APPOINTMENT (OUTPATIENT)
Dept: WOMENS IMAGING | Facility: HOSPITAL | Age: 26
End: 2021-08-03

## 2021-08-03 PROCEDURE — 76641 ULTRASOUND BREAST COMPLETE: CPT | Performed by: RADIOLOGY

## 2021-08-06 ENCOUNTER — PROCEDURE VISIT (OUTPATIENT)
Dept: SURGERY | Facility: CLINIC | Age: 26
End: 2021-08-06

## 2021-08-06 VITALS
RESPIRATION RATE: 16 BRPM | BODY MASS INDEX: 27.32 KG/M2 | HEIGHT: 65 IN | SYSTOLIC BLOOD PRESSURE: 122 MMHG | WEIGHT: 164 LBS | DIASTOLIC BLOOD PRESSURE: 74 MMHG | HEART RATE: 72 BPM

## 2021-08-06 DIAGNOSIS — Z72.0 TOBACCO USE: ICD-10-CM

## 2021-08-06 DIAGNOSIS — R22.9 MULTIPLE SKIN NODULES: Primary | ICD-10-CM

## 2021-08-06 PROCEDURE — 99212 OFFICE O/P EST SF 10 MIN: CPT | Performed by: SURGERY

## 2021-08-06 NOTE — PROGRESS NOTES
Estephania Pathak 25 y.o. female presents for excision of skin lesions X2 RIGHT thigh and LEFT forearm.   Chief Complaint   Patient presents with   • Skin Lesion     RIGHT thigh, LEFT arm             HPI     Above noted and agree.  We did receive a PA to remove the lesions but Estephania is breast feeding and has concerns about having the lesions removed.    Review of Systems          Current Outpatient Medications:   •  citalopram (CeleXA) 40 MG tablet, Take 1 tablet by mouth Daily., Disp: 90 tablet, Rfl: 1  •  docusate sodium (COLACE) 50 MG capsule, Take  by mouth 2 (Two) Times a Day., Disp: , Rfl:   •  fluticasone (Flonase) 50 MCG/ACT nasal spray, 2 sprays into the nostril(s) as directed by provider Daily., Disp: 16 g, Rfl: 5  •  ibuprofen (ADVIL,MOTRIN) 600 MG tablet, Take 1 tablet by mouth Every 6 (Six) Hours., Disp: 30 tablet, Rfl: 0  •  magnesium oxide (MAG-OX) 400 MG tablet, Take 1 tablet by mouth Daily., Disp: 90 tablet, Rfl: 3  •  METHADONE HCL PO, Take 113 mg by mouth Daily., Disp: , Rfl:   •  mineral oil-hydrophilic petrolatum (AQUAPHOR) ointment, Apply  topically to the appropriate area as directed As Needed for Dry Skin., Disp: 396 g, Rfl: 0  •  Prenatal Vit-Fe Fumarate-FA (prenatal vitamin 27-0.8) 27-0.8 MG tablet tablet, Take 1 tablet by mouth Daily., Disp: 90 tablet, Rfl: 3  •  triamcinolone (KENALOG) 0.1 % cream, Apply  topically to the appropriate area as directed 2 (Two) Times a Day., Disp: 80 g, Rfl: 1        No Known Allergies        Past Medical History:   Diagnosis Date   • Hepatitis-C     states not active, never had treatment.     • History of ADHD     AGE 6 TO 12,  TOOK PRESCRIPTION.   • History of vertebral fracture     L5. no surgery, brace and PT   • Hx of drug abuse (CMS/HCC)    • Kidney stone 12/25/2018   • Opiate addiction (CMS/HCC)            Past Surgical History:   Procedure Laterality Date   • D & C WITH SUCTION N/A 3/15/2019    Procedure: DILATATION AND CURETTAGE WITH SUCTION;  Surgeon:  "Megan aCmpos MD;  Location: [facility] OR;  Service: Obstetrics/Gynecology   • KIDNEY STONE SURGERY     • WISDOM TOOTH EXTRACTION             Social History     Tobacco Use   • Smoking status: Current Every Day Smoker     Packs/day: 0.25     Years: 10.00     Pack years: 2.50     Types: Cigarettes   • Smokeless tobacco: Never Used   • Tobacco comment: not  interested currently-trying to quit on own   Substance Use Topics   • Alcohol use: No   • Drug use: Not Currently     Types: Marijuana, Hydrocodone, Cocaine(coke), Fentanyl, Heroin     Comment: heroin daily, pain pills-occasionally, a month for marijuana           Immunization History   Administered Date(s) Administered   • Flu Vaccine Quad PF >18YRS 09/08/2019, 09/30/2019   • Flulaval/Fluarix/Fluzone Quad 11/19/2020   • Hep A / Hep B 03/07/2018, 03/07/2018, 04/16/2018, 04/16/2018   • Influenza Quad Vaccine (Inpatient) 09/30/2019, 09/30/2019   • Influenza, Unspecified 09/08/2019   • PPD Test 01/13/2020, 01/13/2020, 02/07/2020, 02/07/2020   • Td 10/28/2005, 10/28/2005   • Tdap 02/28/2020           Physical Exam     The lesions have not changed since her last appointment.    Debilities/Disabilities Identified: None    Emotional Behavior: Appropriate      /74   Pulse 72   Resp 16   Ht 165.1 cm (65\")   Wt 74.4 kg (164 lb)   BMI 27.29 kg/m²         Diagnoses and all orders for this visit:    1. Multiple skin nodules (Primary)    2. Breast feeding status of mother    3. Tobacco use    We discussed that it would be OK to wait for her to no longer be breast feeding before we excised her lesions.  It would be best if she stopped smoking.  She may call anytime as needed.    Thank you for allowing me to participate in the care of this interesting patient.        "

## 2021-08-16 ENCOUNTER — APPOINTMENT (OUTPATIENT)
Dept: ULTRASOUND IMAGING | Facility: HOSPITAL | Age: 26
End: 2021-08-16

## 2021-08-18 ENCOUNTER — TELEPHONE (OUTPATIENT)
Dept: FAMILY MEDICINE CLINIC | Facility: CLINIC | Age: 26
End: 2021-08-18

## 2021-08-18 RX ORDER — PRENATAL VIT/IRON FUM/FOLIC AC 27MG-0.8MG
1 TABLET ORAL DAILY
Qty: 90 TABLET | Refills: 3 | Status: SHIPPED | OUTPATIENT
Start: 2021-08-18 | End: 2022-06-15

## 2021-08-18 NOTE — TELEPHONE ENCOUNTER
Caller: Estephania Pathak    Relationship: Self    Best call back number:[phone]    Medication needed:   Prenatal Vit-Fe Fumarate-FA (prenatal vitamin 27-0.8) 27-0.8 MG tablet tablet    When do you need the refill by: ASAP    What additional details did the patient provide when requesting the medication: PATIENT IS ALMOST OUT    Does the patient have less than a 3 day supply:  [x] Yes  [] No    What is the patient's preferred pharmacy: [facility] - [place] [address] - [phone] [facility] [phone]

## 2021-08-18 NOTE — TELEPHONE ENCOUNTER
This medication is not for her to lose weight.   If she is having trouble with orgasm we can discuss change of medication and then follow up for her mood if she would like. Patient should make telehealth or in person visit with me

## 2021-08-18 NOTE — TELEPHONE ENCOUNTER
Caller: Estephania Pathak    Relationship: Self    Best call back number: [phone]    What medications are you currently taking:   Current Outpatient Medications on File Prior to Visit   Medication Sig Dispense Refill   • citalopram (CeleXA) 40 MG tablet Take 1 tablet by mouth Daily. 90 tablet 1   • docusate sodium (COLACE) 50 MG capsule Take  by mouth 2 (Two) Times a Day.     • fluticasone (Flonase) 50 MCG/ACT nasal spray 2 sprays into the nostril(s) as directed by provider Daily. 16 g 5   • ibuprofen (ADVIL,MOTRIN) 600 MG tablet Take 1 tablet by mouth Every 6 (Six) Hours. 30 tablet 0   • magnesium oxide (MAG-OX) 400 MG tablet Take 1 tablet by mouth Daily. 90 tablet 3   • METHADONE HCL PO Take 113 mg by mouth Daily.     • mineral oil-hydrophilic petrolatum (AQUAPHOR) ointment Apply  topically to the appropriate area as directed As Needed for Dry Skin. 396 g 0   • Prenatal Vit-Fe Fumarate-FA (prenatal vitamin 27-0.8) 27-0.8 MG tablet tablet Take 1 tablet by mouth Daily. 90 tablet 3   • triamcinolone (KENALOG) 0.1 % cream Apply  topically to the appropriate area as directed 2 (Two) Times a Day. 80 g 1     No current facility-administered medications on file prior to visit.          When did you start taking these medications: 7/22/21    Which medication are you concerned about: citalopram (CeleXA) 40 MG tablet    Who prescribed you this medication: MIHAELA BERKOWITZ    What are your concerns: PATIENT STATES THAT IT'S HARD FOR HER TO HAVE AN ORGASM, PLUS THIS IS TO HELP HER LOSE WEIGHT AND SHE IS STILL THE SAME WEIGHT.     How long have you had these concerns: SINCE TAKING THIS MEDICATION.  PLEASE ADVISE.

## 2021-08-18 NOTE — TELEPHONE ENCOUNTER
CALLED PT SHE SAID SHE DID NOT SAY IT WAS TO LOSE WEIGHT, SHE SAID SHE CANT SEEM TO LOSE WEIGHT AND THINKS THE MED MIGHT BE WHY  YUMIKO DOXY FOR TOMORROW

## 2021-08-19 ENCOUNTER — TELEMEDICINE (OUTPATIENT)
Dept: FAMILY MEDICINE CLINIC | Facility: CLINIC | Age: 26
End: 2021-08-19

## 2021-08-19 DIAGNOSIS — F33.0 MAJOR DEPRESSIVE DISORDER, RECURRENT, MILD (HCC): Primary | ICD-10-CM

## 2021-08-19 DIAGNOSIS — F41.9 ANXIETY: ICD-10-CM

## 2021-08-19 PROCEDURE — 99213 OFFICE O/P EST LOW 20 MIN: CPT | Performed by: NURSE PRACTITIONER

## 2021-08-19 NOTE — PROGRESS NOTES
You have chosen to receive care through a telehealth visit. Do you consent to use a telehealth visit for your medical care today? Yes      Subjective   Estephania Pathak is a 26 y.o. female.     Chief Complaint   Patient presents with   • Anxiety     wants to change depression meds    • Med Refill        History of Present Illness     Patient presents today via tele-health video visit to discuss change of anxiety medication.     She is concerned that the Citalopram has made it hard for her to achieve orgasm, as well as having issues losing weight and wanted to try something else.   She has trialed effexor in the past.  She is wanting to get the gene sight testing   today    The following portions of the patient's history were reviewed and updated as appropriate: allergies, current medications, past family history, past medical history, past social history, past surgical history and problem list.    Past Medical History:   Diagnosis Date   • Hepatitis-C     states not active, never had treatment.     • History of ADHD     AGE 6 TO 12,  TOOK PRESCRIPTION.   • History of vertebral fracture     L5. no surgery, brace and PT   • Hx of drug abuse (CMS/[facility])    • Kidney stone 12/25/2018   • Opiate addiction (CMS/[facility])        Past Surgical History:   Procedure Laterality Date   • D & C WITH SUCTION N/A 3/15/2019    Procedure: DILATATION AND CURETTAGE WITH SUCTION;  Surgeon: Megan Campos MD;  Location: [facility];  Service: Obstetrics/Gynecology   • KIDNEY STONE SURGERY     • WISDOM TOOTH EXTRACTION         Family History   Problem Relation Age of Onset   • Endometriosis Mother    • Malig Hyperthermia Neg Hx    • Breast cancer Neg Hx    • Uterine cancer Neg Hx    • Ovarian cancer Neg Hx    • Colon cancer Neg Hx    • Congenital heart disease Neg Hx        Social History     Socioeconomic History   • Marital status: Single     Spouse name: Not on file   • Number of children: 0   • Years of education: Not on file   • Highest  education level: Not on file   Tobacco Use   • Smoking status: Current Every Day Smoker     Packs/day: 0.25     Years: 10.00     Pack years: 2.50     Types: Cigarettes   • Smokeless tobacco: Never Used   • Tobacco comment: not  interested currently-trying to quit on own   Substance and Sexual Activity   • Alcohol use: No   • Drug use: Not Currently     Types: Marijuana, Hydrocodone, Cocaine(coke), Fentanyl, Heroin     Comment: heroin daily, pain pills-occasionally, a month for marijuana   • Sexual activity: Yes     Partners: Male     Birth control/protection: Implant     Comment: implanon         Current Outpatient Medications:   •  citalopram (CeleXA) 40 MG tablet, Take 1 tablet by mouth Daily., Disp: 90 tablet, Rfl: 1  •  docusate sodium (COLACE) 50 MG capsule, Take  by mouth 2 (Two) Times a Day., Disp: , Rfl:   •  magnesium oxide (MAG-OX) 400 MG tablet, Take 1 tablet by mouth Daily., Disp: 90 tablet, Rfl: 3  •  METHADONE HCL PO, Take 113 mg by mouth Daily., Disp: , Rfl:   •  Prenatal Vit-Fe Fumarate-FA (prenatal vitamin 27-0.8) 27-0.8 MG tablet tablet, Take 1 tablet by mouth Daily., Disp: 90 tablet, Rfl: 3  •  triamcinolone (KENALOG) 0.1 % cream, Apply  topically to the appropriate area as directed 2 (Two) Times a Day., Disp: 80 g, Rfl: 1  •  fluticasone (Flonase) 50 MCG/ACT nasal spray, 2 sprays into the nostril(s) as directed by provider Daily., Disp: 16 g, Rfl: 5  •  ibuprofen (ADVIL,MOTRIN) 600 MG tablet, Take 1 tablet by mouth Every 6 (Six) Hours., Disp: 30 tablet, Rfl: 0  •  mineral oil-hydrophilic petrolatum (AQUAPHOR) ointment, Apply  topically to the appropriate area as directed As Needed for Dry Skin., Disp: 396 g, Rfl: 0    Review of Systems    Objective   There were no vitals filed for this visit.  There is no height or weight on file to calculate BMI.  Physical Exam  Constitutional:       Appearance: Normal appearance.   Neurological:      Mental Status: She is alert and oriented to person, place,  and time.   Psychiatric:         Mood and Affect: Mood normal.         Behavior: Behavior normal.         Thought Content: Thought content normal.         Judgment: Judgment normal.           Assessment/Plan   Diagnoses and all orders for this visit:    1. Major depressive disorder, recurrent, mild (CMS/HCC) (Primary)  -     GeneSight - Swab,; Future    2. Anxiety  -     GeneSight - Swab,; Future        Patient would like to wait on change of medicine until we can order GeneSight.  She is going to come in tomorrow for GeneSight testing I will call with results and changes needed plan of care.    Time spent on visit was 10 minutes         There are no Patient Instructions on file for this visit.

## 2021-08-20 ENCOUNTER — CLINICAL SUPPORT (OUTPATIENT)
Dept: FAMILY MEDICINE CLINIC | Facility: CLINIC | Age: 26
End: 2021-08-20

## 2021-08-20 VITALS — TEMPERATURE: 97.3 F

## 2021-08-20 DIAGNOSIS — F33.0 MAJOR DEPRESSIVE DISORDER, RECURRENT, MILD (HCC): Primary | ICD-10-CM

## 2021-08-20 DIAGNOSIS — F41.9 ANXIETY: ICD-10-CM

## 2021-08-24 ENCOUNTER — OFFICE VISIT (OUTPATIENT)
Dept: OBSTETRICS AND GYNECOLOGY | Age: 26
End: 2021-08-24

## 2021-08-24 ENCOUNTER — TELEPHONE (OUTPATIENT)
Dept: OBSTETRICS AND GYNECOLOGY | Age: 26
End: 2021-08-24

## 2021-08-24 VITALS
SYSTOLIC BLOOD PRESSURE: 124 MMHG | WEIGHT: 166.4 LBS | BODY MASS INDEX: 27.72 KG/M2 | HEIGHT: 65 IN | DIASTOLIC BLOOD PRESSURE: 70 MMHG

## 2021-08-24 DIAGNOSIS — N61.0 MASTITIS: Primary | ICD-10-CM

## 2021-08-24 PROBLEM — O99.323 HIGH RISK PREGNANCY DUE TO MATERNAL DRUG ABUSE IN THIRD TRIMESTER: Status: RESOLVED | Noted: 2020-02-07 | Resolved: 2021-08-24

## 2021-08-24 PROBLEM — F19.10 HIGH RISK PREGNANCY DUE TO MATERNAL DRUG ABUSE IN THIRD TRIMESTER: Status: RESOLVED | Noted: 2020-02-07 | Resolved: 2021-08-24

## 2021-08-24 PROBLEM — Z34.90 PREGNANCY: Status: RESOLVED | Noted: 2020-05-19 | Resolved: 2021-08-24

## 2021-08-24 PROBLEM — O36.8130 DECREASED FETAL MOVEMENTS IN THIRD TRIMESTER: Status: RESOLVED | Noted: 2020-05-19 | Resolved: 2021-08-24

## 2021-08-24 PROBLEM — N63.0 BREAST MASS IN FEMALE: Status: RESOLVED | Noted: 2021-07-27 | Resolved: 2021-08-24

## 2021-08-24 PROCEDURE — 99212 OFFICE O/P EST SF 10 MIN: CPT | Performed by: OBSTETRICS & GYNECOLOGY

## 2021-08-24 RX ORDER — DOCUSATE SODIUM 100 MG/1
100 CAPSULE, LIQUID FILLED ORAL 2 TIMES DAILY
COMMUNITY
Start: 2021-08-21 | End: 2022-09-12

## 2021-08-24 NOTE — PROGRESS NOTES
"Chief Complaint   Patient presents with   • Gynecologic Exam     Follow up lump under breast. Pt states lump has gone away.      Estephania Pathak presents to follow up on breast issue. She had likely mastitis and the issue completely resolved. Just notes a tiny bruise above the right breast.  She is trying to get her son to stop breast-feeding, seems to use the breast for comfort.    /70   Ht 165.1 cm (65\")   Wt 75.5 kg (166 lb 6.4 oz)   Breastfeeding Yes   BMI 27.69 kg/m²   Appears well and in no distress  Male, nonlabored breathing  Normal-appearing right breast, tiny hemangioma just above the right nipple.  No underlying mass or erythema    A/P  Resolved mastitis    Reviewed ultrasound report with her, no abnormalities noted an issue is now resolved.  Gave information for lactation consultant to discuss behavior management to help her stop breast-feeding.  Also discussed with pediatrician today    FU for annual exam    Olimpia Franco MD  8/24/2021  12:28 EDT    "

## 2021-08-24 NOTE — TELEPHONE ENCOUNTER
Pt has appt at 11:00 and is in standstill traffic, was informed would need to be here within 15 mins or would need to r/s pt jean RIVERA to call her.[email or web address]

## 2021-08-27 ENCOUNTER — TELEPHONE (OUTPATIENT)
Dept: FAMILY MEDICINE CLINIC | Facility: CLINIC | Age: 26
End: 2021-08-27

## 2021-08-27 NOTE — TELEPHONE ENCOUNTER
Please call patient with results of gene site test.  Please let her know that citalopram which she is currently on is actually on her moderate gene to drug interaction.  Effexor that she is tried in the past is actually on her significant gene to drug interaction.  On her use as directed Pristiq, Zoloft, Viibryd, Fetzima, and Emsam are all on her list.  The last 2 are not ones that I routinely prescribe.  BuSpar specifically for anxiety is a noncontrolled option that is on her use as directed list.  Mood stabilizers that we could consider are Vraylar, Latuda, Geodon, Tegretol, Lamictal, and Trileptal.  Please let her know if she wishes to try one of the ones on her use as directed list just let me know which one she wants to trial.  If she wants to discuss these results further for possible change of therapy she should make an appointment with me.  Would like her to follow-up in 4 weeks if she wants to try new medicine

## 2021-08-30 DIAGNOSIS — F41.9 ANXIETY: ICD-10-CM

## 2021-08-30 DIAGNOSIS — F33.0 MAJOR DEPRESSIVE DISORDER, RECURRENT, MILD (HCC): ICD-10-CM

## 2021-09-15 ENCOUNTER — TELEMEDICINE (OUTPATIENT)
Dept: FAMILY MEDICINE CLINIC | Facility: CLINIC | Age: 26
End: 2021-09-15

## 2021-09-15 ENCOUNTER — TELEPHONE (OUTPATIENT)
Dept: FAMILY MEDICINE CLINIC | Facility: CLINIC | Age: 26
End: 2021-09-15

## 2021-09-15 DIAGNOSIS — F33.0 MAJOR DEPRESSIVE DISORDER, RECURRENT, MILD (HCC): Primary | ICD-10-CM

## 2021-09-15 DIAGNOSIS — F41.9 ANXIETY: ICD-10-CM

## 2021-09-15 PROCEDURE — 99213 OFFICE O/P EST LOW 20 MIN: CPT | Performed by: NURSE PRACTITIONER

## 2021-09-15 RX ORDER — VILAZODONE HYDROCHLORIDE 20 MG/1
20 TABLET ORAL DAILY
Qty: 30 TABLET | Refills: 2 | Status: SHIPPED | OUTPATIENT
Start: 2021-09-15 | End: 2021-12-06

## 2021-09-15 NOTE — TELEPHONE ENCOUNTER
Caller: Parish Pathak    Relationship: Self    Best call back number: [phone] (M)    Caller requesting test results: PARISH PATHAK    What test was performed: GENETIC MEDICATION TESTING BY MOUTH SWB?    When was the test performed: 3 OR 4 WEEKS AGO    Where was the test performed: IN OFFICE    Additional notes: PATIENT WOULD LIKE THE LIST OF MEDICATIONS THAT WILL WORK FOR HER THAT THE TESTING SHOWS, PLEASE ADVISE ASAP

## 2021-09-15 NOTE — PROGRESS NOTES
Chief Complaint  Follow-up (on genesight testing)    Subjective          Estephania Pathak presents to [facility]  History of Present Illness     Patient is here today to discuss genesight testing results.    Would like to start a medication that does not decrease her orgasms, does not make her gain weight, and helps with her mood and dysthmia     Objective   Vital Signs:   There were no vitals taken for this visit.    Physical Exam  Constitutional:       Appearance: Normal appearance.   Neurological:      Mental Status: She is alert and oriented to person, place, and time.   Psychiatric:         Mood and Affect: Mood normal.         Thought Content: Thought content normal.         Judgment: Judgment normal.        Result Review :                 Assessment and Plan    Diagnoses and all orders for this visit:    1. Major depressive disorder, recurrent, mild (CMS/HCC) (Primary)    2. Anxiety    Other orders  -     vilazodone (Viibryd) 20 MG tablet tablet; Take 1 tablet by mouth Daily.  Dispense: 30 tablet; Refill: 2      We went over options and decided to trial Viibryd.  Patient is going to wean down off of citalopram and start titrating up Viibryd.  We will follow-up in 4 weeks.  She has any worsening mood or side effects she should notify provider.  If any suicidal or homicidal ideations go to the ER.  She verbalizes understanding.    Time spent on visit was 19 minutes.      Follow Up   No follow-ups on file.  Patient was given instructions and counseling regarding her condition or for health maintenance advice. Please see specific information pulled into the AVS if appropriate.

## 2021-09-21 ENCOUNTER — TELEPHONE (OUTPATIENT)
Dept: FAMILY MEDICINE CLINIC | Facility: CLINIC | Age: 26
End: 2021-09-21

## 2021-09-21 NOTE — TELEPHONE ENCOUNTER
Pt called and left a voicemail and is needing information on how to switch her medication from citalopram to vyvriid.  She stated that today was her first day of taking half 20mg of citalopram.  She wants to know when to start the vybriid and should she start at 10 mg and work her way up to 20 mg.  Please advise    Pt aware you are out of the office today and she will get a return call tomorrow

## 2021-09-22 NOTE — TELEPHONE ENCOUNTER
As discussed in office visit, she should start the 10mg dosing with the decrease of citalopram, and then stop citalopram with increase viibryd

## 2021-09-24 ENCOUNTER — OFFICE VISIT (OUTPATIENT)
Dept: FAMILY MEDICINE CLINIC | Facility: CLINIC | Age: 26
End: 2021-09-24

## 2021-09-24 VITALS
HEIGHT: 65 IN | OXYGEN SATURATION: 100 % | WEIGHT: 167.5 LBS | DIASTOLIC BLOOD PRESSURE: 70 MMHG | HEART RATE: 100 BPM | BODY MASS INDEX: 27.91 KG/M2 | TEMPERATURE: 97.5 F | SYSTOLIC BLOOD PRESSURE: 118 MMHG

## 2021-09-24 DIAGNOSIS — M25.532 LEFT WRIST PAIN: ICD-10-CM

## 2021-09-24 DIAGNOSIS — L84 SKIN CALLUS: ICD-10-CM

## 2021-09-24 DIAGNOSIS — K64.9 HEMORRHOIDS, UNSPECIFIED HEMORRHOID TYPE: Primary | ICD-10-CM

## 2021-09-24 PROCEDURE — 99214 OFFICE O/P EST MOD 30 MIN: CPT | Performed by: NURSE PRACTITIONER

## 2021-09-24 NOTE — PROGRESS NOTES
"Chief Complaint  hemorroids and cut on big toe    Subjective          Estephania Pathak presents to [facility]  History of Present Illness     Patient is here today with several new complaints to me.     Hemorrhoids-has had several times issues with them since being pregnant.    Cream and pads really helped.    Only hurts when she wipes. Doesn't hurt when she has BM      Cut big toe months ago.  States it isn't hurting.  Skin just feels different.       Objective   Vital Signs:   /70   Pulse 100   Temp 97.5 °F (36.4 °C)   Ht 165.1 cm (65\")   Wt 76 kg (167 lb 8 oz)   SpO2 100%   BMI 27.87 kg/m²     Physical Exam  Constitutional:       Appearance: Normal appearance.   Feet:      Left foot:      Skin integrity: Callus (on posteriod side of great toe) present.   Skin:     General: Skin is warm and dry.   Neurological:      Mental Status: She is alert and oriented to person, place, and time.   Psychiatric:         Mood and Affect: Mood normal.         Behavior: Behavior normal.         Thought Content: Thought content normal.         Judgment: Judgment normal.        Result Review :                 Assessment and Plan    Diagnoses and all orders for this visit:    1. Hemorrhoids, unspecified hemorrhoid type (Primary)  -     Ambulatory Referral to Colorectal Surgery    2. Left wrist pain    Other orders  -     Phenylephrine-Witch Hazel 0.25-50 % gel; Apply 1 Units topically 2 (Two) Times a Day As Needed (hemorrhoid).  Dispense: 51 g; Refill: 2  -     Elastic Bandages & Supports (Wrist Support/Elastic Small) misc; 1 each Daily As Needed (wrist pain).  Dispense: 1 each; Refill: 0  -     mineral oil-hydrophilic petrolatum (AQUAPHOR) ointment; Apply  topically to the appropriate area as directed As Needed for Dry Skin.  Dispense: 396 g; Refill: 0      Will reorder cream for hemorrhoids.  Also continue to use tucks pads.  Discussed using miralax for constipation to help prevent recurrence.  " She would really like to see specialty for another opinion.  Referral made.     Skin callus-recommended use of aquaphor.      Wrist pain- seeing chiropractor.  Recommended wrist support.        Follow Up   No follow-ups on file.  Patient was given instructions and counseling regarding her condition or for health maintenance advice. Please see specific information pulled into the AVS if appropriate.

## 2021-12-06 ENCOUNTER — TELEPHONE (OUTPATIENT)
Dept: FAMILY MEDICINE CLINIC | Facility: CLINIC | Age: 26
End: 2021-12-06

## 2021-12-06 RX ORDER — VILAZODONE HYDROCHLORIDE 20 MG/1
TABLET ORAL
Qty: 30 TABLET | Refills: 2 | Status: SHIPPED | OUTPATIENT
Start: 2021-12-06 | End: 2022-02-28

## 2021-12-07 RX ORDER — MAGNESIUM OXIDE 400 MG/1
400 TABLET ORAL DAILY
Qty: 90 TABLET | Refills: 3 | Status: SHIPPED | OUTPATIENT
Start: 2021-12-07 | End: 2022-10-07

## 2021-12-08 NOTE — TELEPHONE ENCOUNTER
If she voices any suicidal or homicidal ideation or hopelessness I would refer her to emergency psychiatric care.  Otherwise, have her see Luis to discuss this next week instead of waiting till the end of January.  It looks like she did not just start the Viibryd.

## 2021-12-08 NOTE — TELEPHONE ENCOUNTER
SPOKE TO PATIENT SHE IS NOT HAVING IDEATIONS BUT SHE SOUNDS DESPERATE TRANSFERRED TO FRONT TO BE SCHEDULED ON MONDAY

## 2021-12-08 NOTE — TELEPHONE ENCOUNTER
PATIENT IS CALLING IN SHE SAYS SHE IS HAVING SOME CONCERNS ABOUT HER VIIBRYD SHE STATES THAT SHE HAS BEEN VERY EMOTIONAL SINCE TAKING THIS MEDICATION AND SHE IS NOT SURE IF IT IS THE MIX OF HER METHADONE AND WHAT IS GOING ON.      SHE MADE APPT FOR FIRST AVAILABLE ON 12/29/21 BUT SHE IS CONCERNED ABOUT HOW SHE IS FEELING SHE IS NOT HERSELF.      PLEASE ADVISE    CALLBACK NUMBER IS  [phone]

## 2021-12-13 ENCOUNTER — OFFICE VISIT (OUTPATIENT)
Dept: FAMILY MEDICINE CLINIC | Facility: CLINIC | Age: 26
End: 2021-12-13

## 2021-12-13 VITALS
SYSTOLIC BLOOD PRESSURE: 122 MMHG | BODY MASS INDEX: 29.12 KG/M2 | TEMPERATURE: 98.6 F | HEIGHT: 65 IN | DIASTOLIC BLOOD PRESSURE: 74 MMHG | WEIGHT: 174.8 LBS | HEART RATE: 93 BPM | OXYGEN SATURATION: 94 %

## 2021-12-13 DIAGNOSIS — M79.671 PAIN IN BOTH FEET: Primary | ICD-10-CM

## 2021-12-13 DIAGNOSIS — M79.672 PAIN IN BOTH FEET: Primary | ICD-10-CM

## 2021-12-13 DIAGNOSIS — F33.0 MAJOR DEPRESSIVE DISORDER, RECURRENT, MILD (HCC): ICD-10-CM

## 2021-12-13 DIAGNOSIS — F41.9 ANXIETY: ICD-10-CM

## 2021-12-13 PROCEDURE — 99214 OFFICE O/P EST MOD 30 MIN: CPT | Performed by: NURSE PRACTITIONER

## 2021-12-13 NOTE — PROGRESS NOTES
"Chief Complaint  Irritable (emotional)    Subjective          Estephania Pathak presents to [facility]  History of Present Illness    Patient presents today with complaint of increased irritability and emotion lately.    Emotional-unsure if is related to methadone and viibryd.  Has been worse for last month  93 mg daily.  Had been cutting back 2mg weekly.   Has been on viibryd for about 3 months.  Not sure if it is helping with mood  Emotional has gotten better.   That day she was crying without knowing she had forgotten to take all medication all at once.       We discussed meds.    Referred to psychiatrist.   Seeing intensive counseling, ends this week.        Twisting in both feet.  Pain in both legs  Reports last weekend had twisting in both ankles and he feel.  Still having swelling in her left ankle and pain in right toe. Went to chiropractor.   States pain has improved.   Had adjustment in feet.  No x-ray.      Breast feeding-wants to stop  Son uses her as     Objective   Vital Signs:   /74   Pulse 93   Temp 98.6 °F (37 °C)   Ht 165.1 cm (65\")   Wt 79.3 kg (174 lb 12.8 oz)   SpO2 94%   BMI 29.09 kg/m²     Physical Exam  Constitutional:       Appearance: Normal appearance.   Cardiovascular:      Rate and Rhythm: Normal rate and regular rhythm.      Pulses: Normal pulses.   Pulmonary:      Effort: Pulmonary effort is normal.      Breath sounds: Normal breath sounds.   Skin:     General: Skin is warm and dry.   Neurological:      Mental Status: She is alert.   Psychiatric:         Mood and Affect: Mood normal.         Behavior: Behavior normal.         Thought Content: Thought content normal.         Judgment: Judgment normal.        Result Review :                 Assessment and Plan    Diagnoses and all orders for this visit:    1. Pain in both feet (Primary)  -     Ambulatory Referral to Podiatry    2. Major depressive disorder, recurrent, mild (HCC)  -     Ambulatory " Referral to Psychiatry    3. Anxiety  -     Ambulatory Referral to Psychiatry    Other orders  -     Cariprazine HCl (Vraylar) 1.5 MG capsule capsule; Take 1 capsule by mouth Daily.  Dispense: 30 capsule; Refill: 2      Referral made to podiatry for pain in feet and ankles.    Anxiety and depression.  We discussed options for treatment.  Patient would like to try a mood stabilizer.  We will add on frailer thyroid.  Like her to follow-up in 4 weeks for medication management.  Referral also made for psychiatry for further evaluation.    I discussed weaning protocol for breast-feeding.    Time spent on visit was 30 minutes      Follow Up   No follow-ups on file.  Patient was given instructions and counseling regarding her condition or for health maintenance advice. Please see specific information pulled into the AVS if appropriate.

## 2022-01-06 ENCOUNTER — OFFICE VISIT (OUTPATIENT)
Dept: FAMILY MEDICINE CLINIC | Facility: CLINIC | Age: 27
End: 2022-01-06

## 2022-01-06 VITALS
TEMPERATURE: 96.6 F | DIASTOLIC BLOOD PRESSURE: 76 MMHG | HEIGHT: 65 IN | WEIGHT: 185.4 LBS | SYSTOLIC BLOOD PRESSURE: 124 MMHG | BODY MASS INDEX: 30.89 KG/M2 | OXYGEN SATURATION: 98 % | HEART RATE: 78 BPM

## 2022-01-06 DIAGNOSIS — J01.00 ACUTE NON-RECURRENT MAXILLARY SINUSITIS: ICD-10-CM

## 2022-01-06 DIAGNOSIS — R05.9 COUGH: Primary | ICD-10-CM

## 2022-01-06 LAB
EXPIRATION DATE: NORMAL
FLUAV AG NPH QL: NEGATIVE
FLUBV AG NPH QL: NEGATIVE
INTERNAL CONTROL: NORMAL
Lab: NORMAL

## 2022-01-06 PROCEDURE — 99213 OFFICE O/P EST LOW 20 MIN: CPT | Performed by: FAMILY MEDICINE

## 2022-01-06 PROCEDURE — 87804 INFLUENZA ASSAY W/OPTIC: CPT | Performed by: FAMILY MEDICINE

## 2022-01-06 RX ORDER — IPRATROPIUM BROMIDE 42 UG/1
2 SPRAY, METERED NASAL 4 TIMES DAILY
Qty: 15 ML | Refills: 0 | Status: SHIPPED | OUTPATIENT
Start: 2022-01-06 | End: 2022-09-12

## 2022-01-06 RX ORDER — DEXTROMETHORPHAN HYDROBROMIDE AND PROMETHAZINE HYDROCHLORIDE 15; 6.25 MG/5ML; MG/5ML
5 SYRUP ORAL 4 TIMES DAILY PRN
Qty: 180 ML | Refills: 0 | Status: SHIPPED | OUTPATIENT
Start: 2022-01-06 | End: 2022-01-24

## 2022-01-06 RX ORDER — BENZONATATE 200 MG/1
200 CAPSULE ORAL 3 TIMES DAILY PRN
Qty: 30 CAPSULE | Refills: 0 | Status: SHIPPED | OUTPATIENT
Start: 2022-01-06 | End: 2022-02-21

## 2022-01-06 RX ORDER — AMOXICILLIN 500 MG/1
1000 CAPSULE ORAL 2 TIMES DAILY
Qty: 28 CAPSULE | Refills: 0 | Status: SHIPPED | OUTPATIENT
Start: 2022-01-06 | End: 2022-01-13

## 2022-01-06 NOTE — PROGRESS NOTES
"Chief Complaint  Cough    Subjective          Estephania Pathak presents to [facility]  Patient is new to me today as she is a patient of JACK Cosme.  She presents with acute complaint today.  Has been coughing for a week now.  Runny nose started a few days ago.  Snot is all clear.  Can get green at times.  Cough can be productive.  No fever.  No chest pain or SOA.  Son with cough 2 days ago.   He tested negative for Covid.  Has had some trouble sleeping from cough.  She is currently breast-feeding her toddler.      Objective   Vital Signs:   /76   Pulse 78   Temp 96.6 °F (35.9 °C)   Ht 165.1 cm (65\")   Wt 84.1 kg (185 lb 6.4 oz)   SpO2 98%   BMI 30.85 kg/m²     Physical Exam  Constitutional:       General: She is not in acute distress.     Appearance: Normal appearance. She is well-developed.   HENT:      Head: Normocephalic and atraumatic.      Right Ear: Tympanic membrane, ear canal and external ear normal.      Left Ear: Tympanic membrane, ear canal and external ear normal.      Nose:      Right Sinus: Maxillary sinus tenderness present.      Left Sinus: Maxillary sinus tenderness present.      Mouth/Throat:      Mouth: Mucous membranes are moist.      Pharynx: Oropharynx is clear.   Eyes:      Conjunctiva/sclera: Conjunctivae normal.      Pupils: Pupils are equal, round, and reactive to light.   Neck:      Thyroid: No thyromegaly.   Cardiovascular:      Rate and Rhythm: Normal rate and regular rhythm.      Heart sounds: No murmur heard.      Pulmonary:      Effort: Pulmonary effort is normal.      Breath sounds: Normal breath sounds. No wheezing.   Musculoskeletal:         General: Normal range of motion.      Cervical back: Neck supple.   Lymphadenopathy:      Cervical: No cervical adenopathy.   Skin:     General: Skin is warm and dry.   Neurological:      Mental Status: She is alert and oriented to person, place, and time.   Psychiatric:         Mood and Affect: " Mood normal.         Behavior: Behavior normal.        Result Review :                 Assessment and Plan    Diagnoses and all orders for this visit:    1. Cough (Primary)  -     COVID-19,[facility] ROUTINE, NP/OP SWAB IN TRANSPORT MEDIA OR ESWAB 72 HR TAT - Swab, Nasopharynx; Future  -     POCT Influenza A/B  -     COVID-19,[facility] ROUTINE, NP/OP SWAB IN TRANSPORT MEDIA OR ESWAB 72 HR TAT - Swab, Nasopharynx  -     promethazine-dextromethorphan (PROMETHAZINE-DM) 6.25-15 MG/5ML syrup; Take 5 mL by mouth 4 (Four) Times a Day As Needed for Cough.  Dispense: 180 mL; Refill: 0  -     benzonatate (TESSALON) 200 MG capsule; Take 1 capsule by mouth 3 (Three) Times a Day As Needed for Cough.  Dispense: 30 capsule; Refill: 0    2. Acute non-recurrent maxillary sinusitis  -     ipratropium (ATROVENT) 0.06 % nasal spray; 2 sprays into the nostril(s) as directed by provider 4 (Four) Times a Day.  Dispense: 15 mL; Refill: 0  -     amoxicillin (AMOXIL) 500 MG capsule; Take 2 capsules by mouth 2 (Two) Times a Day for 7 days.  Dispense: 28 capsule; Refill: 0        Follow Up   No follow-ups on file.  Patient was given instructions and counseling regarding her condition or for health maintenance advice. Please see specific information pulled into the AVS if appropriate.

## 2022-01-08 LAB
LABCORP SARS-COV-2, NAA 2 DAY TAT: NORMAL
SARS-COV-2 RNA RESP QL NAA+PROBE: NOT DETECTED

## 2022-01-24 ENCOUNTER — OFFICE VISIT (OUTPATIENT)
Dept: FAMILY MEDICINE CLINIC | Facility: CLINIC | Age: 27
End: 2022-01-24

## 2022-01-24 VITALS
SYSTOLIC BLOOD PRESSURE: 122 MMHG | HEIGHT: 65 IN | DIASTOLIC BLOOD PRESSURE: 68 MMHG | WEIGHT: 181 LBS | OXYGEN SATURATION: 96 % | TEMPERATURE: 97.7 F | HEART RATE: 81 BPM | BODY MASS INDEX: 30.16 KG/M2

## 2022-01-24 DIAGNOSIS — J30.89 NON-SEASONAL ALLERGIC RHINITIS, UNSPECIFIED TRIGGER: ICD-10-CM

## 2022-01-24 DIAGNOSIS — F33.0 MAJOR DEPRESSIVE DISORDER, RECURRENT, MILD: ICD-10-CM

## 2022-01-24 DIAGNOSIS — F41.9 ANXIETY: ICD-10-CM

## 2022-01-24 DIAGNOSIS — Z00.01 ENCOUNTER FOR GENERAL ADULT MEDICAL EXAMINATION WITH ABNORMAL FINDINGS: Primary | ICD-10-CM

## 2022-01-24 PROCEDURE — 2014F MENTAL STATUS ASSESS: CPT | Performed by: NURSE PRACTITIONER

## 2022-01-24 PROCEDURE — 99395 PREV VISIT EST AGE 18-39: CPT | Performed by: NURSE PRACTITIONER

## 2022-01-24 PROCEDURE — 3008F BODY MASS INDEX DOCD: CPT | Performed by: NURSE PRACTITIONER
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
## 2022-02-03 NOTE — PROGRESS NOTES
Continued Stay Note  [facility]     Patient Name: Estephania Pathak  MRN: [id number]  Today's Date: 5/26/2020    Admit Date: 5/19/2020    Discharge Plan     Row Name 05/26/20 1025       Plan    Plan  Await discharge disposition of infant from CPS/DCBS. JERMAINE Baig    Plan Comments  Mother: Estephania Pathak, MRN: [id number]; Infant: Roselia Pathak, MRN: [id number]. CSW received call from [facility] that infant may be medically ready today or tomorrow (5/27/20) and requested disposition information from CPS. CSW spoke with CPS worker Hannah Uriarte who advised that the case is supposed to be staffed today and disposition plan will be made at that time. CPS worker Chapito reported she was unsure when the meeting for staffing review of case will occur today, and is unsure if discharge will be possible 5/26/20. CPS worker Chapito also reports she has spoken with mother of infant today 5/26/20 on the phone so mother is aware that CPS is still pending a discharge plan decision. After speaking with CPS worker Chapito, CSW noted that infant cord toxicology results are in. 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 These results are lab confirmed. CSW emailed CPS worker Chapito and informed of cord toxicology results and e-mailed cord toxicology results to CPS worker Chapito as well. CSW will continue to follow to assist with CPS disposition. CSW awaits follow up from CPS regarding disposition. JERMAINE Baig        Discharge Codes    No documentation.       Expected Discharge Date and Time     Expected Discharge Date Expected Discharge Time    May 22, 2020             EVELYN Baig     within normal limits

## 2022-02-21 ENCOUNTER — OFFICE VISIT (OUTPATIENT)
Dept: FAMILY MEDICINE CLINIC | Facility: CLINIC | Age: 27
End: 2022-02-21

## 2022-02-21 VITALS
TEMPERATURE: 97.1 F | BODY MASS INDEX: 31.75 KG/M2 | HEART RATE: 78 BPM | WEIGHT: 190.6 LBS | SYSTOLIC BLOOD PRESSURE: 112 MMHG | OXYGEN SATURATION: 98 % | HEIGHT: 65 IN | DIASTOLIC BLOOD PRESSURE: 78 MMHG

## 2022-02-21 DIAGNOSIS — F33.0 MAJOR DEPRESSIVE DISORDER, RECURRENT, MILD: ICD-10-CM

## 2022-02-21 DIAGNOSIS — F41.9 ANXIETY: Primary | ICD-10-CM

## 2022-02-21 PROCEDURE — 99213 OFFICE O/P EST LOW 20 MIN: CPT | Performed by: NURSE PRACTITIONER

## 2022-02-21 RX ORDER — BUSPIRONE HYDROCHLORIDE 10 MG/1
10 TABLET ORAL 2 TIMES DAILY
Qty: 60 TABLET | Refills: 2 | Status: SHIPPED | OUTPATIENT
Start: 2022-02-21 | End: 2022-04-21 | Stop reason: SDUPTHER

## 2022-02-21 NOTE — PROGRESS NOTES
"Chief Complaint  Anxiety and Depression    Subjective          Estephania Pathak presents to [facility]  History of Present Illness    Patient is here today for 4-week follow-up on anxiety and depression.  At last visit we increased Vraylar dose and continued viibryd.    She hasn't noticed any difference.  She is asking for something for anxiety    Objective   Vital Signs:   /78   Pulse 78   Temp 97.1 °F (36.2 °C)   Ht 165.1 cm (65\")   Wt 86.5 kg (190 lb 9.6 oz)   SpO2 98%   BMI 31.72 kg/m²     Physical Exam  Constitutional:       Appearance: Normal appearance.   Cardiovascular:      Rate and Rhythm: Normal rate and regular rhythm.      Pulses: Normal pulses.   Pulmonary:      Effort: Pulmonary effort is normal.      Breath sounds: Normal breath sounds.   Skin:     General: Skin is warm and dry.   Neurological:      Mental Status: She is alert.   Psychiatric:         Mood and Affect: Mood normal.         Behavior: Behavior normal.         Thought Content: Thought content normal.         Judgment: Judgment normal.        Result Review :                 Assessment and Plan    Diagnoses and all orders for this visit:    1. Anxiety (Primary)    2. Major depressive disorder, recurrent, mild (HCC)    Other orders  -     busPIRone (BUSPAR) 10 MG tablet; Take 1 tablet by mouth 2 (Two) Times a Day.  Dispense: 60 tablet; Refill: 2        We will continue current dose of Vraylar in Viibryd.  I discussed possibly increasing very low.  She declines.  We will trial BuSpar.  Follow-up in 2 months, sooner if needed.      Follow Up   Return in about 2 months (around 4/21/2022) for Next scheduled follow up.  Patient was given instructions and counseling regarding her condition or for health maintenance advice. Please see specific information pulled into the AVS if appropriate.       " 8/2/2017      RE: Meño Omalley  [address] [facility] [id number]       Oncology/Hematology Visit Note  Aug 2, 2017    Reason for Visit: follow up of adenocarcinoma of the rectum    History of Present Illness: Meño Omalley is a 44 year old male with adenocarcinoma of rectum. He initially presented to primary care with blood in his stool for a few months and a change in bowel habits with the stool being more loose. Family history of father who developed colon cancer at age 60, and a sister who developed colon cancer at age 55.  He presented for colonoscopy with Dr. Delio Daniel on 03/24/2017.  Findings were several small polyps and a possible carcinoma of the rectosigmoid junction measured from 17-21 cm.  Biopsies obtained were remarkable for adenocarcinoma.  CT scan of the chest, abdomen and pelvis was done, which confirmed the presence of possible thickening in the rectosigmoid junction, but no evidence of metastases.  A 3D MRI was pursued on 03/31, and it showed circumferential wall thickening of the upper rectum/rectosigmoid with minimal extension into the mesorectal fat, consistent with rectal cancer, stage IIIB, N1.  His case was discussed in the Tumor Board, and the decision was made to forego chemoradiation, but to go straight to surgery, and he underwent a low anterior resection. Surgery took place on 4/12/2017. The pathology on the tumor was moderately differentiated adenocarcinoma invading the muscularis propria with a tumor size of 3.7 cm all negative  carcinoma margins.  Lymphovascular invasion was not identified.  Two non-renea tumor deposits were identified, and 6 lymph nodes were negative for metastatic carcinoma.  He was staged by pathological staging and pT2 N1c.  The mismatch repair testing was done on his original biopsy, and he had a normal pattern of mismatch repair protein expression, ruling out Rodriguez syndrome. Port was placed by IR on 5/23/17.    He started adjuvant  chemotherapy with FOLFOX on 5/24/17. Oxaliplatin was dose-reduced prior to cycle 5 for worsening neuropathy. He is here today prior to cycle 6.      Interval History:  Meño is here today along with his wife. He is overall feeling well. His cold sensitivity last the majority of the cycle last time but his neuropathy was much improved and only lasted a few days before completely resolving. His nausea is still well-controlled with taking Zofran scheduled starting evening of pump d/c and then ativan at bedtime and then the following 4 days. Bowels have been regular to soft. No HFS or overt mucositis. No fevers/chills or infectious concerns. He was able to eat and drink reasonably well and weight is stable.     Review of Systems:  Patient denies any of the following except if noted above: fevers, chills, difficulty with energy, vision or hearing changes, chest pain, dyspnea, abdominal pain, nausea, vomiting, diarrhea, constipation, urinary concerns, headaches, numbness, tingling, issues with sleep or mood. He also denies lumps, bumps, rashes or skin lesions, bleeding or bruising issues.    Current Outpatient Prescriptions   Medication Sig Dispense Refill     ondansetron (ZOFRAN) 8 MG tablet Take 1 tablet (8 mg) by mouth every 8 hours as needed for nausea 60 tablet 1     escitalopram (LEXAPRO) 20 MG tablet Take 1 tablet (20 mg) by mouth every morning 30 tablet 3     prochlorperazine (COMPAZINE) 10 MG tablet Take 1 tablet (10 mg) by mouth every 6 hours as needed (Nausea/Vomiting) 30 tablet 2     LORazepam (ATIVAN) 0.5 MG tablet Take 1 tablet (0.5 mg) by mouth every 4 hours as needed (Anxiety, Nausea/Vomiting or Sleep) 30 tablet 2     fluticasone (FLOVENT HFA) 110 MCG/ACT inhaler Take 2 puffs by mouth 2 times daily as needed        lidocaine-prilocaine (EMLA) cream Apply topically as needed for moderate pain (Patient not taking: Reported on 7/19/2017) 30 g 1       Physical Examination:  General: The patient is a pleasant  "male in no acute distress.  /88  Pulse 62  Temp 98.5  F (36.9  C) (Oral)  Resp 16  Ht 1.753 m (5' 9.02\")  Wt 91.5 kg (201 lb 11.2 oz)  SpO2 98%  BMI 29.77 kg/m2  Wt Readings from Last 10 Encounters:   08/02/17 91.5 kg (201 lb 11.2 oz)   07/19/17 93 kg (205 lb 1.6 oz)   07/05/17 93.1 kg (205 lb 3.2 oz)   06/22/17 93.6 kg (206 lb 4.8 oz)   06/07/17 92.1 kg (203 lb)   05/24/17 92.9 kg (204 lb 14.4 oz)   05/22/17 90.7 kg (200 lb)   05/02/17 91.4 kg (201 lb 8 oz)   05/01/17 91.9 kg (202 lb 9.6 oz)   04/24/17 92.1 kg (203 lb)     HEENT: EOMI, PERRL. Sclerae are anicteric. Oral mucosa is pink and moist with no lesions or thrush.   Lymph: Neck is supple with no lymphadenopathy in the cervical or supraclavicular areas.   Heart: Regular rate and rhythm.   Lungs: Clear to auscultation bilaterally. Port right chest accessed, c/d/i.  Abdomen: Bowel sounds present, soft, nontender with no palpable hepatosplenomegaly or masses. Laporscopic incisions healed - 5 total.   Extremities: No lower extremity edema noted bilaterally.   Neuro: Cranial nerves II through XII are grossly intact.  Skin: No rashes, petechiae, or bruising noted on exposed skin.    Laboratory Data:    8/2/2017 08:38   Sodium 141   Potassium 4.0   Chloride 108   Carbon Dioxide 24   Urea Nitrogen 14   Creatinine 0.84   GFR Estimate >90...   GFR Estimate If Black >90...   Calcium 8.8   Anion Gap 9   Albumin 3.3 (L)   Protein Total 7.0   Bilirubin Total 0.8   Alkaline Phosphatase 74   ALT 59   AST 30   Glucose 91   WBC 2.8 (L)   Hemoglobin 13.3   Hematocrit 39.5 (L)   Platelet Count 146 (L)   RBC Count 4.22 (L)   MCV 94   MCH 31.5   MCHC 33.7   RDW 16.3 (H)   Diff Method Automated Method   % Neutrophils 44.8   % Lymphocytes 37.7   % Monocytes 13.9   % Eosinophils 1.8   % Basophils 1.8   % Immature Granulocytes 0.0   Nucleated RBCs 0   Absolute Neutrophil 1.3 (L)   Absolute Lymphocytes 1.1   Absolute Monocytes 0.4   Absolute Eosinophils 0.1   Absolute " Basophils 0.1   Abs Immature Granulocytes 0.0   Absolute Nucleated RBC 0.0       Assessment and Plan:  1. Oncology. stage III rectosigmoid adenocarcinoma, moderately differentiated, status post APR with good margins. Started adjuvant chemotherapy with FOLFOX and s/p 5 cycles. Oxaliplatin was dose reduced with cycle 5 due to progressive neuropathy which has improved. He is tolerating fairly well with nausea, fatigue, cold sensitivity. Has some dyspepsia as well so trial of H2 blocker daily. His counts are adequate to proceed with cycle 6 today. Reviewed neutropenic precautions days 7-10. Will have restaging after this cycle next week with Dr. Mcarthur. He will call with any interval concerns in the meantime.     Jessica Lomas PA-C  [facility]  [address] [place], [place] [id number] [phone]

## 2022-02-28 RX ORDER — VILAZODONE HYDROCHLORIDE 20 MG/1
TABLET ORAL
Qty: 30 TABLET | Refills: 2 | Status: SHIPPED | OUTPATIENT
Start: 2022-02-28 | End: 2022-04-21 | Stop reason: SDUPTHER

## 2022-03-07 RX ORDER — CARIPRAZINE 1.5 MG/1
CAPSULE, GELATIN COATED ORAL
Qty: 30 CAPSULE | Refills: 2 | OUTPATIENT
Start: 2022-03-07

## 2022-03-18 NOTE — TELEPHONE ENCOUNTER
Caller: Lawson Estephania    Relationship: Self    Best call back number: [phone]     Requested Prescriptions:   Requested Prescriptions      No prescriptions requested or ordered in this encounter    VRAYLAR 1.5MG - DIDN'T SEE IN CHART, THERE WAS ONLY 3MG    Pharmacy where request should be sent: [facility] - [address] - [phone]  - [phone] FX     Additional details provided by patient: PATIENT IS COMPLETELY OUT AND ONLY HAS HER 3MG PRESCRIPTION LEFT. SHE WANTED TO KNOW IF SHE SHOULD TAKE HER 3MG UNTIL THE 1.5MG COMES IN.     Does the patient have less than a 3 day supply:  [x] Yes  [] No    Marvel FERREIRA Rep   03/18/22 13:00 EDT

## 2022-03-18 NOTE — TELEPHONE ENCOUNTER
I increased the dose for her in January.  It is up to her if she would like to trial the increased dose of stay with 1.5mg

## 2022-03-18 NOTE — TELEPHONE ENCOUNTER
Patient should be on the 3 mg unless she is having any other issues.  She did not notify me of any issues in office.  Please clarify this with her.

## 2022-03-18 NOTE — TELEPHONE ENCOUNTER
Pt said that greg delivers her medication to her and she just takes it.  The bottle she has says 1.5mg,  she has never had a conversation with you about changed the dosage, she thought you changed it and send a new dose to she  ( THIS IS THE 2ND PT THIS WEEK I HAVE HAD THIS ISSUE WITH ALCALA)

## 2022-04-21 ENCOUNTER — OFFICE VISIT (OUTPATIENT)
Dept: FAMILY MEDICINE CLINIC | Facility: CLINIC | Age: 27
End: 2022-04-21

## 2022-04-21 VITALS
SYSTOLIC BLOOD PRESSURE: 118 MMHG | HEIGHT: 65 IN | BODY MASS INDEX: 31.82 KG/M2 | DIASTOLIC BLOOD PRESSURE: 62 MMHG | TEMPERATURE: 96.6 F | WEIGHT: 191 LBS | HEART RATE: 78 BPM | OXYGEN SATURATION: 95 %

## 2022-04-21 DIAGNOSIS — F33.0 MAJOR DEPRESSIVE DISORDER, RECURRENT, MILD: ICD-10-CM

## 2022-04-21 DIAGNOSIS — F41.9 ANXIETY: Primary | ICD-10-CM

## 2022-04-21 PROCEDURE — 99213 OFFICE O/P EST LOW 20 MIN: CPT | Performed by: NURSE PRACTITIONER

## 2022-04-21 RX ORDER — VILAZODONE HYDROCHLORIDE 20 MG/1
20 TABLET ORAL DAILY
Qty: 90 TABLET | Refills: 2 | Status: SHIPPED | OUTPATIENT
Start: 2022-04-21 | End: 2022-06-27

## 2022-04-21 RX ORDER — BUSPIRONE HYDROCHLORIDE 10 MG/1
10 TABLET ORAL 2 TIMES DAILY
Qty: 180 TABLET | Refills: 2 | Status: SHIPPED | OUTPATIENT
Start: 2022-04-21 | End: 2022-06-27

## 2022-04-21 NOTE — PROGRESS NOTES
"Chief Complaint  Depression    Subjective          Estephania Pathak presents to [facility]  History of Present Illness     Patient is here today for follow-up on mood disorder.  She is currently on Vraylar 3 mg, Viibryd 20 mg, and BuSpar 10 mg.  She feels like things are the same. She is tired more than usual.   Reports she doesn't have any side effects to medication, but doesn't feel much difference.       Starting a job tomorrow.  If kind of nervous.       Objective   Vital Signs:   /62   Pulse 78   Temp 96.6 °F (35.9 °C)   Ht 165.1 cm (65\")   Wt 86.6 kg (191 lb)   SpO2 95%   BMI 31.78 kg/m²            Physical Exam  Constitutional:       Appearance: Normal appearance.   Cardiovascular:      Rate and Rhythm: Normal rate and regular rhythm.      Pulses: Normal pulses.   Pulmonary:      Effort: Pulmonary effort is normal.      Breath sounds: Normal breath sounds.   Skin:     General: Skin is warm and dry.   Neurological:      Mental Status: She is alert.   Psychiatric:         Mood and Affect: Mood normal.         Behavior: Behavior normal.         Thought Content: Thought content normal.         Judgment: Judgment normal.        Result Review :                 Assessment and Plan    Diagnoses and all orders for this visit:    1. Anxiety (Primary)    2. Major depressive disorder, recurrent, mild (HCC)    Other orders  -     vilazodone (Viibryd) 20 MG tablet tablet; Take 1 tablet by mouth Daily.  Dispense: 90 tablet; Refill: 2  -     Cariprazine HCl (Vraylar) 3 MG capsule capsule; Take 1 capsule by mouth Daily.  Dispense: 90 capsule; Refill: 2  -     busPIRone (BUSPAR) 10 MG tablet; Take 1 tablet by mouth 2 (Two) Times a Day.  Dispense: 180 tablet; Refill: 2      Will continue current dosing of medication for mood.  Any worsening mood notify provider.  If any suicidal homicidal ideations go to the ER.  We will plan follow-up once I am back from maternity leave.  If any issues " sooner notify provider and get in to the office with one of my partners sooner.  She verbalized understanding.      Follow Up   Return in about 8 months (around 12/21/2022).  Patient was given instructions and counseling regarding her condition or for health maintenance advice. Please see specific information pulled into the AVS if appropriate.

## 2022-06-13 ENCOUNTER — TELEPHONE (OUTPATIENT)
Dept: FAMILY MEDICINE CLINIC | Facility: CLINIC | Age: 27
End: 2022-06-13

## 2022-06-13 NOTE — TELEPHONE ENCOUNTER
Caller: Estephania Pathak    Relationship: Self    Best call back number: [phone] (H)    What is the best time to reach you: ANYTIME    Who are you requesting to speak with (clinical staff, provider,  specific staff member): CLINICAL    What was the call regarding: PATIENT IS WANTING TO KNOW IF SHE CAN START TO TAPER OFF OF HER MEDICATIONS. SHE IS CURRENTLY TAKING vilazodone (Viibryd) 20 MG tablet tablet, Cariprazine HCl (Vraylar) 3 MG capsule capsule AND IS WANTING TO COME OFF OF THOSE.     Do you require a callback: YES PLEASE CALL TO DISCUSS AND ADVISE

## 2022-06-13 NOTE — TELEPHONE ENCOUNTER
I feel like we just got her stabilized on these medications and would really recommend she continue for a few months before any taper trials.  Is there a certain reason she wants to taper?

## 2022-06-13 NOTE — TELEPHONE ENCOUNTER
If she is now feeling like they are working compared to previous visit I would recommend following up with me

## 2022-06-15 RX ORDER — PRENATAL VIT/IRON FUM/FOLIC AC 27MG-0.8MG
TABLET ORAL
Qty: 90 TABLET | Refills: 3 | Status: SHIPPED | OUTPATIENT
Start: 2022-06-15 | End: 2022-12-07

## 2022-06-27 ENCOUNTER — OFFICE VISIT (OUTPATIENT)
Dept: FAMILY MEDICINE CLINIC | Facility: CLINIC | Age: 27
End: 2022-06-27

## 2022-06-27 VITALS
BODY MASS INDEX: 31.75 KG/M2 | DIASTOLIC BLOOD PRESSURE: 68 MMHG | HEIGHT: 65 IN | TEMPERATURE: 96 F | OXYGEN SATURATION: 96 % | SYSTOLIC BLOOD PRESSURE: 118 MMHG | HEART RATE: 81 BPM | WEIGHT: 190.6 LBS

## 2022-06-27 DIAGNOSIS — F33.0 MAJOR DEPRESSIVE DISORDER, RECURRENT, MILD: ICD-10-CM

## 2022-06-27 DIAGNOSIS — F41.9 ANXIETY: Primary | ICD-10-CM

## 2022-06-27 DIAGNOSIS — R25.2 LEG CRAMPS: ICD-10-CM

## 2022-06-27 PROCEDURE — 99214 OFFICE O/P EST MOD 30 MIN: CPT | Performed by: NURSE PRACTITIONER

## 2022-06-27 RX ORDER — VILAZODONE HYDROCHLORIDE 10 MG/1
10 TABLET ORAL DAILY
Qty: 30 TABLET | Refills: 1 | Status: SHIPPED | OUTPATIENT
Start: 2022-06-27 | End: 2022-07-25

## 2022-06-27 RX ORDER — BUSPIRONE HYDROCHLORIDE 5 MG/1
5 TABLET ORAL 2 TIMES DAILY
Qty: 60 TABLET | Refills: 1 | Status: SHIPPED | OUTPATIENT
Start: 2022-06-27 | End: 2022-07-25

## 2022-06-27 RX ORDER — CYCLOBENZAPRINE HCL 10 MG
10 TABLET ORAL 3 TIMES DAILY PRN
Qty: 30 TABLET | Refills: 0 | Status: SHIPPED | OUTPATIENT
Start: 2022-06-27 | End: 2022-07-06

## 2022-06-27 NOTE — PROGRESS NOTES
"Chief Complaint  Depression (Wants to come off of her medication)    Subjective        Estephania Pathak presents to [facility]  History of Present Illness     Patient is here today for discussion on possible weaning off medicine for bipolar and anxiety.  Currently she is on BuSpar 10 mg twice a day, Vraylar 3 mg daily, and Viibryd 20 mg daily.  These of been controlling her symptoms and she has been on these doses for about 6 months.    She states she would like to trial off medication because her mom stopped her medication for mood and told her she feels so much better.      States that back of legs have been hurting.  Just started job and then lives in second story.  Has been for last 2 month off and on and then for last 2 weeks daily.  Has had low magnesium in past.      Objective   Vital Signs:  /68   Pulse 81   Temp 96 °F (35.6 °C)   Ht 165.1 cm (65\")   Wt 86.5 kg (190 lb 9.6 oz)   SpO2 96%   BMI 31.72 kg/m²   Estimated body mass index is 31.72 kg/m² as calculated from the following:    Height as of this encounter: 165.1 cm (65\").    Weight as of this encounter: 86.5 kg (190 lb 9.6 oz).          Physical Exam  Constitutional:       Appearance: Normal appearance.   Cardiovascular:      Rate and Rhythm: Normal rate and regular rhythm.      Pulses: Normal pulses.   Pulmonary:      Effort: Pulmonary effort is normal.      Breath sounds: Normal breath sounds.   Skin:     General: Skin is warm and dry.   Neurological:      Mental Status: She is alert.   Psychiatric:         Mood and Affect: Mood normal.         Behavior: Behavior normal.         Thought Content: Thought content normal.         Judgment: Judgment normal.        Result Review :                Assessment and Plan   Diagnoses and all orders for this visit:    1. Anxiety (Primary)  -     CBC & Differential  -     Comprehensive Metabolic Panel  -     TSH  -     Magnesium  -     Vitamin B12    2. Major depressive " disorder, recurrent, mild (HCC)  -     CBC & Differential  -     Comprehensive Metabolic Panel  -     TSH  -     Magnesium  -     Vitamin B12    3. Leg cramps  -     CBC & Differential  -     Comprehensive Metabolic Panel  -     TSH  -     Magnesium  -     Vitamin B12    Other orders  -     cyclobenzaprine (FLEXERIL) 10 MG tablet; Take 1 tablet by mouth 3 (Three) Times a Day As Needed for Muscle Spasms.  Dispense: 30 tablet; Refill: 0  -     vilazodone (Viibryd) 10 MG tablet tablet; Take 1 tablet by mouth Daily.  Dispense: 30 tablet; Refill: 1  -     busPIRone (BUSPAR) 5 MG tablet; Take 1 tablet by mouth 2 (Two) Times a Day.  Dispense: 60 tablet; Refill: 1  -     Cariprazine HCl (Vraylar) 1.5 MG capsule capsule; Take 1 capsule by mouth Daily.  Dispense: 30 capsule; Refill: 1      Cautioned patient that we tried for very long time to find medication that works well for her mood disorder and I am worried by weaning and stopping these medications all of her symptoms are going to come back.  She verbalizes understanding and states that if this happens she will just restart medication.  I discussed with her further that I would like to see her continue but she would really like to stop.  I am decreasing BuSpar to 5 mg twice daily, Vraylar to 1.5 mg, and Viibryd to 10 mg at this time.  We will follow-up in 1 month for medication management.  If worsening mood in the mood notify provider and we will increase medication to previous well-controlled dosing    I discussed with patient I will give muscle relaxer but I would like to check lab levels that she has not had labs done since 2020 to make sure there is no electrolyte imbalance.  She is agreeable to this.  Will call with results any changes needed plan of care.           Follow Up   Return in about 4 weeks (around 7/25/2022).  Patient was given instructions and counseling regarding her condition or for health maintenance advice. Please see specific information pulled  into the AVS if appropriate.

## 2022-06-28 LAB
ALBUMIN SERPL-MCNC: 4.4 G/DL (ref 3.9–5)
ALBUMIN/GLOB SERPL: 1.6 {RATIO} (ref 1.2–2.2)
ALP SERPL-CCNC: 76 IU/L (ref 44–121)
ALT SERPL-CCNC: 11 IU/L (ref 0–32)
AST SERPL-CCNC: 15 IU/L (ref 0–40)
BASOPHILS # BLD AUTO: 0.1 X10E3/UL (ref 0–0.2)
BASOPHILS NFR BLD AUTO: 1 %
BILIRUB SERPL-MCNC: <0.2 MG/DL (ref 0–1.2)
BUN SERPL-MCNC: 15 MG/DL (ref 6–20)
BUN/CREAT SERPL: 21 (ref 9–23)
CALCIUM SERPL-MCNC: 9.6 MG/DL (ref 8.7–10.2)
CHLORIDE SERPL-SCNC: 106 MMOL/L (ref 96–106)
CO2 SERPL-SCNC: 22 MMOL/L (ref 20–29)
CREAT SERPL-MCNC: 0.71 MG/DL (ref 0.57–1)
EGFRCR SERPLBLD CKD-EPI 2021: 119 ML/MIN/1.73
EOSINOPHIL # BLD AUTO: 0.1 X10E3/UL (ref 0–0.4)
EOSINOPHIL NFR BLD AUTO: 1 %
ERYTHROCYTE [DISTWIDTH] IN BLOOD BY AUTOMATED COUNT: 12.6 % (ref 11.7–15.4)
GLOBULIN SER CALC-MCNC: 2.8 G/DL (ref 1.5–4.5)
GLUCOSE SERPL-MCNC: 86 MG/DL (ref 65–99)
HCT VFR BLD AUTO: 43.1 % (ref 34–46.6)
HGB BLD-MCNC: 13.9 G/DL (ref 11.1–15.9)
IMM GRANULOCYTES # BLD AUTO: 0 X10E3/UL (ref 0–0.1)
IMM GRANULOCYTES NFR BLD AUTO: 0 %
LYMPHOCYTES # BLD AUTO: 2.6 X10E3/UL (ref 0.7–3.1)
LYMPHOCYTES NFR BLD AUTO: 27 %
MAGNESIUM SERPL-MCNC: 2.2 MG/DL (ref 1.6–2.3)
MCH RBC QN AUTO: 27.9 PG (ref 26.6–33)
MCHC RBC AUTO-ENTMCNC: 32.3 G/DL (ref 31.5–35.7)
MCV RBC AUTO: 86 FL (ref 79–97)
MONOCYTES # BLD AUTO: 0.6 X10E3/UL (ref 0.1–0.9)
MONOCYTES NFR BLD AUTO: 7 %
NEUTROPHILS # BLD AUTO: 6.2 X10E3/UL (ref 1.4–7)
NEUTROPHILS NFR BLD AUTO: 64 %
PLATELET # BLD AUTO: 361 X10E3/UL (ref 150–450)
POTASSIUM SERPL-SCNC: 4.6 MMOL/L (ref 3.5–5.2)
PROT SERPL-MCNC: 7.2 G/DL (ref 6–8.5)
RBC # BLD AUTO: 4.99 X10E6/UL (ref 3.77–5.28)
SODIUM SERPL-SCNC: 141 MMOL/L (ref 134–144)
TSH SERPL DL<=0.005 MIU/L-ACNC: 1.29 UIU/ML (ref 0.45–4.5)
VIT B12 SERPL-MCNC: 991 PG/ML (ref 232–1245)
WBC # BLD AUTO: 9.6 X10E3/UL (ref 3.4–10.8)

## 2022-07-06 RX ORDER — CYCLOBENZAPRINE HCL 10 MG
TABLET ORAL
Qty: 30 TABLET | Refills: 0 | Status: SHIPPED | OUTPATIENT
Start: 2022-07-06 | End: 2022-07-20

## 2022-07-08 RX ORDER — CARIPRAZINE 3 MG/1
CAPSULE, GELATIN COATED ORAL
Qty: 30 CAPSULE | Refills: 2 | OUTPATIENT
Start: 2022-07-08

## 2022-07-13 RX ORDER — CYCLOBENZAPRINE HCL 10 MG
TABLET ORAL
Qty: 30 TABLET | Refills: 0 | OUTPATIENT
Start: 2022-07-13

## 2022-07-20 RX ORDER — CYCLOBENZAPRINE HCL 10 MG
TABLET ORAL
Qty: 30 TABLET | Refills: 0 | Status: SHIPPED | OUTPATIENT
Start: 2022-07-20 | End: 2022-07-25

## 2022-07-25 ENCOUNTER — OFFICE VISIT (OUTPATIENT)
Dept: FAMILY MEDICINE CLINIC | Facility: CLINIC | Age: 27
End: 2022-07-25

## 2022-07-25 VITALS
DIASTOLIC BLOOD PRESSURE: 72 MMHG | WEIGHT: 191.8 LBS | SYSTOLIC BLOOD PRESSURE: 122 MMHG | BODY MASS INDEX: 31.96 KG/M2 | OXYGEN SATURATION: 97 % | HEIGHT: 65 IN | HEART RATE: 110 BPM | TEMPERATURE: 98.8 F

## 2022-07-25 DIAGNOSIS — F41.9 ANXIETY: Primary | ICD-10-CM

## 2022-07-25 DIAGNOSIS — F33.0 MAJOR DEPRESSIVE DISORDER, RECURRENT, MILD: ICD-10-CM

## 2022-07-25 PROCEDURE — 99213 OFFICE O/P EST LOW 20 MIN: CPT | Performed by: NURSE PRACTITIONER

## 2022-07-25 RX ORDER — BUSPIRONE HYDROCHLORIDE 5 MG/1
5 TABLET ORAL DAILY
Qty: 30 TABLET | Refills: 0 | Status: SHIPPED | OUTPATIENT
Start: 2022-07-25 | End: 2022-08-22

## 2022-07-25 RX ORDER — VILAZODONE HYDROCHLORIDE 10 MG/1
10 TABLET ORAL EVERY OTHER DAY
Qty: 30 TABLET | Refills: 0 | Status: SHIPPED | OUTPATIENT
Start: 2022-07-25 | End: 2022-09-12

## 2022-07-25 RX ORDER — CYCLOBENZAPRINE HCL 10 MG
TABLET ORAL
Qty: 30 TABLET | Refills: 0 | Status: SHIPPED | OUTPATIENT
Start: 2022-07-25 | End: 2022-08-01

## 2022-07-25 RX ORDER — CARIPRAZINE 1.5 MG/1
CAPSULE, GELATIN COATED ORAL
Qty: 30 CAPSULE | Refills: 1 | OUTPATIENT
Start: 2022-07-25

## 2022-07-25 RX ORDER — BUSPIRONE HYDROCHLORIDE 5 MG/1
TABLET ORAL
Qty: 60 TABLET | Refills: 1 | OUTPATIENT
Start: 2022-07-25

## 2022-07-25 RX ORDER — VILAZODONE HYDROCHLORIDE 10 MG/1
TABLET ORAL
Qty: 30 TABLET | Refills: 1 | OUTPATIENT
Start: 2022-07-25

## 2022-07-25 NOTE — PROGRESS NOTES
"Chief Complaint  Anxiety and Depression    Subjective        Estephania Pathak presents to [facility]  History of Present Illness       Patient is here today for follow up on decrease of medication for mood.  Per last visit:    \"Cautioned patient that we tried for very long time to find medication that works well for her mood disorder and I am worried by weaning and stopping these medications all of her symptoms are going to come back.  She verbalizes understanding and states that if this happens she will just restart medication.  I discussed with her further that I would like to see her continue but she would really like to stop.  I am decreasing BuSpar to 5 mg twice daily, Vraylar to 1.5 mg, and Viibryd to 10 mg at this time.  We will follow-up in 1 month for medication management.  If worsening mood in the mood notify provider and we will increase medication to previous well-controlled dosing\"    She reports today that she is doing fine with lower dose.    She reports she would like to continue trying to wean off medication.    Objective   Vital Signs:  /72   Pulse 110   Temp 98.8 °F (37.1 °C)   Ht 165.1 cm (65\")   Wt 87 kg (191 lb 12.8 oz)   SpO2 97%   BMI 31.92 kg/m²   Estimated body mass index is 31.92 kg/m² as calculated from the following:    Height as of this encounter: 165.1 cm (65\").    Weight as of this encounter: 87 kg (191 lb 12.8 oz).          Physical Exam  Constitutional:       Appearance: Normal appearance.   Neurological:      Mental Status: She is alert and oriented to person, place, and time.   Psychiatric:         Mood and Affect: Mood normal.         Behavior: Behavior normal.         Thought Content: Thought content normal.         Judgment: Judgment normal.        Result Review :                Assessment and Plan   Diagnoses and all orders for this visit:    1. Anxiety (Primary)    2. Major depressive disorder, recurrent, mild (HCC)    Other orders  -    "  vilazodone (Viibryd) 10 MG tablet tablet; Take 1 tablet by mouth Every Other Day.  Dispense: 30 tablet; Refill: 0  -     Cariprazine HCl (Vraylar) 1.5 MG capsule capsule; Take 1 capsule by mouth Every Other Day.  Dispense: 30 capsule; Refill: 0  -     busPIRone (BUSPAR) 5 MG tablet; Take 1 tablet by mouth Daily.  Dispense: 30 tablet; Refill: 0      She will be weaning to BuSpar 5 mg once daily, Viibryd and Vraylar every other day for the next 4 weeks and then stop medication.  If she has any issues with this decrease or stopping she needs to get back into office immediately.  Of note, since I will be out on maternity leave, BuSpar, Viibryd, and Vraylar have worked very well for her in the past and we have tried several medications in the past.  If she has any suicidal homicidal ideations I discussed with her she is to go to the ER.  She verbalizes understanding and is agreeable.         Follow Up   No follow-ups on file.  Patient was given instructions and counseling regarding her condition or for health maintenance advice. Please see specific information pulled into the AVS if appropriate.

## 2022-08-01 RX ORDER — CYCLOBENZAPRINE HCL 10 MG
TABLET ORAL
Qty: 30 TABLET | Refills: 0 | Status: SHIPPED | OUTPATIENT
Start: 2022-08-01 | End: 2022-08-14

## 2022-08-01 NOTE — TELEPHONE ENCOUNTER
Patient should not be needing this this often.  It she is needing refills again at this rate she needs to follow up for further evaluation in office.

## 2022-08-05 ENCOUNTER — TELEPHONE (OUTPATIENT)
Dept: FAMILY MEDICINE CLINIC | Facility: CLINIC | Age: 27
End: 2022-08-05

## 2022-08-05 NOTE — TELEPHONE ENCOUNTER
Caller: Abbijv Estephania    Relationship: Self    Best call back number: [phone]     What medication are you requesting: WEIGHT LOSS, PATIENT WOULD LIKE TO ASK  ABOUT   Phentermine     If a prescription is needed, what is your preferred pharmacy and phone number:    [facility] - [facility] [address] [facility] [address]- [phone]  - [phone] FX  [phone]    Additional notes:PATIENT WOULD LIKE TO TRY THIS MEDICATION TO HELP PATIENT LOOSE WEIGHT.    PATIENT WAS ALSO TOLD THIS MEDICATION WOULD HELP HER HAVE  MORE ENERGY.    PLEASE CONTACT PATIENT TO DISCUSS THIS REQUEST AND OMID WALDROP FEELS THIS WOULD BE A GOOD FIT FOR THIS PATIENT.

## 2022-08-05 NOTE — TELEPHONE ENCOUNTER
I don't feel with her history this would be a good choice.  Phentermine is a controlled substance.

## 2022-08-09 ENCOUNTER — OFFICE VISIT (OUTPATIENT)
Dept: OBSTETRICS AND GYNECOLOGY | Age: 27
End: 2022-08-09

## 2022-08-09 VITALS
SYSTOLIC BLOOD PRESSURE: 118 MMHG | HEIGHT: 65 IN | DIASTOLIC BLOOD PRESSURE: 70 MMHG | WEIGHT: 191.4 LBS | BODY MASS INDEX: 31.89 KG/M2

## 2022-08-09 DIAGNOSIS — Z01.419 ENCOUNTER FOR GYNECOLOGICAL EXAMINATION WITHOUT ABNORMAL FINDING: Primary | ICD-10-CM

## 2022-08-09 PROCEDURE — 99395 PREV VISIT EST AGE 18-39: CPT | Performed by: OBSTETRICS & GYNECOLOGY

## 2022-08-09 NOTE — PROGRESS NOTES
Routine Annual Visit    2022    Patient: Estephania Pathak          MR#:[id number]      Chief Complaint   Patient presents with   • Gynecologic Exam     AE Today, Last AE 2021 (-)       History of Present Illness    26 y.o. female  who presents for annual exam.   Going down on methadone from 150 to 23. She wants to stop her mood medications. She has tapered down and feeling well so far  Happy with nexplanon, only occasional bleeding.   Declines any STI screening    Pap UTD    No LMP recorded. Patient has had an implant.  Obstetric History:  OB History        2    Para   1    Term   1       0    AB   1    Living   1       SAB   1    IAB   0    Ectopic   0    Molar   0    Multiple   0    Live Births   1          Obstetric Comments   20 - 33-6 weeks - EFW 54%, AC 74% - [facility]              Menstrual History:     No LMP recorded. Patient has had an implant.       ________________________________________  Patient Active Problem List   Diagnosis   • Tobacco use   • Methadone use   • External hemorrhoids   • Hepatitis C   • Drug use   • Current every day smoker   • Polysubstance abuse (HCC)       Past Medical History:   Diagnosis Date   • Depression    • Hepatitis-C     states not active, never had treatment.     • History of ADHD     AGE 6 TO 12,  TOOK PRESCRIPTION.   • History of vertebral fracture     L5. no surgery, brace and PT   • Hx of drug abuse (HCC)    • Kidney stone 2018   • Opiate addiction (HCC)        Family History   Problem Relation Age of Onset   • Endometriosis Mother    • Malig Hyperthermia Neg Hx    • Breast cancer Neg Hx    • Uterine cancer Neg Hx    • Ovarian cancer Neg Hx    • Colon cancer Neg Hx    • Congenital heart disease Neg Hx        Past Surgical History:   Procedure Laterality Date   • D & C WITH SUCTION N/A 3/15/2019    Procedure: DILATATION AND CURETTAGE WITH SUCTION;  Surgeon: Megan Campos MD;  Location: [facility];  Service: Obstetrics/Gynecology  "  • KIDNEY STONE SURGERY     • WISDOM TOOTH EXTRACTION         Social History     Tobacco Use   Smoking Status Current Every Day Smoker   • Packs/day: 0.25   • Years: 10.00   • Pack years: 2.50   • Types: Cigarettes   Smokeless Tobacco Never Used   Tobacco Comment    not  interested currently-trying to quit on own       has a current medication list which includes the following prescription(s): buspirone, cariprazine hcl, cyclobenzaprine, magnesium oxide, methadone hcl, prenatal vitamin 27-0.8, vilazodone, docusate sodium, wrist support/elastic small, and ipratropium.  ________________________________________      Review of Systems   Constitutional: Negative for fever and unexpected weight change.   Respiratory: Negative for shortness of breath.    Cardiovascular: Negative for chest pain.   Gastrointestinal: Negative for abdominal pain, constipation and diarrhea.   Genitourinary: Negative for frequency and urgency.   Hematological: Negative for adenopathy.   Psychiatric/Behavioral: Negative for dysphoric mood.       Objective   Physical Exam    /70   Ht 165.1 cm (65\")   Wt 86.8 kg (191 lb 6.4 oz)   Breastfeeding No   BMI 31.85 kg/m²    BP Readings from Last 3 Encounters:   08/09/22 118/70   07/25/22 122/72   06/27/22 118/68      Wt Readings from Last 3 Encounters:   08/09/22 86.8 kg (191 lb 6.4 oz)   07/25/22 87 kg (191 lb 12.8 oz)   06/27/22 86.5 kg (190 lb 9.6 oz)         BMI: Body mass index is 31.85 kg/m².       General:   alert, appears stated age and cooperative   Neck: No thyromegaly or LAD   Heart:: regular rate and rhythm, S1, S2 normal, no murmur, click, rub or gallop   Lungs: normal respiratory effort and auscultation   Abdomen: soft, non-tender, without masses or organomegaly   Breast: inspection negative, no nipple discharge or bleeding, no masses or nodularity palpable   Urethra and bladder: urethral meatus normal; bladder nontender to palpation;   Vulva: normal, Bartholin's, Urethra, [place]'s " normal   Vagina: normal mucosa, normal discharge   Cervix: multiparous appearance and no lesions   Uterus: normal size and anteverted   Adnexa: normal adnexa and no mass, fullness, tenderness       Assessment:    normal annual exam     Plan:    Plan     []  Mammogram request made  []  PAP done UTD  []  Labs:   []  GC/Chl/TV  []  DEXA scan   []  Referral for colonoscopy:     Plan to return for annual and Nexplanon removal, insertion next year  She is interested in weight loss medications, advised it may not be a good idea.  Discussed some diet and activity changes    Counseling  [x]  Nutrition  [x]  Physical activity/regular exercise   [x]  Healthy weight  []  Injury prevention  []  Smoking cessation  []  Substance misuse/abuse  [x]  Sexual behavior  []  STD prevention  [x]  Contraception  []  Dental health  []  Mental health  []  Immunization  [x]  Encouraged SBE          Olimpia Franco MD  08/09/2022  13:21 EDT

## 2022-08-14 RX ORDER — CYCLOBENZAPRINE HCL 10 MG
TABLET ORAL
Qty: 30 TABLET | Refills: 0 | Status: SHIPPED | OUTPATIENT
Start: 2022-08-14 | End: 2022-09-09

## 2022-08-14 NOTE — TELEPHONE ENCOUNTER
If pain is persistent enough she needs refills weekly she should follow up for further evaluation.  No more refills until seen otherwise

## 2022-08-15 ENCOUNTER — TELEPHONE (OUTPATIENT)
Dept: FAMILY MEDICINE CLINIC | Facility: CLINIC | Age: 27
End: 2022-08-15

## 2022-08-15 NOTE — TELEPHONE ENCOUNTER
None are going to be covered by insurance and are very expensive.she could make appt with one of the other providers if she wanted to discuss further

## 2022-08-22 RX ORDER — CYCLOBENZAPRINE HCL 10 MG
TABLET ORAL
Qty: 30 TABLET | Refills: 0 | OUTPATIENT
Start: 2022-08-22

## 2022-08-22 RX ORDER — BUSPIRONE HYDROCHLORIDE 5 MG/1
TABLET ORAL
Qty: 30 TABLET | Refills: 2 | Status: SHIPPED | OUTPATIENT
Start: 2022-08-22 | End: 2022-09-12

## 2022-08-29 ENCOUNTER — TELEPHONE (OUTPATIENT)
Dept: OBSTETRICS AND GYNECOLOGY | Age: 27
End: 2022-08-29

## 2022-09-01 ENCOUNTER — OFFICE VISIT (OUTPATIENT)
Dept: OBSTETRICS AND GYNECOLOGY | Age: 27
End: 2022-09-01

## 2022-09-01 VITALS
WEIGHT: 191 LBS | BODY MASS INDEX: 31.82 KG/M2 | SYSTOLIC BLOOD PRESSURE: 122 MMHG | DIASTOLIC BLOOD PRESSURE: 70 MMHG | HEIGHT: 65 IN

## 2022-09-01 DIAGNOSIS — Z97.5 BREAKTHROUGH BLEEDING ON NEXPLANON: Primary | ICD-10-CM

## 2022-09-01 DIAGNOSIS — N92.1 BREAKTHROUGH BLEEDING ON NEXPLANON: Primary | ICD-10-CM

## 2022-09-01 DIAGNOSIS — N92.6 IRREGULAR BLEEDING: ICD-10-CM

## 2022-09-01 PROBLEM — F11.20: Status: ACTIVE | Noted: 2019-10-11

## 2022-09-01 PROBLEM — F33.0 MILD EPISODE OF RECURRENT MAJOR DEPRESSIVE DISORDER: Status: ACTIVE | Noted: 2021-11-30

## 2022-09-01 LAB
B-HCG UR QL: NEGATIVE
EXPIRATION DATE: NORMAL
INTERNAL NEGATIVE CONTROL: NEGATIVE
INTERNAL POSITIVE CONTROL: POSITIVE
Lab: NORMAL

## 2022-09-01 PROCEDURE — 76830 TRANSVAGINAL US NON-OB: CPT | Performed by: OBSTETRICS & GYNECOLOGY

## 2022-09-01 PROCEDURE — 99213 OFFICE O/P EST LOW 20 MIN: CPT | Performed by: NURSE PRACTITIONER

## 2022-09-01 PROCEDURE — 81025 URINE PREGNANCY TEST: CPT | Performed by: NURSE PRACTITIONER

## 2022-09-01 NOTE — PROGRESS NOTES
[facility]   Obstetrics and Gynecology     2022      Patient:  Estephania Pathak   MR#:[id number]    Office note    Chief Complaint   Patient presents with   • Gynecologic Exam     Bleeding x2 weeks, u/s today       Subjective     History of Present Illness  27 y.o. female  presents for evaluation of irregular vaginal bleeding.  She had Nexplanon inserted 2020 and has been amenorrheic with that until 2 weeks ago.        Relevant data reviewed:  Pelvic ultrasound today shows anteverted uterus with endometrial thickness of 3.7 mm.    Patient Active Problem List   Diagnosis   • Tobacco use   • Methadone use   • External hemorrhoids   • Hepatitis C   • Drug use   • Current every day smoker   • Polysubstance abuse (HCC)   • Encounter for long-term methadone use for opiate dependence (HCC)   • Mild episode of recurrent major depressive disorder (HCC)   • Tobacco dependence syndrome       Past Medical History:   Diagnosis Date   • Depression    • Hepatitis-C     states not active, never had treatment.     • History of ADHD     AGE 6 TO 12,  TOOK PRESCRIPTION.   • History of vertebral fracture     L5. no surgery, brace and PT   • Hx of drug abuse (HCC)    • Kidney stone 2018   • Opiate addiction (HCC)      Past Surgical History:   Procedure Laterality Date   • D & C WITH SUCTION N/A 3/15/2019    Procedure: DILATATION AND CURETTAGE WITH SUCTION;  Surgeon: Megan Campos MD;  Location: [facility];  Service: Obstetrics/Gynecology   • KIDNEY STONE SURGERY     • WISDOM TOOTH EXTRACTION       Obstetric History:  OB History        2    Para   1    Term   1       0    AB   1    Living   1       SAB   1    IAB   0    Ectopic   0    Molar   0    Multiple   0    Live Births   1          Obstetric Comments   20 - 33-6 weeks - EFW 54%, AC 74% - F              Menstrual History:     Patient's last menstrual period was 2022 (exact date).       # 1 - Date: 2019, Sex:  None, Weight: None, GA: None, Delivery: None, Apgar1: None, Apgar5: None, Living: None, Birth Comments: None    # 2 - Date: 05/20/20, Sex: Male, Weight: 3933 g (8 lb 10.7 oz), GA: 40w0d, Delivery: Vaginal, Spontaneous, Apgar1: 8, Apgar5: 9, Living: Living, Birth Comments: Scale 1    Family History   Problem Relation Age of Onset   • Endometriosis Mother    • Malig Hyperthermia Neg Hx    • Breast cancer Neg Hx    • Uterine cancer Neg Hx    • Ovarian cancer Neg Hx    • Colon cancer Neg Hx    • Congenital heart disease Neg Hx      Social History     Tobacco Use   • Smoking status: Current Every Day Smoker     Packs/day: 0.25     Years: 10.00     Pack years: 2.50     Types: Cigarettes   • Smokeless tobacco: Never Used   • Tobacco comment: not  interested currently-trying to quit on own   Vaping Use   • Vaping Use: Some days   • Substances: CBD   Substance Use Topics   • Alcohol use: No   • Drug use: Not Currently     Types: Marijuana, Hydrocodone, Cocaine(coke), Fentanyl, Heroin     Comment: heroin daily, pain pills-occasionally, a month for marijuana     Patient has no known allergies.    Current Outpatient Medications:   •  busPIRone (BUSPAR) 5 MG tablet, TAKE ONE TABLET BY MOUTH DAILY, Disp: 30 tablet, Rfl: 2  •  Cariprazine HCl (Vraylar) 1.5 MG capsule capsule, Take 1 capsule by mouth Every Other Day., Disp: 30 capsule, Rfl: 0  •  cyclobenzaprine (FLEXERIL) 10 MG tablet, TAKE ONE TABLET BY MOUTH THREE TIMES DAILY AS NEEDED FOR MUSCLE SPASMS, Disp: 30 tablet, Rfl: 0  •  docusate sodium (COLACE) 100 MG capsule, Take 100 mg by mouth 2 (Two) Times a Day., Disp: , Rfl:   •  Elastic Bandages & Supports (Wrist Support/Elastic Small) misc, 1 each Daily As Needed (wrist pain)., Disp: 1 each, Rfl: 0  •  ipratropium (ATROVENT) 0.06 % nasal spray, 2 sprays into the nostril(s) as directed by provider 4 (Four) Times a Day., Disp: 15 mL, Rfl: 0  •  magnesium oxide (MAG-OX) 400 MG tablet, Take 1 tablet by mouth Daily., Disp: 90  "tablet, Rfl: 3  •  METHADONE HCL PO, Take 23 mg by mouth Daily., Disp: , Rfl:   •  Prenatal Vit-Fe Fumarate-FA (prenatal vitamin 27-0.8) 27-0.8 MG tablet tablet, TAKE ONE TABLET BY MOUTH EVERY DAY, Disp: 90 tablet, Rfl: 3  •  vilazodone (Viibryd) 10 MG tablet tablet, Take 1 tablet by mouth Every Other Day., Disp: 30 tablet, Rfl: 0    The following portions of the patient's history were reviewed and updated as appropriate: allergies, current medications, past family history, past medical history, past social history, past surgical history, and problem list.    Review of Systems   Constitutional: Negative for activity change and appetite change.   HENT: Negative for congestion, ear pain, hearing loss, mouth sores and sore throat.    Eyes: Negative for discharge and itching.   Respiratory: Negative for cough, chest tightness and shortness of breath.    Cardiovascular: Negative for chest pain.   Gastrointestinal: Negative for abdominal pain, blood in stool, constipation, diarrhea and nausea.   Endocrine: Negative for cold intolerance, heat intolerance and polyuria.   Genitourinary: Positive for menstrual problem and vaginal bleeding. Negative for difficulty urinating, dysuria, flank pain and genital sores.   Skin: Negative for color change.   Allergic/Immunologic: Negative for environmental allergies and food allergies.   Neurological: Negative for dizziness and headaches.   Psychiatric/Behavioral: Negative for self-injury and sleep disturbance.       BP Readings from Last 3 Encounters:   09/01/22 122/70   08/09/22 118/70   07/25/22 122/72      Wt Readings from Last 3 Encounters:   09/01/22 86.6 kg (191 lb)   08/09/22 86.8 kg (191 lb 6.4 oz)   07/25/22 87 kg (191 lb 12.8 oz)      BMI: Estimated body mass index is 31.78 kg/m² as calculated from the following:    Height as of this encounter: 165.1 cm (65\").    Weight as of this encounter: 86.6 kg (191 lb). BSA: Estimated body surface area is 1.94 meters squared as " "calculated from the following:    Height as of this encounter: 165.1 cm (65\").    Weight as of this encounter: 86.6 kg (191 lb).    Objective   Physical Exam  Vitals reviewed.   Constitutional:       Appearance: Normal appearance. She is normal weight.   HENT:      Head: Normocephalic and atraumatic.      Nose: Nose normal.      Mouth/Throat:      Mouth: Mucous membranes are moist.   Eyes:      Pupils: Pupils are equal, round, and reactive to light.   Pulmonary:      Effort: Pulmonary effort is normal.   Abdominal:      General: Abdomen is flat.      Palpations: Abdomen is soft.   Musculoskeletal:         General: Normal range of motion.      Cervical back: Normal range of motion and neck supple.   Skin:     General: Skin is warm and dry.   Neurological:      Mental Status: She is alert and oriented to person, place, and time.         Assessment & Plan     Diagnoses and all orders for this visit:    1. Breakthrough bleeding on Nexplanon (Primary)    2. Irregular bleeding  -     US Non-ob Transvaginal    Discussed treatment of irregular bleeding with Nexplanon including 1 to 2 months of OCPs, changing birth control methods or watchful waiting.  She is most comfortable with watchful waiting at this time will call if the bleeding does not get better in several months and will consider OCPs.    Return if symptoms worsen or fail to improve.    Tanesha Okeefe, APRN   9/1/2022 11:52 EDT  "

## 2022-09-09 RX ORDER — CYCLOBENZAPRINE HCL 10 MG
TABLET ORAL
Qty: 30 TABLET | Refills: 0 | Status: SHIPPED | OUTPATIENT
Start: 2022-09-09 | End: 2022-09-12

## 2022-09-12 ENCOUNTER — OFFICE VISIT (OUTPATIENT)
Dept: FAMILY MEDICINE CLINIC | Facility: CLINIC | Age: 27
End: 2022-09-12

## 2022-09-12 VITALS
BODY MASS INDEX: 30.96 KG/M2 | HEART RATE: 87 BPM | TEMPERATURE: 96.8 F | WEIGHT: 185.8 LBS | OXYGEN SATURATION: 98 % | HEIGHT: 65 IN | DIASTOLIC BLOOD PRESSURE: 78 MMHG | SYSTOLIC BLOOD PRESSURE: 126 MMHG

## 2022-09-12 DIAGNOSIS — F41.9 ANXIETY AND DEPRESSION: ICD-10-CM

## 2022-09-12 DIAGNOSIS — R09.81 NASAL CONGESTION: ICD-10-CM

## 2022-09-12 DIAGNOSIS — B97.89 VIRAL RESPIRATORY ILLNESS: Primary | ICD-10-CM

## 2022-09-12 DIAGNOSIS — R41.840 ATTENTION AND CONCENTRATION DEFICIT: ICD-10-CM

## 2022-09-12 DIAGNOSIS — F32.A ANXIETY AND DEPRESSION: ICD-10-CM

## 2022-09-12 DIAGNOSIS — J98.8 VIRAL RESPIRATORY ILLNESS: Primary | ICD-10-CM

## 2022-09-12 LAB
EXPIRATION DATE: NORMAL
EXPIRATION DATE: NORMAL
FLUAV AG NPH QL: NEGATIVE
FLUBV AG NPH QL: NEGATIVE
INTERNAL CONTROL: NORMAL
INTERNAL CONTROL: NORMAL
Lab: NORMAL
Lab: NORMAL
SARS-COV-2 AG UPPER RESP QL IA.RAPID: NOT DETECTED

## 2022-09-12 PROCEDURE — 99212 OFFICE O/P EST SF 10 MIN: CPT | Performed by: STUDENT IN AN ORGANIZED HEALTH CARE EDUCATION/TRAINING PROGRAM

## 2022-09-12 PROCEDURE — 87804 INFLUENZA ASSAY W/OPTIC: CPT | Performed by: STUDENT IN AN ORGANIZED HEALTH CARE EDUCATION/TRAINING PROGRAM

## 2022-09-12 PROCEDURE — 87426 SARSCOV CORONAVIRUS AG IA: CPT | Performed by: STUDENT IN AN ORGANIZED HEALTH CARE EDUCATION/TRAINING PROGRAM

## 2022-09-12 RX ORDER — ACETAMINOPHEN 325 MG/1
650 TABLET ORAL EVERY 6 HOURS PRN
Qty: 100 TABLET | Refills: 2 | Status: SHIPPED | OUTPATIENT
Start: 2022-09-12

## 2022-09-12 NOTE — PATIENT INSTRUCTIONS
You have symptoms consistent with a viral upper respiratory infection.  Try to get plenty of sleep, drink plenty of fluids, you can take Tylenol and ibuprofen to help with symptoms.  If your symptoms get worse, or if you have difficulty breathing please follow-up with us or go to the ER.    Have referred you to psychology for further evaluation of your attention deficit issues.  You should get a call from them to set up that appointment.    Please let us know if you feel like your anxiety/depression symptoms are worsening.

## 2022-09-12 NOTE — PROGRESS NOTES
Chief Complaint  ADD (Used to take adderall and ritalin, but doesn't want a high dose, to concentrate better at work/), Headache, Nasal Congestion, Back Pain, Nausea, and Cough    Subjective        Estephania Pathak presents to [facility]  History of Present Illness  Estephania is here today to discuss getting back on her ADHD medications.  She is having difficulty with focus and is currently trying to get a bank job.  She feels like getting back on her medication may help her be more successful.  She has taken Ritalin and Adderall in the past.  Was diagnosed with ADHD before the age of 8 and was last on medication for this in high school.    Adult ADHD Self-Report Scale (ASRS-v1.1) Symptom Checklist, abbreviated  1. How often do you have trouble wrapping up the final details of a project, once the challenging parts have been done?  At least half the time  2. How often do you have difficulty getting things in order when you have to do a task that requires organization?  At least half the time  3. How often do you have problems remembering appointments or obligations?  Always, can only remember if in calendar  4. When you have a task that requires a lot of thought, how often do you avoid or delay getting started?  Often  5. How often do you fidget or squirm with your hands or feet when you have to sit down for a long time?  Half the time  6. How often do you feel overly active and compelled to do things, like you were driven by a motor?  Never    She is still doing okay since discontinuing her anxiety/depression medications but is uncertain at this point whether she will want to go back on them or not.  She is definitely more emotional since coming off of her medications.    Today she feels sick, having headaches, nausea, lower back pain, also mid back pain, exhausted for the past 3 days.  Feels chiropractor made her pain worse today but she is going there again tomorrow. Frontal headaches come  "and go, ibuprofen helps.  Also with runny nose but no seasonal allergies.  No changes in hearing, no neck swelling.  Needs work excuse for 2d.    Objective   Vital Signs:  /78   Pulse 87   Temp 96.8 °F (36 °C)   Ht 165.1 cm (65\")   Wt 84.3 kg (185 lb 12.8 oz)   SpO2 98%   BMI 30.92 kg/m²   Estimated body mass index is 30.92 kg/m² as calculated from the following:    Height as of this encounter: 165.1 cm (65\").    Weight as of this encounter: 84.3 kg (185 lb 12.8 oz).          Physical Exam  Vitals reviewed.   Constitutional:       General: She is not in acute distress.     Appearance: Normal appearance.   HENT:      Head: Normocephalic and atraumatic.      Right Ear: Tympanic membrane, ear canal and external ear normal.      Left Ear: Tympanic membrane, ear canal and external ear normal.      Nose: Rhinorrhea present.      Mouth/Throat:      Mouth: Mucous membranes are moist.      Pharynx: Posterior oropharyngeal erythema (mild) present.   Eyes:      Extraocular Movements: Extraocular movements intact.      Conjunctiva/sclera: Conjunctivae normal.   Cardiovascular:      Rate and Rhythm: Normal rate and regular rhythm.      Heart sounds: Normal heart sounds. No murmur heard.    No friction rub. No gallop.   Pulmonary:      Effort: Pulmonary effort is normal.      Breath sounds: Normal breath sounds. No wheezing, rhonchi or rales.   Abdominal:      General: Bowel sounds are normal. There is no distension.      Palpations: Abdomen is soft.      Tenderness: There is no abdominal tenderness. There is no guarding.   Musculoskeletal:      Lumbar back: Tenderness (spinal and left paraspinal) present.      Right lower leg: No edema.      Left lower leg: No edema.   Neurological:      General: No focal deficit present.      Mental Status: She is alert. Mental status is at baseline.   Psychiatric:         Mood and Affect: Mood normal.         Behavior: Behavior normal.        Result Review :              "   Assessment and Plan   Diagnoses and all orders for this visit:    1. Viral respiratory illness (Primary)  -     acetaminophen (Tylenol) 325 MG tablet; Take 2 tablets by mouth Every 6 (Six) Hours As Needed for Mild Pain or Moderate Pain.  Dispense: 100 tablet; Refill: 2    2. Nasal congestion  -     Cancel: POC Rapid Strep A  -     POC Influenza A / B  -     POCT CHARIS SARS-CoV-2 Antigen DEVON    3. Attention and concentration deficit  -     Ambulatory Referral to Psychology    4. Anxiety and depression    Patient's headache, runny nose, back pain, nausea for 2 days consistent with a viral URI.  Recommended Tylenol/ibuprofen and encouraged plenty of fluids.  COVID and influenza swabs were negative.    Patient reports feeling stable since discontinuing her anxiety and depression medications, however, she does note having stronger emotional feelings.  She is currently doing fine without her medications but is concerned that she may need to restart them at a future point.  No thoughts of harming herself or others.  Patient to follow-up with us or the ER if symptoms begin to worsen.    Abbreviated ADHD screen today showing some indication of attention deficit.  Patient agreeable to referral for psychiatry for further evaluation and recommendations for management due to her complex psych history.       Follow Up   Return in about 3 months (around 12/12/2022), or if symptoms worsen or fail to improve and for general follow-up.  Patient was given instructions and counseling regarding her condition or for health maintenance advice. Please see specific information pulled into the AVS if appropriate.     Danial Mccann MD

## 2022-09-19 DIAGNOSIS — R25.2 LEG CRAMPS: Primary | ICD-10-CM

## 2022-09-19 RX ORDER — CYCLOBENZAPRINE HCL 10 MG
10 TABLET ORAL 3 TIMES DAILY PRN
Qty: 30 TABLET | Refills: 1 | Status: SHIPPED | OUTPATIENT
Start: 2022-09-19 | End: 2022-10-06 | Stop reason: SDUPTHER

## 2022-09-19 RX ORDER — CYCLOBENZAPRINE HCL 10 MG
TABLET ORAL
Qty: 30 TABLET | Refills: 0 | OUTPATIENT
Start: 2022-09-19

## 2022-09-26 ENCOUNTER — TELEPHONE (OUTPATIENT)
Dept: FAMILY MEDICINE CLINIC | Facility: CLINIC | Age: 27
End: 2022-09-26

## 2022-09-26 NOTE — TELEPHONE ENCOUNTER
Caller: Lawson Estephania    Relationship: Self    Best call back number: [phone]    What medication are you requesting:VYBRID & VRAYLAR    What are your current symptoms: DEPRESSION    How long have you been experiencing symptoms: A WHILE    Have you had these symptoms before:    [x] Yes  [] No    Have you been treated for these symptoms before:   [x] Yes  [] No    If a prescription is needed, what is your preferred pharmacy and phone number: [facility] - [address] - [phone] [facility] [phone] FX     Additional notes:SHE HAS TAKEN THESE BEFORE AND WOULD LIKE TO GET BACK ON THEM.  WE COULD NOT FIND ANY APPOINTMENTS UNTIL November.  IF SHE NEEDS AN APPOINTMENT  CAN YOU WORK HER IN SOONER?

## 2022-09-26 NOTE — TELEPHONE ENCOUNTER
Pt stated she has medication left, pt stated she restarted taking her medications yesterday.  Pt stated she is not off until next Monday.  Pt was advised to call back on her next off day at 8 am for a same day visit.  Pt voiced understanding    It is in [facility] last note 7/25/22 that pt was stable on medications below and to get back to the office immediately if she felt she needed to restart them.

## 2022-10-06 ENCOUNTER — OFFICE VISIT (OUTPATIENT)
Dept: FAMILY MEDICINE CLINIC | Facility: CLINIC | Age: 27
End: 2022-10-06

## 2022-10-06 DIAGNOSIS — R25.2 LEG CRAMPS: ICD-10-CM

## 2022-10-06 DIAGNOSIS — F41.9 ANXIETY AND DEPRESSION: Primary | ICD-10-CM

## 2022-10-06 DIAGNOSIS — F32.A ANXIETY AND DEPRESSION: Primary | ICD-10-CM

## 2022-10-06 PROCEDURE — 99213 OFFICE O/P EST LOW 20 MIN: CPT | Performed by: FAMILY MEDICINE

## 2022-10-06 RX ORDER — VILAZODONE HYDROCHLORIDE 20 MG/1
20 TABLET ORAL DAILY
COMMUNITY
End: 2022-10-06 | Stop reason: SDUPTHER

## 2022-10-06 RX ORDER — CYCLOBENZAPRINE HCL 10 MG
10 TABLET ORAL 3 TIMES DAILY PRN
Qty: 30 TABLET | Refills: 0 | Status: SHIPPED | OUTPATIENT
Start: 2022-10-06 | End: 2022-11-04

## 2022-10-06 RX ORDER — IBUPROFEN 800 MG/1
800 TABLET ORAL EVERY 8 HOURS PRN
Qty: 90 TABLET | Refills: 0 | Status: SHIPPED | OUTPATIENT
Start: 2022-10-06

## 2022-10-06 RX ORDER — VILAZODONE HYDROCHLORIDE 20 MG/1
20 TABLET ORAL DAILY
Qty: 90 TABLET | Refills: 0 | Status: SHIPPED | OUTPATIENT
Start: 2022-10-06 | End: 2022-11-07 | Stop reason: SDUPTHER

## 2022-10-06 NOTE — PROGRESS NOTES
"Chief Complaint  Depression (Wants medication to help with coming off methadone )    Subjective        Estephania Pathak presents to [facility]  History of Present Illness  Estephania Pathak is a 27 y.o. female who presents today to discuss a medication to help with coming off of methadone.    She would like to get on her depression medication again. She started taking her left over Viibryd 20 mg once daily approximately 1 week ago. Her last dose of Viibryd was today. She started having methadone withdrawal symptoms (leg cramps and body aches) 3 to 4 days ago. She was previously seen by Dr. Hayes, who placed a referral for psychology, but did not make an appointment due to losing the phone number.     She has been on methadone for about 2 years and she has been weaning herself per the methadone clinic. She tapered down by 2 mg every 5 days. She was told she was weaning off really fast. It was recommended that she titrate down by 1 mg every 2 weeks.  She inquiries about medications used at rehab that help with withdrawals. She has been taking Flexeril 3 times daily for back pain, which has been helping somewhat. She stopped taking Flexeril when her back felt better, but when she started feeling withdrawal symptoms, she started taking it again. She was prescribed ibuprofen 800 mg, which she likes.    She declines the influenza vaccination today. She is still getting over diarrhea.    Patient currently is not working.    Objective   Vital Signs:  /62   Pulse 98   Temp 98.2 °F (36.8 °C)   Ht 165.1 cm (65\")   Wt 81.1 kg (178 lb 12.8 oz)   SpO2 98%   BMI 29.75 kg/m²   Estimated body mass index is 29.75 kg/m² as calculated from the following:    Height as of this encounter: 165.1 cm (65\").    Weight as of this encounter: 81.1 kg (178 lb 12.8 oz).          Physical Exam  Vitals and nursing note reviewed.   Constitutional:       Appearance: She is well-developed.   HENT:      Head: " Normocephalic and atraumatic.      Right Ear: External ear normal.      Left Ear: External ear normal.      Nose: Nose normal.   Eyes:      General: No scleral icterus.     Conjunctiva/sclera: Conjunctivae normal.   Cardiovascular:      Rate and Rhythm: Normal rate and regular rhythm.      Heart sounds: Normal heart sounds.   Pulmonary:      Effort: Pulmonary effort is normal.      Breath sounds: Normal breath sounds.   Musculoskeletal:      Cervical back: Normal range of motion and neck supple.   Lymphadenopathy:      Cervical: No cervical adenopathy.   Skin:     General: Skin is warm and dry.      Findings: No rash.   Neurological:      Mental Status: She is alert and oriented to person, place, and time.   Psychiatric:         Mood and Affect: Mood normal.         Behavior: Behavior normal.         Thought Content: Thought content normal.         Judgment: Judgment normal.        Result Review :                Assessment and Plan   Diagnoses and all orders for this visit:    1. Anxiety and depression (Primary)  -     vilazodone (VIIBRYD) 20 MG tablet tablet; Take 1 tablet by mouth Daily.  Dispense: 90 tablet; Refill: 0    2. Leg cramps  -     ibuprofen (ADVIL,MOTRIN) 800 MG tablet; Take 1 tablet by mouth Every 8 (Eight) Hours As Needed for Mild Pain.  Dispense: 90 tablet; Refill: 0  -     cyclobenzaprine (FLEXERIL) 10 MG tablet; Take 1 tablet by mouth 3 (Three) Times a Day As Needed for Muscle Spasms.  Dispense: 30 tablet; Refill: 0    1. Depression.  - Patient to continue taking Viibryd 20 mg daily as prescribed. A refill was sent to her preferred pharmacy today.  - She will follow up with JACK Cosme in the next 4 to 6 weeks.    2. Leg cramps/body aches  - Patient to continue taking Flexeril as needed. A refill was sent to the preferred pharmacy today.  - She may take ibuprofen 800 mg every 8 hours with food as needed for body aches.  - I encouraged the patient to hydrate well with water and get plenty  of rest.  - Informed patient that the worst of the methadone withdrawal symptoms will be over in about 72 hours.  - She should go to the ER if unable to cope with symptoms         Follow Up   Return for 4-6 weeks with Luis.  Patient was given instructions and counseling regarding her condition or for health maintenance advice. Please see specific information pulled into the AVS if appropriate.     Transcribed from ambient dictation for Deya Garcia, DO by Matilde Bland.  10/06/22   10:40 EDT    I have personally performed the services described in this document as transcribed by the above individual, and it is both accurate and complete.  Patient verbalized consent to the visit recording.

## 2022-10-07 VITALS
HEIGHT: 65 IN | OXYGEN SATURATION: 98 % | DIASTOLIC BLOOD PRESSURE: 62 MMHG | SYSTOLIC BLOOD PRESSURE: 110 MMHG | TEMPERATURE: 98.2 F | BODY MASS INDEX: 29.79 KG/M2 | HEART RATE: 98 BPM | WEIGHT: 178.8 LBS

## 2022-10-07 RX ORDER — MAGNESIUM OXIDE 400 MG/1
TABLET ORAL
Qty: 90 TABLET | Refills: 3 | Status: SHIPPED | OUTPATIENT
Start: 2022-10-07 | End: 2022-12-07

## 2022-10-28 NOTE — LACTATION NOTE
Attempted to reach patient, no answer and unable to leave a message as pt's mailbox is full    RN sent a message through [employer]      This note was copied from a baby's chart.  Mother reports that infant will not latch to nipple, has been using nipple shield. Discussed how to appropriately apply shield and evaluate for milk transfer. Advised mother to call for latch assist when feeding next time and lactation could attempt to help feed without shield. Discussed that shield use is typically a temporary issue. Discussed lanolin use and monitoring nipples for cracking/bleeding. Advised mother to call for needs. Has personal pump.    S/P small bowel resection General weakness SO (acute kidney injury)

## 2022-11-04 DIAGNOSIS — R25.2 LEG CRAMPS: ICD-10-CM

## 2022-11-04 RX ORDER — CYCLOBENZAPRINE HCL 10 MG
TABLET ORAL
Qty: 30 TABLET | Refills: 1 | Status: SHIPPED | OUTPATIENT
Start: 2022-11-04 | End: 2022-12-30

## 2022-11-07 ENCOUNTER — OFFICE VISIT (OUTPATIENT)
Dept: FAMILY MEDICINE CLINIC | Facility: CLINIC | Age: 27
End: 2022-11-07

## 2022-11-07 VITALS
HEART RATE: 132 BPM | HEIGHT: 65 IN | WEIGHT: 181.2 LBS | BODY MASS INDEX: 30.19 KG/M2 | OXYGEN SATURATION: 99 % | TEMPERATURE: 98.6 F | SYSTOLIC BLOOD PRESSURE: 130 MMHG | DIASTOLIC BLOOD PRESSURE: 88 MMHG

## 2022-11-07 DIAGNOSIS — F41.9 ANXIETY AND DEPRESSION: ICD-10-CM

## 2022-11-07 DIAGNOSIS — F41.9 ANXIETY: ICD-10-CM

## 2022-11-07 DIAGNOSIS — F33.0 MAJOR DEPRESSIVE DISORDER, RECURRENT, MILD: Primary | ICD-10-CM

## 2022-11-07 DIAGNOSIS — F32.A ANXIETY AND DEPRESSION: ICD-10-CM

## 2022-11-07 PROCEDURE — 99213 OFFICE O/P EST LOW 20 MIN: CPT | Performed by: NURSE PRACTITIONER

## 2022-11-07 RX ORDER — VILAZODONE HYDROCHLORIDE 20 MG/1
20 TABLET ORAL DAILY
Qty: 90 TABLET | Refills: 1 | Status: SHIPPED | OUTPATIENT
Start: 2022-11-07 | End: 2023-03-24

## 2022-11-07 NOTE — PROGRESS NOTES
"Chief Complaint  Depression    Subjective        Estephania Pathak presents to [facility]  History of Present Illness     Patient is here today for follow-up on mood.  She had requested to stop mood medications and we weaned off them prior to my leaving for maternity leave.  While I was off she wanted to restart.  She has history of bipolar and only restarted on Viibryd.  She was previously on BuSpar and Vraylar with this medication.  She states she is doing well and well controlled.  Denies any issues with medication.    Doesn't feel like she needs any additional treatment at this point.       Has weaned herself off methadone.      Didn't sign up for appointment with therapy.     Objective   Vital Signs:  /88   Pulse (!) 132   Temp 98.6 °F (37 °C)   Ht 165.1 cm (65\")   Wt 82.2 kg (181 lb 3.2 oz)   SpO2 99%   BMI 30.15 kg/m²   Estimated body mass index is 30.15 kg/m² as calculated from the following:    Height as of this encounter: 165.1 cm (65\").    Weight as of this encounter: 82.2 kg (181 lb 3.2 oz).          Physical Exam  Constitutional:       Appearance: Normal appearance.   Skin:     General: Skin is warm and dry.   Neurological:      Mental Status: She is alert and oriented to person, place, and time.   Psychiatric:         Mood and Affect: Mood normal.         Behavior: Behavior normal.         Thought Content: Thought content normal.         Judgment: Judgment normal.        Result Review :                Assessment and Plan   Diagnoses and all orders for this visit:    1. Major depressive disorder, recurrent, mild (HCC) (Primary)    2. Anxiety    3. Anxiety and depression  -     vilazodone (VIIBRYD) 20 MG tablet tablet; Take 1 tablet by mouth Daily.  Dispense: 90 tablet; Refill: 1      Patient depression anxiety is well controlled with Viibryd.  Continue current dosing.  If patient is considering using she should contact me or methadone clinic.  I discussed possibility " of counseling versus psychiatry.  She declines currently.         Follow Up   Return in about 6 months (around 5/7/2023) for Annual physical.  Patient was given instructions and counseling regarding her condition or for health maintenance advice. Please see specific information pulled into the AVS if appropriate.

## 2022-12-07 ENCOUNTER — OFFICE VISIT (OUTPATIENT)
Dept: FAMILY MEDICINE CLINIC | Facility: CLINIC | Age: 27
End: 2022-12-07

## 2022-12-07 VITALS
HEART RATE: 101 BPM | DIASTOLIC BLOOD PRESSURE: 84 MMHG | HEIGHT: 65 IN | TEMPERATURE: 96.4 F | OXYGEN SATURATION: 97 % | BODY MASS INDEX: 29.72 KG/M2 | SYSTOLIC BLOOD PRESSURE: 122 MMHG | WEIGHT: 178.4 LBS

## 2022-12-07 DIAGNOSIS — R19.7 DIARRHEA OF PRESUMED INFECTIOUS ORIGIN: Primary | ICD-10-CM

## 2022-12-07 PROCEDURE — 99213 OFFICE O/P EST LOW 20 MIN: CPT | Performed by: NURSE PRACTITIONER

## 2022-12-07 RX ORDER — FLUTICASONE PROPIONATE 50 MCG
2 SPRAY, SUSPENSION (ML) NASAL DAILY
Qty: 16 G | Refills: 5 | Status: SHIPPED | OUTPATIENT
Start: 2022-12-07 | End: 2023-04-04

## 2022-12-07 RX ORDER — DIPHENOXYLATE HYDROCHLORIDE AND ATROPINE SULFATE 2.5; .025 MG/1; MG/1
1 TABLET ORAL 4 TIMES DAILY PRN
Qty: 30 TABLET | Refills: 0 | Status: SHIPPED | OUTPATIENT
Start: 2022-12-07 | End: 2022-12-22 | Stop reason: SDUPTHER

## 2022-12-07 RX ORDER — SACCHAROMYCES BOULARDII 250 MG
250 CAPSULE ORAL 2 TIMES DAILY
Qty: 60 CAPSULE | Refills: 2 | Status: ON HOLD | OUTPATIENT
Start: 2022-12-07 | End: 2023-04-06

## 2022-12-07 NOTE — PROGRESS NOTES
"Chief Complaint  Diarrhea (For 2 months)    Subjective        Estephania Pathak presents to [facility]  History of Present Illness     Patient is here today with complaint of diarrhea for last 2 months.   Always has been diarrhea since starting.    With medication 1-2 times daily. Formed with medication.  Without 4 times daily.  Reports it has been green a few times, but hasn't noticed any unusual odor to it.    Denies any blood in diarrhea.       Has had normal stressors, but no increase in anxiety.         Denies any changes of medication lately.  Denies any abdominal pain, nausea, vomiting.  States she has been able to eat and drink fine    OTC: pepto bismol and imodium randomly.     Has also had a cough, congestion, drainage recently.      Objective   Vital Signs:  /84   Pulse 101   Temp 96.4 °F (35.8 °C)   Ht 165.1 cm (65\")   Wt 80.9 kg (178 lb 6.4 oz)   SpO2 97%   BMI 29.69 kg/m²   Estimated body mass index is 29.69 kg/m² as calculated from the following:    Height as of this encounter: 165.1 cm (65\").    Weight as of this encounter: 80.9 kg (178 lb 6.4 oz).          Physical Exam  Constitutional:       Appearance: Normal appearance.   Abdominal:      General: Bowel sounds are normal.      Palpations: Abdomen is soft.   Neurological:      Mental Status: She is alert and oriented to person, place, and time.   Psychiatric:         Mood and Affect: Mood normal.         Behavior: Behavior normal.         Thought Content: Thought content normal.         Judgment: Judgment normal.        Result Review :                Assessment and Plan   Diagnoses and all orders for this visit:    1. Diarrhea of presumed infectious origin (Primary)  -     Stool Culture (Reference Lab) - Stool, Per Rectum; Future  -     Clostridioides difficile Toxin, PCR - Stool, Per Rectum; Future  -     Campylobacter Culture, Stool - Stool, Per Rectum; Future  -     Fecal Leukocytes - Stool, Per Rectum; " Future  -     H. Pylori Antigen, Stool - Stool, Per Rectum; Future  -     H. Pylori Antigen, Stool - Stool, Per Rectum  -     Fecal Leukocytes - Stool, Per Rectum  -     Campylobacter Culture, Stool - Stool, Per Rectum  -     Clostridioides difficile Toxin, PCR - Stool, Per Rectum  -     Stool Culture (Reference Lab) - Stool, Per Rectum  -     diphenoxylate-atropine (Lomotil) 2.5-0.025 MG per tablet; Take 1 tablet by mouth 4 (Four) Times a Day As Needed for Diarrhea.  Dispense: 30 tablet; Refill: 0    Other orders  -     saccharomyces boulardii (Florastor) 250 MG capsule; Take 1 capsule by mouth 2 (Two) Times a Day.  Dispense: 60 capsule; Refill: 2  -     fluticasone (Flonase) 50 MCG/ACT nasal spray; 2 sprays into the nostril(s) as directed by provider Daily.  Dispense: 16 g; Refill: 5      Discussed with patient possible causes for long-term diarrhea.  Because of long-term I would like to obtain stool cultures.  Patient may use Lomotil on a as needed basis.  I cautioned on use because we do not want to count as a rebound constipation.  Start on probiotic daily.  Continue to stay hydrated.  If no improvement in symptoms notify provider.           Follow Up   No follow-ups on file.  Patient was given instructions and counseling regarding her condition or for health maintenance advice. Please see specific information pulled into the AVS if appropriate.

## 2022-12-12 LAB
BACTERIA SPEC CULT: NORMAL
BACTERIA SPEC CULT: NORMAL
C DIFF TOX GENS STL QL NAA+PROBE: NEGATIVE
CAMPYLOBACTER STL CULT: NORMAL
E COLI SXT STL QL IA: NEGATIVE
H PYLORI AG STL QL IA: NEGATIVE
SALM + SHIG STL CULT: NORMAL
WBC STL QL MICRO: NORMAL
WBC STL QL MICRO: NORMAL

## 2022-12-22 ENCOUNTER — TELEPHONE (OUTPATIENT)
Dept: FAMILY MEDICINE CLINIC | Facility: CLINIC | Age: 27
End: 2022-12-22

## 2022-12-22 DIAGNOSIS — R19.7 DIARRHEA OF PRESUMED INFECTIOUS ORIGIN: ICD-10-CM

## 2022-12-22 RX ORDER — DIPHENOXYLATE HYDROCHLORIDE AND ATROPINE SULFATE 2.5; .025 MG/1; MG/1
1 TABLET ORAL 4 TIMES DAILY PRN
Qty: 20 TABLET | Refills: 0 | Status: SHIPPED | OUTPATIENT
Start: 2022-12-22 | End: 2023-01-12 | Stop reason: SDUPTHER

## 2022-12-22 NOTE — TELEPHONE ENCOUNTER
That medication is not supposed to be on long-term.  I will give her a few more for as needed use.  However, if diarrhea is continuing she needs to make an appointment with me for further evaluation and referral.  Is she taking probiotic and considering diet as we discussed in visit?

## 2022-12-22 NOTE — TELEPHONE ENCOUNTER
Caller: Estephania Pathak    Relationship: Self    Best call back number:[phone]    What medication are you requesting: ANTI-DIARRHEA    What are your current symptoms: DIARRHEA    Have you had these symptoms before:    [x] Yes  [] No    Have you been treated for these symptoms before:   [x] Yes  [] No    If a prescription is needed, what is your preferred pharmacy and phone number: [facility] - [place] [address] - [phone] [facility] [phone]      Additional notes: PATIENT STATES SHE WAS PRESCRIBED ANTI-DIARRHEA MEDICATION A COUPLE WEEKS AGO WHICH HAS HELPED, BUT SHE CAN NOT REMEMBER THE NAME OF THE MEDICATION AND IS HAVING DIARRHEA AGAIN WHEN SHE MISSES A DOSE OF THE MEDICATION.    PATIENT STATES SHE WILL BE OUT OF THE MEDICATION AFTER TOMORROW MORNING.     PATIENT IS REQUESTING A CALL BACK TO LET HER KNOW IF THE MEDICATION CAN BE CALLED IN OR NOT.

## 2022-12-30 DIAGNOSIS — R25.2 LEG CRAMPS: ICD-10-CM

## 2022-12-30 RX ORDER — CYCLOBENZAPRINE HCL 10 MG
TABLET ORAL
Qty: 30 TABLET | Refills: 1 | Status: SHIPPED | OUTPATIENT
Start: 2022-12-30 | End: 2023-02-24

## 2023-01-12 ENCOUNTER — OFFICE VISIT (OUTPATIENT)
Dept: FAMILY MEDICINE CLINIC | Facility: CLINIC | Age: 28
End: 2023-01-12
Payer: COMMERCIAL

## 2023-01-12 VITALS
HEIGHT: 65 IN | BODY MASS INDEX: 29.85 KG/M2 | WEIGHT: 179.2 LBS | OXYGEN SATURATION: 99 % | SYSTOLIC BLOOD PRESSURE: 122 MMHG | TEMPERATURE: 97.8 F | HEART RATE: 92 BPM | DIASTOLIC BLOOD PRESSURE: 72 MMHG

## 2023-01-12 DIAGNOSIS — K52.9 CHRONIC DIARRHEA: Primary | ICD-10-CM

## 2023-01-12 DIAGNOSIS — F32.A ANXIETY AND DEPRESSION: ICD-10-CM

## 2023-01-12 DIAGNOSIS — F41.9 ANXIETY AND DEPRESSION: ICD-10-CM

## 2023-01-12 PROCEDURE — 99214 OFFICE O/P EST MOD 30 MIN: CPT | Performed by: NURSE PRACTITIONER

## 2023-01-12 RX ORDER — PRENATAL VIT/IRON FUM/FOLIC AC 27MG-0.8MG
1 TABLET ORAL DAILY
COMMUNITY
Start: 2023-01-01

## 2023-01-12 RX ORDER — DIPHENOXYLATE HYDROCHLORIDE AND ATROPINE SULFATE 2.5; .025 MG/1; MG/1
1 TABLET ORAL 4 TIMES DAILY PRN
Qty: 60 TABLET | Refills: 0 | Status: SHIPPED | OUTPATIENT
Start: 2023-01-12 | End: 2023-03-06 | Stop reason: SDUPTHER

## 2023-01-12 NOTE — PROGRESS NOTES
"Chief Complaint  Diarrhea (For 3 months)    Subjective        Estephania Pathak presents to [facility]  History of Present Illness     Patient is here today for persistent diarrhea symptoms.I saw patient originally for this on December 7 and completed stool sample which were negative for all infection.  I started her on Florastor and gave her Lomotil for as needed use.  She has requested refills on the Lomotil since.  She canceled 2 appointments for follow-up on this.    States that with lomitil it helped and then at end of day she would start having diarrhea at end of night.      Will go 3-4 times daily now diarrhea.  When had it would have normal BM.      Has tried to create food diary.  Cannot attribute to anything    Denies any blood in stool or any abdominal pain.    Mostly watery still.      Also has another recurrent complaint today.  Has been more irritated with mood lately.    Feels more stress in life lately         Objective   Vital Signs:  /72   Pulse 92   Temp 97.8 °F (36.6 °C)   Ht 165.1 cm (65\")   Wt 81.3 kg (179 lb 3.2 oz)   SpO2 99%   BMI 29.82 kg/m²   Estimated body mass index is 29.82 kg/m² as calculated from the following:    Height as of this encounter: 165.1 cm (65\").    Weight as of this encounter: 81.3 kg (179 lb 3.2 oz).             Physical Exam  Constitutional:       Appearance: Normal appearance.   Cardiovascular:      Rate and Rhythm: Normal rate and regular rhythm.      Pulses: Normal pulses.   Pulmonary:      Effort: Pulmonary effort is normal.      Breath sounds: Normal breath sounds.   Skin:     General: Skin is warm and dry.   Neurological:      Mental Status: She is alert.   Psychiatric:         Mood and Affect: Mood normal.         Behavior: Behavior normal.         Thought Content: Thought content normal.         Judgment: Judgment normal.        Result Review :                   Assessment and Plan   Diagnoses and all orders for this " visit:    1. Chronic diarrhea (Primary)  -     diphenoxylate-atropine (Lomotil) 2.5-0.025 MG per tablet; Take 1 tablet by mouth 4 (Four) Times a Day As Needed for Diarrhea.  Dispense: 60 tablet; Refill: 0  -     Ambulatory Referral to Gastroenterology    2. Anxiety and depression    Other orders  -     Cariprazine HCl (Vraylar) 1.5 MG capsule capsule; Take 1 capsule by mouth Daily.  Dispense: 30 capsule; Refill: 2    Due to chronic diarrhea I would like her to see gastroenterologist.  We discussed possible diagnoses and how they would find these.  I will give Lomotil for as needed use.  If starts to have formed bowel movement should try to wean down off of this.  I do not see this as an acute as she is still having enough energy to do activities of daily living and weight has remained the same since starting.    Anxiety and depression-since she is having recurrent symptoms we will trial Vraylar.  If any issues or side effects notify provider.  If any suicidal homicidal ideations go to the ER.  Continue Viibryd.  We will follow-up in 4 weeks for medication management.  She verbalized understanding and is agreeable.         Follow Up   Return in about 4 weeks (around 2/9/2023), or if symptoms worsen or fail to improve, for Next scheduled follow up, Video visit.  Patient was given instructions and counseling regarding her condition or for health maintenance advice. Please see specific information pulled into the AVS if appropriate.

## 2023-02-09 ENCOUNTER — OFFICE VISIT (OUTPATIENT)
Dept: FAMILY MEDICINE CLINIC | Facility: CLINIC | Age: 28
End: 2023-02-09
Payer: COMMERCIAL

## 2023-02-09 VITALS
HEART RATE: 96 BPM | BODY MASS INDEX: 30.02 KG/M2 | HEIGHT: 65 IN | TEMPERATURE: 98.7 F | SYSTOLIC BLOOD PRESSURE: 112 MMHG | OXYGEN SATURATION: 98 % | WEIGHT: 180.2 LBS | DIASTOLIC BLOOD PRESSURE: 76 MMHG

## 2023-02-09 DIAGNOSIS — Z11.3 SCREENING FOR STD (SEXUALLY TRANSMITTED DISEASE): ICD-10-CM

## 2023-02-09 DIAGNOSIS — B00.9 HERPES SIMPLEX TYPE 2 INFECTION: Primary | ICD-10-CM

## 2023-02-09 PROCEDURE — 99214 OFFICE O/P EST MOD 30 MIN: CPT | Performed by: NURSE PRACTITIONER

## 2023-02-10 NOTE — PROGRESS NOTES
"Chief Complaint  Anxiety, Depression, and Exposure to STD    Subjective        Estephania Pathak presents to [facility]  History of Present Illness    Patient is here today to discuss positive herpes 2 screening at urgent care.  She is very freaked out and has a lot of questions for me about this diagnosis    She would also like further testing for all STDs.    Objective   Vital Signs:  /76   Pulse 96   Temp 98.7 °F (37.1 °C)   Ht 165.1 cm (65\")   Wt 81.7 kg (180 lb 3.2 oz)   SpO2 98%   BMI 29.99 kg/m²   Estimated body mass index is 29.99 kg/m² as calculated from the following:    Height as of this encounter: 165.1 cm (65\").    Weight as of this encounter: 81.7 kg (180 lb 3.2 oz).             Physical Exam  Constitutional:       Appearance: Normal appearance.   Neurological:      Mental Status: She is alert and oriented to person, place, and time.   Psychiatric:         Mood and Affect: Mood is anxious. Affect is tearful.        Result Review :                   Assessment and Plan   Diagnoses and all orders for this visit:    1. Herpes simplex type 2 infection (Primary)    2. Screening for STD (sexually transmitted disease)  -     Hepatitis panel, acute; Future  -     HIV-1/O/2 ANTIGEN/ANTIBODY, 4TH GENERATION; Future  -     RPR; Future      I discussed with her all her questions and gave her a informational packet from the CDC about herpes simplex type II.  If she has any further questions she is going to reach back out to me and will let me know.  I encouraged her to take medications for BV, yeast infection, and herpes.  I discussed with her she will not have to take repeat yeast infection or BV medicine unless she has recurrent infection.  If she has another outbreak of herpes then we can consider doing a repeat Valtrex.  It is not necessary to do preventative unless she has repetitive outbreaks.  She verbalizes understanding and is agreeable    She will return at her " convenience for lab work for other STD screening.    Time spent on visit was 25 minutes           Follow Up   No follow-ups on file.  Patient was given instructions and counseling regarding her condition or for health maintenance advice. Please see specific information pulled into the AVS if appropriate.

## 2023-02-20 ENCOUNTER — PREP FOR SURGERY (OUTPATIENT)
Dept: SURGERY | Facility: SURGERY CENTER | Age: 28
End: 2023-02-20
Payer: COMMERCIAL

## 2023-02-20 ENCOUNTER — OFFICE VISIT (OUTPATIENT)
Dept: GASTROENTEROLOGY | Facility: CLINIC | Age: 28
End: 2023-02-20
Payer: COMMERCIAL

## 2023-02-20 VITALS
HEART RATE: 101 BPM | OXYGEN SATURATION: 98 % | HEIGHT: 65 IN | BODY MASS INDEX: 29.94 KG/M2 | SYSTOLIC BLOOD PRESSURE: 120 MMHG | WEIGHT: 179.7 LBS | TEMPERATURE: 97.7 F | DIASTOLIC BLOOD PRESSURE: 82 MMHG

## 2023-02-20 DIAGNOSIS — K58.0 IRRITABLE BOWEL SYNDROME WITH DIARRHEA: Primary | ICD-10-CM

## 2023-02-20 DIAGNOSIS — R19.7 DIARRHEA, UNSPECIFIED TYPE: ICD-10-CM

## 2023-02-20 DIAGNOSIS — R19.7 DIARRHEA, UNSPECIFIED TYPE: Primary | ICD-10-CM

## 2023-02-20 PROCEDURE — 99214 OFFICE O/P EST MOD 30 MIN: CPT

## 2023-02-20 RX ORDER — SODIUM CHLORIDE 0.9 % (FLUSH) 0.9 %
10 SYRINGE (ML) INJECTION AS NEEDED
Status: CANCELLED | OUTPATIENT
Start: 2023-02-20

## 2023-02-20 RX ORDER — SODIUM CHLORIDE, SODIUM LACTATE, POTASSIUM CHLORIDE, CALCIUM CHLORIDE 600; 310; 30; 20 MG/100ML; MG/100ML; MG/100ML; MG/100ML
30 INJECTION, SOLUTION INTRAVENOUS CONTINUOUS PRN
Status: CANCELLED | OUTPATIENT
Start: 2023-02-20

## 2023-02-20 RX ORDER — SODIUM CHLORIDE 0.9 % (FLUSH) 0.9 %
3 SYRINGE (ML) INJECTION EVERY 12 HOURS SCHEDULED
Status: CANCELLED | OUTPATIENT
Start: 2023-02-20

## 2023-02-20 NOTE — PROGRESS NOTES
Chief Complaint   Patient presents with   • Diarrhea           History of Present Illness    Patient is a 27 y.o. who presents to the office as a new patient after referral received from primary care provider JACK Cosme. Patient has a significant past medical history of opiate use with cessation of methadone prior to symptom onset in 2022, hepatitis C-denies previous treatment due to an active infection at time of diagnosis,    Describes bowel habits prior to stopping methadone therapy as occurring every 2 to 3 days without melena or hematochezia that was a soft to firm consistency.    Reports that within 1 month of discontinuing methadone she developed increase in frequency of bowel movements occurring 5-7 times per day that were watery in consistency denies melena hematochezia.  Denies bowel frequency in relation to oral intake of solids or liquids.    Denies abdominal pain.  No unintentional weight loss.    In December 2022 patient was placed on Lomotil 1 tablet 4 times a day with subsequent reduction in stool frequency and describes current bowel habits as occurring 1 time per day and is a soft formed consistency-states that she is currently taking 1 Lomotil tablet every morning.     She will occasionally experience abdominal bloating with fecal urgency without incontinence-which has improved while taking Lomotil.    12/7/2022 Stool studies requested by primary care provider were negative for infectious etiology.  Prior to symptom onset she denies travel or antibiotic use or ill contact.    She denies prior colonoscopy or EGD evaluation.    Denies upper GI symptoms including heartburn, nausea, vomiting, or dysphagia.    On 2/1/23-patient was diagnosed with HSV-2 bacterial vaginosis (treated with Flagyl), and Candida albicans (treated with fluconazole) via vaginal swab     Reports occasional use of NSAIDs-denies daily use.    Patient denies known family history of colon polyps, colon cancer, or IBD.      "  Result Review :         Clostridioides difficile Toxin, PCR - , (12/07/2022 12:24)  Fecal Leukocytes - , (12/07/2022 12:24)  Stool Culture (Reference Lab) - , (12/07/2022 12:24)  H. Pylori Antigen, Stool - Stool, Per Rectum (12/07/2022 12:24)  QuantiFERON-TB Gold Plus (10/20/2022 15:53)      Vital Signs:   /82   Pulse 101   Temp 97.7 °F (36.5 °C)   Ht 165.1 cm (65\")   Wt 81.5 kg (179 lb 11.2 oz)   SpO2 98%   BMI 29.90 kg/m²     Body mass index is 29.9 kg/m².     Physical Exam  Vitals reviewed.   Constitutional:       General: She is not in acute distress.     Appearance: She is well-developed.   HENT:      Head: Normocephalic and atraumatic.   Pulmonary:      Effort: Pulmonary effort is normal. No respiratory distress.   Abdominal:      General: Abdomen is flat. Bowel sounds are normal. There is no distension.      Palpations: Abdomen is soft. There is no mass.      Tenderness: There is no abdominal tenderness. There is no guarding or rebound.      Hernia: No hernia is present.   Skin:     General: Skin is dry.      Coloration: Skin is not pale.   Neurological:      Mental Status: She is alert and oriented to person, place, and time.   Psychiatric:         Thought Content: Thought content normal.           Assessment and Plan    Diagnoses and all orders for this visit:    1. Irritable bowel syndrome with diarrhea (Primary)  -     riFAXIMin (Xifaxan) 550 MG tablet; Take 1 tablet by mouth 3 (Three) Times a Day for 14 days.  Dispense: 42 tablet; Refill: 2    2. Diarrhea, unspecified type           Discussion:    Patient presents today for evaluation of chronic diarrhea, this is an undiagnosed problem.  Reviewed previous health records with patient at length.      Patient verbalizes increased anxiety over performing colonoscopy evaluation to assess for microscopic colitis.      Will start patient on Xifaxan 3 times daily for 14 days for irritable bowel syndrome with diarrhea-if symptoms improve without " recurrence to consider foregoing colonoscopy evaluation pending clinical response.    Lengthy discussion provided to patient on importance of determining lowest effective dose with Lomotil to reduce dosing frequency.  Recommend starting fiber supplementation to aid in producing soft formed bowel movements as well as continuing daily probiotic supplementation.    She is also scheduled to have additional blood work obtained with primary care for monitoring of hepatitis C-encourage patient to keep scheduled appointment as planned for disease monitoring.    Patient is agreeable to the outlined above treatment plan.  Verbalizes understanding and will contact office for any new or worsening concerns.  All questions answered and support provided.        Patient Instructions   Schedule colonoscopy, orders placed.    Follow-up visit after colonoscopy to review findings and make additional recommendations if needed.     We recommend starting a daily fiber supplement such as metamucil, benefiber      These can be purchased over-the-counter, generic brand is fine.  Fiber supplements can take 12 to 72 hours to start working. Make sure to drink 6 to 12 ounces of water or noncarbonated beverage with fiber supplement.     Recommended starting with half of product listed daily dose for 7 days to help reduce risk of abdominal bloating/gas and allow your body to acclimate to fiber diet intake.  If you do not experience any of these adverse effects may increase to daily dose listed on product.      For IBS-D Symptoms:     1.We will prescribe you a course of Xifaxan for IBS-D. You will take 1 tab 3 x daily x 14 days.             EMR Dragon/Transcription Disclaimer:  This document has been Dictated utilizing Dragon dictation.

## 2023-02-20 NOTE — PATIENT INSTRUCTIONS
Schedule colonoscopy, orders placed.    Follow-up visit after colonoscopy to review findings and make additional recommendations if needed.     We recommend starting a daily fiber supplement such as metamucil, benefiber      These can be purchased over-the-counter, generic brand is fine.  Fiber supplements can take 12 to 72 hours to start working. Make sure to drink 6 to 12 ounces of water or noncarbonated beverage with fiber supplement.     Recommended starting with half of product listed daily dose for 7 days to help reduce risk of abdominal bloating/gas and allow your body to acclimate to fiber diet intake.  If you do not experience any of these adverse effects may increase to daily dose listed on product.      For IBS-D Symptoms:     1.We will prescribe you a course of Xifaxan for IBS-D. You will take 1 tab 3 x daily x 14 days.

## 2023-02-24 DIAGNOSIS — R25.2 LEG CRAMPS: ICD-10-CM

## 2023-02-24 RX ORDER — CYCLOBENZAPRINE HCL 10 MG
TABLET ORAL
Qty: 30 TABLET | Refills: 1 | Status: SHIPPED | OUTPATIENT
Start: 2023-02-24

## 2023-02-28 ENCOUNTER — TELEPHONE (OUTPATIENT)
Dept: GASTROENTEROLOGY | Facility: CLINIC | Age: 28
End: 2023-02-28
Payer: COMMERCIAL

## 2023-02-28 NOTE — TELEPHONE ENCOUNTER
Patient would like to let you know that she was not honest with you about drinking. Per the patient she didn't fully understand the question. Patient states that she drinks 2-3 days per week, and has 2-3 shots each time.    She felt like you needed to know this to help find out what is wrong with her.    No call back needed, per patient.

## 2023-03-06 DIAGNOSIS — K52.9 CHRONIC DIARRHEA: ICD-10-CM

## 2023-03-06 RX ORDER — DIPHENOXYLATE HYDROCHLORIDE AND ATROPINE SULFATE 2.5; .025 MG/1; MG/1
1 TABLET ORAL 4 TIMES DAILY PRN
Qty: 60 TABLET | Refills: 0 | Status: SHIPPED | OUTPATIENT
Start: 2023-03-06

## 2023-03-06 NOTE — TELEPHONE ENCOUNTER
Caller: Estephania Pathak    Relationship: Self    Best call back number: [phone]    Requested Prescriptions:   Requested Prescriptions     Pending Prescriptions Disp Refills   • diphenoxylate-atropine (Lomotil) 2.5-0.025 MG per tablet 60 tablet 0     Sig: Take 1 tablet by mouth 4 (Four) Times a Day As Needed for Diarrhea.        Pharmacy where request should be sent: [facility] - [place] [id number] - [phone] [facility] [phone]      Additional details provided by patient: PATIENT STATES SHE HAS 4 TABLETS REMAINING    Does the patient have less than a 3 day supply:  [x] Yes  [] No    Would you like a call back once the refill request has been completed: [x] Yes [] No    If the office needs to give you a call back, can they leave a voicemail: [x] Yes [] No    Alexa Boles   03/06/23 07:55 EST

## 2023-03-13 RX ORDER — CARIPRAZINE 1.5 MG/1
CAPSULE, GELATIN COATED ORAL
Qty: 30 CAPSULE | Refills: 2 | Status: SHIPPED | OUTPATIENT
Start: 2023-03-13

## 2023-03-17 ENCOUNTER — TELEPHONE (OUTPATIENT)
Dept: FAMILY MEDICINE CLINIC | Facility: CLINIC | Age: 28
End: 2023-03-17
Payer: COMMERCIAL

## 2023-03-17 NOTE — TELEPHONE ENCOUNTER
Pt called and stated she is on her way to [place] and the last 2 times she has had drops of pink blood on her toilet paper when she wiped her rectum.  Pt stated she had not had a bowel movement.  Pt was offered an appt. But because she was on her way out of town she denied appt.  Pt advised if it gets worse over the weekend to go to the nearest .  Pt advised if she is still having this problem on Monday to call us and schedule an appt

## 2023-03-24 DIAGNOSIS — F32.A ANXIETY AND DEPRESSION: ICD-10-CM

## 2023-03-24 DIAGNOSIS — F41.9 ANXIETY AND DEPRESSION: ICD-10-CM

## 2023-03-27 RX ORDER — VILAZODONE HYDROCHLORIDE 20 MG/1
TABLET ORAL
Qty: 90 TABLET | Refills: 0 | Status: SHIPPED | OUTPATIENT
Start: 2023-03-27

## 2023-04-04 NOTE — SIGNIFICANT NOTE
Education provided the Patient on the following:    - Nothing to Eat or Drink after MN the night before the procedure    - Avoid red/purple fluids while completing their bowel prep as ordered by physician  -Contact Gastrointerologist office for any questions about specific details regarding colon prep    -You will need to have someone drive you home after your colonoscopy and remain with you for 24 hours after the procedure  - The date of your Surgery, you may have one visitor at bedside or within 10-15 minutes of [facility]  -Please wear warm socks when you arrive for your colonoscopy  -Remove all jewelry and leave any valuables before arriving the day of your procedure (all will have to be removed before leaving preop)  -You will need to arrive at 0600 on 4/6/23 for your colonoscopy    -Feel free to contact us at: [phone] with any additional questions/concerns

## 2023-04-06 ENCOUNTER — HOSPITAL ENCOUNTER (OUTPATIENT)
Facility: SURGERY CENTER | Age: 28
Setting detail: HOSPITAL OUTPATIENT SURGERY
Discharge: HOME | End: 2023-04-06
Attending: INTERNAL MEDICINE | Admitting: INTERNAL MEDICINE
Payer: COMMERCIAL

## 2023-04-06 ENCOUNTER — ANESTHESIA (OUTPATIENT)
Dept: SURGERY | Facility: SURGERY CENTER | Age: 28
End: 2023-04-06
Payer: COMMERCIAL

## 2023-04-06 ENCOUNTER — ANESTHESIA EVENT (OUTPATIENT)
Dept: SURGERY | Facility: SURGERY CENTER | Age: 28
End: 2023-04-06
Payer: COMMERCIAL

## 2023-04-06 VITALS
WEIGHT: 176 LBS | SYSTOLIC BLOOD PRESSURE: 104 MMHG | TEMPERATURE: 97.7 F | HEIGHT: 65 IN | DIASTOLIC BLOOD PRESSURE: 83 MMHG | HEART RATE: 71 BPM | RESPIRATION RATE: 20 BRPM | OXYGEN SATURATION: 100 % | BODY MASS INDEX: 29.32 KG/M2

## 2023-04-06 DIAGNOSIS — R19.7 DIARRHEA, UNSPECIFIED TYPE: ICD-10-CM

## 2023-04-06 LAB
B-HCG UR QL: NEGATIVE
EXPIRATION DATE: NORMAL
INTERNAL NEGATIVE CONTROL: NORMAL
INTERNAL POSITIVE CONTROL: NORMAL
Lab: NORMAL

## 2023-04-06 PROCEDURE — 45380 COLONOSCOPY AND BIOPSY: CPT | Performed by: INTERNAL MEDICINE

## 2023-04-06 PROCEDURE — 88305 TISSUE EXAM BY PATHOLOGIST: CPT | Performed by: INTERNAL MEDICINE

## 2023-04-06 PROCEDURE — 81025 URINE PREGNANCY TEST: CPT

## 2023-04-06 PROCEDURE — 25010000002 PROPOFOL 10 MG/ML EMULSION: Performed by: NURSE ANESTHETIST, CERTIFIED REGISTERED

## 2023-04-06 RX ORDER — SODIUM CHLORIDE 0.9 % (FLUSH) 0.9 %
10 SYRINGE (ML) INJECTION AS NEEDED
Status: DISCONTINUED | OUTPATIENT
Start: 2023-04-06 | End: 2023-04-06 | Stop reason: HOSPADM

## 2023-04-06 RX ORDER — SODIUM CHLORIDE 0.9 % (FLUSH) 0.9 %
3 SYRINGE (ML) INJECTION EVERY 12 HOURS SCHEDULED
Status: DISCONTINUED | OUTPATIENT
Start: 2023-04-06 | End: 2023-04-06 | Stop reason: HOSPADM

## 2023-04-06 RX ORDER — SODIUM CHLORIDE, SODIUM LACTATE, POTASSIUM CHLORIDE, CALCIUM CHLORIDE 600; 310; 30; 20 MG/100ML; MG/100ML; MG/100ML; MG/100ML
30 INJECTION, SOLUTION INTRAVENOUS CONTINUOUS PRN
Status: DISCONTINUED | OUTPATIENT
Start: 2023-04-06 | End: 2023-04-06 | Stop reason: HOSPADM

## 2023-04-06 RX ORDER — DROPERIDOL 2.5 MG/ML
0.62 INJECTION, SOLUTION INTRAMUSCULAR; INTRAVENOUS
Status: DISCONTINUED | OUTPATIENT
Start: 2023-04-06 | End: 2023-04-06 | Stop reason: HOSPADM

## 2023-04-06 RX ORDER — SODIUM CHLORIDE, SODIUM LACTATE, POTASSIUM CHLORIDE, CALCIUM CHLORIDE 600; 310; 30; 20 MG/100ML; MG/100ML; MG/100ML; MG/100ML
1000 INJECTION, SOLUTION INTRAVENOUS CONTINUOUS
Status: DISCONTINUED | OUTPATIENT
Start: 2023-04-06 | End: 2023-04-06 | Stop reason: HOSPADM

## 2023-04-06 RX ORDER — PROMETHAZINE HYDROCHLORIDE 12.5 MG/1
25 TABLET ORAL ONCE AS NEEDED
Status: DISCONTINUED | OUTPATIENT
Start: 2023-04-06 | End: 2023-04-06 | Stop reason: HOSPADM

## 2023-04-06 RX ORDER — LIDOCAINE HYDROCHLORIDE 10 MG/ML
0.5 INJECTION, SOLUTION INFILTRATION; PERINEURAL ONCE AS NEEDED
Status: DISCONTINUED | OUTPATIENT
Start: 2023-04-06 | End: 2023-04-06 | Stop reason: HOSPADM

## 2023-04-06 RX ORDER — LIDOCAINE HYDROCHLORIDE 20 MG/ML
INJECTION, SOLUTION INFILTRATION; PERINEURAL AS NEEDED
Status: DISCONTINUED | OUTPATIENT
Start: 2023-04-06 | End: 2023-04-06 | Stop reason: SURG

## 2023-04-06 RX ORDER — ONDANSETRON 2 MG/ML
4 INJECTION INTRAMUSCULAR; INTRAVENOUS ONCE AS NEEDED
Status: DISCONTINUED | OUTPATIENT
Start: 2023-04-06 | End: 2023-04-06 | Stop reason: HOSPADM

## 2023-04-06 RX ORDER — FLUMAZENIL 0.1 MG/ML
0.2 INJECTION INTRAVENOUS AS NEEDED
Status: DISCONTINUED | OUTPATIENT
Start: 2023-04-06 | End: 2023-04-06 | Stop reason: HOSPADM

## 2023-04-06 RX ORDER — NALOXONE HCL 0.4 MG/ML
0.2 VIAL (ML) INJECTION AS NEEDED
Status: DISCONTINUED | OUTPATIENT
Start: 2023-04-06 | End: 2023-04-06 | Stop reason: HOSPADM

## 2023-04-06 RX ORDER — FENTANYL CITRATE 50 UG/ML
50 INJECTION, SOLUTION INTRAMUSCULAR; INTRAVENOUS
Status: DISCONTINUED | OUTPATIENT
Start: 2023-04-06 | End: 2023-04-06 | Stop reason: HOSPADM

## 2023-04-06 RX ORDER — DIPHENHYDRAMINE HYDROCHLORIDE 50 MG/ML
12.5 INJECTION INTRAMUSCULAR; INTRAVENOUS
Status: DISCONTINUED | OUTPATIENT
Start: 2023-04-06 | End: 2023-04-06 | Stop reason: HOSPADM

## 2023-04-06 RX ORDER — MAGNESIUM HYDROXIDE 1200 MG/15ML
LIQUID ORAL AS NEEDED
Status: DISCONTINUED | OUTPATIENT
Start: 2023-04-06 | End: 2023-04-06 | Stop reason: HOSPADM

## 2023-04-06 RX ORDER — PROMETHAZINE HYDROCHLORIDE 25 MG/1
25 SUPPOSITORY RECTAL ONCE AS NEEDED
Status: DISCONTINUED | OUTPATIENT
Start: 2023-04-06 | End: 2023-04-06 | Stop reason: HOSPADM

## 2023-04-06 RX ADMIN — LIDOCAINE HYDROCHLORIDE 25 MG: 20 INJECTION, SOLUTION INFILTRATION; PERINEURAL at 07:29

## 2023-04-06 RX ADMIN — SODIUM CHLORIDE, POTASSIUM CHLORIDE, SODIUM LACTATE AND CALCIUM CHLORIDE 1000 ML: 600; 310; 30; 20 INJECTION, SOLUTION INTRAVENOUS at 06:38

## 2023-04-06 RX ADMIN — PROPOFOL 200 MCG/KG/MIN: 10 INJECTION, EMULSION INTRAVENOUS at 07:29

## 2023-04-06 NOTE — ANESTHESIA POSTPROCEDURE EVALUATION
"Patient: Estephania Pathak    Procedure Summary     Date: 04/06/23 Room / Location: [facility]    Anesthesia Start: 0727 Anesthesia Stop: 0747    Procedure: COLONOSCOPY TO CECUM WITH COLD BIOPSIES Diagnosis:       Diarrhea, unspecified type      (Diarrhea, unspecified type [R19.7])    Surgeons: Abdulaziz Donaldson MD Provider: Danny Randall DO    Anesthesia Type: MAC ASA Status: 2          Anesthesia Type: MAC    Vitals  Vitals Value Taken Time   /83 04/06/23 0802   Temp 36.5 °C (97.7 °F) 04/06/23 0747   Pulse 71 04/06/23 0812   Resp 20 04/06/23 0812   SpO2 100 % 04/06/23 0812           Post Anesthesia Care and Evaluation    Patient location during evaluation: bedside  Patient participation: complete - patient participated  Level of consciousness: awake and alert  Pain management: adequate    Airway patency: patent  Anesthetic complications: No anesthetic complications  PONV Status: controlled  Cardiovascular status: acceptable and hemodynamically stable  Respiratory status: acceptable, spontaneous ventilation and nonlabored ventilation  Hydration status: acceptable    Comments: /83   Pulse 71   Temp 36.5 °C (97.7 °F) (Temporal)   Resp 20   Ht 165.1 cm (65\")   Wt 79.8 kg (176 lb)   LMP 03/21/2023   SpO2 100%   BMI 29.29 kg/m²         "

## 2023-04-06 NOTE — ANESTHESIA PREPROCEDURE EVALUATION
Anesthesia Evaluation     Patient summary reviewed   no history of anesthetic complications:  NPO Solid Status: > 8 hours  NPO Liquid Status: > 2 hours           Airway   Mallampati: II  TM distance: >3 FB  Neck ROM: full  No difficulty expected  Dental    (+) partials    Pulmonary     breath sounds clear to auscultation  (+) a smoker Current,   (-) shortness of breath, recent URI  Cardiovascular   Exercise tolerance: good (4-7 METS)    Rhythm: regular  Rate: normal    (-) past MI, dysrhythmias, angina      Neuro/Psych  (+) psychiatric history Depression and ADHD,    (-) seizures, CVA  GI/Hepatic/Renal/Endo    (+)   hepatitis (h/o 3yr ago) C, renal disease (h/o) stones,   (-) diabetes    ROS Comment: Diarrhea    Musculoskeletal     (-) neck stiffness  Abdominal    Substance History   (+) drug use (h/o heroin use 3yrs ago; marijuana, pain pill hx)      Comment: prvs methadone tx for pain pill hx   OB/GYN          Other                      Anesthesia Plan    ASA 2     MAC     (MAC anesthesia discussed with patient and/or patient representative. Risks (including but not limited to intra-op awareness), benefits, and alternatives were discussed. Understanding was voiced with an agreement to proceed with a MAC technique and General as a backup option. )    Anesthetic plan, risks, benefits, and alternatives have been provided, discussed and informed consent has been obtained with: patient.    Plan discussed with CRNA.        CODE STATUS:

## 2023-04-06 NOTE — H&P
No chief complaint on file.      HPI  Patient today for a colonoscopy due to chronic diarrhea.         Problem List:    Patient Active Problem List   Diagnosis   • Tobacco use   • Methadone use   • External hemorrhoids   • Hepatitis C   • Drug use   • Current every day smoker   • Polysubstance abuse   • Encounter for long-term methadone use for opiate dependence   • Mild episode of recurrent major depressive disorder   • Tobacco dependence syndrome   • Diarrhea       Medical History:    Past Medical History:   Diagnosis Date   • Depression    • Diarrhea    • Hepatitis-C     states not active, never had treatment.     • Herpes genitalia    • History of ADHD     AGE 6 TO 12,  TOOK PRESCRIPTION.   • History of vertebral fracture     L5. no surgery, brace and PT   • Hx of drug abuse    • Kidney stone 12/25/2018   • Opiate addiction         Social History:    Social History     Socioeconomic History   • Marital status: Single   • Number of children: 0   Tobacco Use   • Smoking status: Every Day     Packs/day: 0.50     Years: 10.00     Pack years: 5.00     Types: Cigarettes   • Smokeless tobacco: Never   • Tobacco comments:     not  interested currently-trying to quit on own   Vaping Use   • Vaping Use: Some days   • Substances: CBD   Substance and Sexual Activity   • Alcohol use: Yes     Comment: 3x per week   • Drug use: Yes     Types: Marijuana     Comment: heroin daily, over 3 years ago, pain pills-occasionally, a month for marijuana   • Sexual activity: Not Currently     Partners: Male     Birth control/protection: Implant     Comment: Nexplanon       Family History:   Family History   Problem Relation Age of Onset   • Endometriosis Mother    • Malig Hyperthermia Neg Hx    • Breast cancer Neg Hx    • Uterine cancer Neg Hx    • Ovarian cancer Neg Hx    • Colon cancer Neg Hx    • Congenital heart disease Neg Hx    • Colon polyps Neg Hx    • Crohn's disease Neg Hx    • Irritable bowel syndrome Neg Hx    • Ulcerative  colitis Neg Hx        Surgical History:   Past Surgical History:   Procedure Laterality Date   • D & C WITH SUCTION N/A 3/15/2019    Procedure: DILATATION AND CURETTAGE WITH SUCTION;  Surgeon: Megan Campos MD;  Location: [facility];  Service: Obstetrics/Gynecology   • KIDNEY STONE SURGERY     • WISDOM TOOTH EXTRACTION           Current Facility-Administered Medications:   •  lactated ringers infusion 1,000 mL, 1,000 mL, Intravenous, Continuous, Abdulaziz Donaldson MD, Last Rate: 25 mL/hr at 04/06/23 0705, Currently Infusing at 04/06/23 0705  •  lactated ringers infusion, 30 mL/hr, Intravenous, Continuous PRN, Yocasta Francisco APRN  •  lidocaine (XYLOCAINE) 1 % injection 0.5 mL, 0.5 mL, Intradermal, Once PRN, Abdulaziz Donaldson MD  •  sodium chloride 0.9 % flush 10 mL, 10 mL, Intravenous, PRN, Yocasta Francisco APRN  •  sodium chloride 0.9 % flush 10 mL, 10 mL, Intravenous, PRN, Abdulaziz Donaldson MD  •  sodium chloride 0.9 % flush 3 mL, 3 mL, Intravenous, Q12H, Yocasta Francisco APRN    Allergies: No Known Allergies     The following portions of the patient's history were reviewed by me and updated as appropriate: review of systems, allergies, current medications, past family history, past medical history, past social history, past surgical history and problem list.    Vitals:    04/06/23 0636   BP: 114/75   Pulse: 77   Resp: 16   Temp: 97 °F (36.1 °C)   SpO2: 95%       PHYSICAL EXAM:    CONSTITUTIONAL:  today's vital signs reviewed by me  GASTROINTESTINAL: abdomen is soft nontender nondistended with normal active bowel sounds, no masses are appreciated    Assessment/ Plan  We will proceed today with colonoscopy with biopsy.    Risks and benefits as well as alternatives to endoscopic evaluation were explained to the patient and they voiced understanding and wish to proceed.  These risks include but are not limited to the risk of bleeding, perforation, adverse reaction to sedation, and missed lesions.   The patient was given the opportunity to ask questions prior to the endoscopic procedure.

## 2023-04-07 LAB
LAB AP CASE REPORT: NORMAL
LAB AP DIAGNOSIS COMMENT: NORMAL
PATH REPORT.FINAL DX SPEC: NORMAL
PATH REPORT.GROSS SPEC: NORMAL

## 2023-04-10 DIAGNOSIS — K52.832 LYMPHOCYTIC COLITIS: Primary | ICD-10-CM

## 2023-04-10 RX ORDER — BUDESONIDE 3 MG/1
9 CAPSULE, COATED PELLETS ORAL DAILY
Qty: 90 CAPSULE | Refills: 2 | Status: SHIPPED | OUTPATIENT
Start: 2023-04-10 | End: 2023-07-09

## 2023-04-21 DIAGNOSIS — R25.2 LEG CRAMPS: ICD-10-CM

## 2023-04-21 RX ORDER — CYCLOBENZAPRINE HCL 10 MG
TABLET ORAL
Qty: 30 TABLET | Refills: 1 | Status: SHIPPED | OUTPATIENT
Start: 2023-04-21

## 2023-04-21 RX ORDER — PRENATAL VIT/IRON FUM/FOLIC AC 27MG-0.8MG
TABLET ORAL
Qty: 90 TABLET | Refills: 0 | Status: SHIPPED | OUTPATIENT
Start: 2023-04-21

## 2023-05-08 ENCOUNTER — OFFICE VISIT (OUTPATIENT)
Dept: FAMILY MEDICINE CLINIC | Facility: CLINIC | Age: 28
End: 2023-05-08
Payer: COMMERCIAL

## 2023-05-08 VITALS
OXYGEN SATURATION: 97 % | HEART RATE: 81 BPM | WEIGHT: 183.8 LBS | BODY MASS INDEX: 30.62 KG/M2 | HEIGHT: 65 IN | SYSTOLIC BLOOD PRESSURE: 118 MMHG | DIASTOLIC BLOOD PRESSURE: 74 MMHG | TEMPERATURE: 98.6 F

## 2023-05-08 DIAGNOSIS — F41.9 ANXIETY AND DEPRESSION: ICD-10-CM

## 2023-05-08 DIAGNOSIS — B00.9 HERPES SIMPLEX TYPE 2 INFECTION: ICD-10-CM

## 2023-05-08 DIAGNOSIS — K52.832 LYMPHOCYTIC COLITIS: ICD-10-CM

## 2023-05-08 DIAGNOSIS — Z00.01 ENCOUNTER FOR GENERAL ADULT MEDICAL EXAMINATION WITH ABNORMAL FINDINGS: Primary | ICD-10-CM

## 2023-05-08 DIAGNOSIS — F32.A ANXIETY AND DEPRESSION: ICD-10-CM

## 2023-05-08 PROBLEM — F11.21: Status: ACTIVE | Noted: 2020-02-28

## 2023-05-08 PROBLEM — B19.20 HEPATITIS C: Status: ACTIVE | Noted: 2019-12-26

## 2023-05-08 PROBLEM — F33.1 RECURRENT MAJOR DEPRESSIVE EPISODES, MODERATE: Status: ACTIVE | Noted: 2021-05-25

## 2023-05-08 PROBLEM — K64.4 EXTERNAL HEMORRHOIDS: Status: ACTIVE | Noted: 2020-02-14

## 2023-05-08 PROBLEM — F19.10 HIGH RISK PREGNANCY DUE TO MATERNAL DRUG ABUSE IN SECOND TRIMESTER: Status: ACTIVE | Noted: 2020-02-07

## 2023-05-08 PROBLEM — O99.322 HIGH RISK PREGNANCY DUE TO MATERNAL DRUG ABUSE IN SECOND TRIMESTER: Status: ACTIVE | Noted: 2020-02-07

## 2023-05-08 RX ORDER — PRENATAL VIT/IRON FUM/FOLIC AC 27MG-0.8MG
1 TABLET ORAL DAILY
Qty: 90 TABLET | Refills: 3 | Status: SHIPPED | OUTPATIENT
Start: 2023-05-08

## 2023-05-08 RX ORDER — BUDESONIDE 3 MG/1
9 CAPSULE, COATED PELLETS ORAL DAILY
Qty: 90 CAPSULE | Refills: 2
Start: 2023-05-08 | End: 2023-08-06

## 2023-05-08 RX ORDER — VILAZODONE HYDROCHLORIDE 20 MG/1
20 TABLET ORAL DAILY
Qty: 90 TABLET | Refills: 1 | Status: SHIPPED | OUTPATIENT
Start: 2023-05-08
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
## 2023-05-15 ENCOUNTER — OFFICE VISIT (OUTPATIENT)
Dept: GASTROENTEROLOGY | Facility: CLINIC | Age: 28
End: 2023-05-15
Payer: COMMERCIAL

## 2023-05-15 VITALS
SYSTOLIC BLOOD PRESSURE: 100 MMHG | HEART RATE: 83 BPM | HEIGHT: 65 IN | OXYGEN SATURATION: 98 % | DIASTOLIC BLOOD PRESSURE: 86 MMHG | BODY MASS INDEX: 31.14 KG/M2 | WEIGHT: 186.9 LBS | TEMPERATURE: 98.7 F

## 2023-05-15 DIAGNOSIS — K52.832 LYMPHOCYTIC COLITIS: Primary | ICD-10-CM

## 2023-05-15 PROCEDURE — 1160F RVW MEDS BY RX/DR IN RCRD: CPT

## 2023-05-15 PROCEDURE — 99213 OFFICE O/P EST LOW 20 MIN: CPT

## 2023-05-15 PROCEDURE — 1159F MED LIST DOCD IN RCRD: CPT

## 2023-05-15 NOTE — PATIENT INSTRUCTIONS
For Microscopic colitis:    Complete additional 4 weeks of 3 capsules of Entocort per day followed by 14 days of 2 capsules/day, followed by 14 days of taking 1 capsule/day.    I would like to see you in 8 weeks to assess symptoms at that time.  If it anytime while reducing Entocort dosing diarrhea increases in frequency please let me know and we will provide additional recommendations based on symptoms.

## 2023-05-15 NOTE — PROGRESS NOTES
"Chief Complaint   Patient presents with   • Follow-up     Microscopic colitis           History of Present Illness    Patient is a 27 y.o. who presents to the office for follow up evaluation.  Last in office visit was on  2/20/23 . Patient has a significant past medical history of opiate use with cessation of methadone prior to symptom onset in 2022, hepatitis C-denies previous treatment due to an active infection at time of diagnosis.    On 4/6/2023 patient underwent colonoscopy evaluation with Dr. Donaldson for further evaluation of persistent diarrhea and was remarkable for:    - Small nonbleeding internal hemorrhoids, otherwise unremarkable exam    Random biopsies obtained throughout the colon revealed increased intraepithelial lymphocytes in both the right and left colon consistent with lymphocytic colitis.  On 4/7/2023 patient was started on Entocort 9 mg daily with recommendations to follow-up in office in 1 month.    She presents today for follow-up evaluation after starting Entocort regimen.  She denies upper GI concerns including heartburn, nausea, vomiting, or dysphagia.    Bowel habits are described as occurring 1-3 times per day with a gradual improvement in stool consistency without visible melena or hematochezia.  She denies taking Lomotil at this time.    Patient denies known family history of colon polyps, colon cancer, or IBD.       Result Review :         Office Visit with Yocasta Francisco APRN (02/20/2023)  COLONOSCOPY (04/06/2023 07:14)  Office Visit with Yocasta Francisco APRN (02/20/2023)  Tissue Pathology Exam (04/06/2023 07:39)  Tissue Pathology Exam (04/06/2023 07:39)      Vital Signs:   /86   Pulse 83   Temp 98.7 °F (37.1 °C)   Ht 165.1 cm (65\")   Wt 84.8 kg (186 lb 14.4 oz)   SpO2 98%   BMI 31.10 kg/m²     Body mass index is 31.1 kg/m².     Physical Exam      Assessment and Plan    Diagnoses and all orders for this visit:    1. Lymphocytic colitis (Primary)     "       Discussion:    Patient presents today for follow-up discussion after undergoing colonoscopy evaluation with Dr. Donaldson.  Long discussion today regarding diagnosis of microscopic colitis.      Written information was given regarding microscopic colitis.     Our goal is to adjust dose of budesonide based on response and symptoms of active disease (?3 stools daily or ?1 watery stool daily).     Typically treatment requires 6 to 8 weeks or longer of budesonide 9 mg daily and gradual tapering for patients in clinical remission with less than 3 stools daily and no watery stools.  We then taper to 6 mg for 2 weeks followed by 3 mg for another 2 weeks and if patient remains in remission we discontinue therapy at that time.     Overall from a GI standpoint she is doing well and verbalizes significant improvement in symptoms since starting Entocort therapy.  She is agreeable to proceeding with budesonide taper with written instructions provided on how to do so.    She will follow-up in the office after completing budesonide taper to assess symptoms at that time or contact her office sooner if stool frequency increases while completing budesonide taper.  Further recommendations to be made pending results of the above work-up and clinical course.    Patient is agreeable to the outlined above treatment plan.  Verbalizes understanding and will contact office for any new or worsening concerns.  All questions answered and support provided.        Patient Instructions   For Microscopic colitis:    Complete additional 4 weeks of 3 capsules of Entocort per day followed by 14 days of 2 capsules/day, followed by 14 days of taking 1 capsule/day.    I would like to see you in 8 weeks to assess symptoms at that time.  If it anytime while reducing Entocort dosing diarrhea increases in frequency please let me know and we will provide additional recommendations based on symptoms.          EMR Dragon/Transcription Disclaimer:  This  document has been Dictated utilizing Dragon dictation.

## 2023-06-05 ENCOUNTER — OFFICE VISIT (OUTPATIENT)
Dept: FAMILY MEDICINE CLINIC | Facility: CLINIC | Age: 28
End: 2023-06-05
Payer: COMMERCIAL

## 2023-06-05 VITALS
BODY MASS INDEX: 31.29 KG/M2 | OXYGEN SATURATION: 96 % | SYSTOLIC BLOOD PRESSURE: 116 MMHG | WEIGHT: 187.8 LBS | DIASTOLIC BLOOD PRESSURE: 74 MMHG | TEMPERATURE: 98.6 F | HEART RATE: 99 BPM | HEIGHT: 65 IN

## 2023-06-05 DIAGNOSIS — L74.0 HEAT RASH: ICD-10-CM

## 2023-06-05 DIAGNOSIS — M54.50 LUMBAR BACK PAIN: Primary | ICD-10-CM

## 2023-06-05 DIAGNOSIS — F41.9 ANXIETY AND DEPRESSION: ICD-10-CM

## 2023-06-05 DIAGNOSIS — F32.A ANXIETY AND DEPRESSION: ICD-10-CM

## 2023-06-05 PROCEDURE — 99214 OFFICE O/P EST MOD 30 MIN: CPT | Performed by: NURSE PRACTITIONER

## 2023-06-05 RX ORDER — DICLOFENAC SODIUM 75 MG/1
75 TABLET, DELAYED RELEASE ORAL 2 TIMES DAILY
Qty: 60 TABLET | Refills: 0 | Status: SHIPPED | OUTPATIENT
Start: 2023-06-05

## 2023-06-05 NOTE — PROGRESS NOTES
"Chief Complaint  Anxiety, Depression, Rash (On stomach/hip), and Back Pain    Subjective        Estephania Pathak presents to [facility]  History of Present Illness    Patient is here today for follow-up on anxiety and depression.  She also has 2 new complaints to me today.  She reports the rash on her stomach and hip and wanted to follow-up on back pain.    Anxiety, mood disorder currently on Vraylar 3 mg and Viibryd 20 mg-hasn't really noticed a difference with the medication.     Reports she doesn't have energy, overeating.   Not sure if depressed.  Anxiety feels ok.      Back pain-low back pain that started 2 days ago.  She does take Flexeril as needed for leg cramps.  Not helping much with pain. Did go to chiropractor today.  Hasn't had xray recently   Denies any numbness or tingling in either leg    Rash-on stomach and hip first noticed a day or two ago.  Is itching.  Denies any pain.     No drainage.  Soft to touch.        Objective   Vital Signs:  /74   Pulse 99   Temp 98.6 °F (37 °C)   Ht 165.1 cm (65\")   Wt 85.2 kg (187 lb 12.8 oz)   SpO2 96%   BMI 31.25 kg/m²   Estimated body mass index is 31.25 kg/m² as calculated from the following:    Height as of this encounter: 165.1 cm (65\").    Weight as of this encounter: 85.2 kg (187 lb 12.8 oz).             Physical Exam  Constitutional:       Appearance: Normal appearance.   Cardiovascular:      Rate and Rhythm: Normal rate and regular rhythm.      Pulses: Normal pulses.   Pulmonary:      Effort: Pulmonary effort is normal.      Breath sounds: Normal breath sounds.   Skin:     General: Skin is warm and dry.      Findings: Rash (heat rash right abdomen) present.   Neurological:      Mental Status: She is alert and oriented to person, place, and time.   Psychiatric:         Mood and Affect: Mood normal.         Behavior: Behavior normal.         Thought Content: Thought content normal.         Judgment: Judgment normal.    "   Result Review :                   Assessment and Plan   Diagnoses and all orders for this visit:    1. Lumbar back pain (Primary)  -     XR Spine Lumbar 2 or 3 View; Future    2. Anxiety and depression    3. Heat rash    Other orders  -     Cariprazine HCl (Vraylar) 4.5 MG capsule capsule; Take 1 capsule by mouth Daily.  Dispense: 30 capsule; Refill: 2  -     triamcinolone (KENALOG) 0.1 % ointment; Apply 1 application topically to the appropriate area as directed 2 (Two) Times a Day.  Dispense: 80 g; Refill: 0  -     diclofenac (VOLTAREN) 75 MG EC tablet; Take 1 tablet by mouth 2 (Two) Times a Day.  Dispense: 60 tablet; Refill: 0    Anxiety and depression-we will continue Viibryd.  Increase Vraylar to 4.5 mg.  If any issues notify provider.  Follow-up in 4 weeks for medication management    Lumbar back pain-we will proceed with x-ray.  Use diclofenac as needed.  May continue Flexeril.  Do not take with any additional ibuprofen or NSAIDs    Heat rash use triamcinolone as needed.  If no resolution follow-up as needed-             Follow Up   Return in about 4 weeks (around 7/3/2023).  Patient was given instructions and counseling regarding her condition or for health maintenance advice. Please see specific information pulled into the AVS if appropriate.

## 2023-06-08 ENCOUNTER — TELEPHONE (OUTPATIENT)
Dept: FAMILY MEDICINE CLINIC | Facility: CLINIC | Age: 28
End: 2023-06-08
Payer: COMMERCIAL

## 2023-06-08 DIAGNOSIS — M54.50 LUMBAR BACK PAIN: Primary | ICD-10-CM

## 2023-06-08 NOTE — TELEPHONE ENCOUNTER
Caller: Estephania Pathak    Relationship: Self    Best call back number: [phone]     What specialty or service is being requested: PHYSICAL THERAPY    What is the provider, practice or medical service name: ANY    What is the office location: [facility]    What is the office phone number: ANY

## 2023-07-24 DIAGNOSIS — K52.832 LYMPHOCYTIC COLITIS: ICD-10-CM

## 2023-07-24 RX ORDER — BUDESONIDE 3 MG/1
6 CAPSULE, COATED PELLETS ORAL EVERY MORNING
Qty: 60 CAPSULE | Refills: 0 | OUTPATIENT
Start: 2023-07-24 | End: 2023-08-23

## 2023-08-11 DIAGNOSIS — K52.832 LYMPHOCYTIC COLITIS: ICD-10-CM

## 2023-08-14 RX ORDER — BUDESONIDE 3 MG/1
3 CAPSULE, COATED PELLETS ORAL EVERY MORNING
Qty: 14 CAPSULE | Refills: 0 | Status: SHIPPED | OUTPATIENT
Start: 2023-08-14 | End: 2023-09-13

## 2023-08-15 ENCOUNTER — OFFICE VISIT (OUTPATIENT)
Dept: OBSTETRICS AND GYNECOLOGY | Age: 28
End: 2023-08-15
Payer: COMMERCIAL

## 2023-08-15 VITALS — WEIGHT: 194.8 LBS | HEIGHT: 65 IN | BODY MASS INDEX: 32.46 KG/M2

## 2023-08-15 DIAGNOSIS — Z30.46 ENCOUNTER FOR REMOVAL AND REINSERTION OF NEXPLANON: Primary | ICD-10-CM

## 2023-08-15 RX ORDER — CARIPRAZINE 1.5 MG/1
CAPSULE, GELATIN COATED ORAL
COMMUNITY
Start: 2023-08-14

## 2023-08-15 RX ORDER — VILAZODONE HYDROCHLORIDE 40 MG/1
1 TABLET ORAL DAILY
COMMUNITY
Start: 2023-08-02

## 2023-08-15 NOTE — PROGRESS NOTES
PROCEDURE NOTE   Nexplanon Removal and immediate insertion    Applying Universal Protocol I properly identified the patient and sought her signature with informed consent. Plans for future Contraception were discussed .  The patient chose to use Nexplanon after nexplanon removal.    The area was prepped with Betadine,  3 cc's of  local 1% xylocaine with epinephrine was injected sub-cutaneous with a 25 ga needle.  After sufficient time for the anesthetic to work a  #11 SURGICAL knife was used to make a 4 mm incision over the underlying device. Using a hemostat  The device was  successfully removed.    The new nexplanon device was inserted using the existing incision. The site was reapproximated using steristrips. A dressing of 4x4 gauze, bandaid and stretchwrap was placed.    Instructions for wound care and follow up were discussed.    She tolerated the procedure well.    Olimpia Franco MD  8/15/2023  16:29 EDT

## 2023-08-21 DIAGNOSIS — R25.2 LEG CRAMPS: ICD-10-CM

## 2023-08-21 RX ORDER — CYCLOBENZAPRINE HCL 10 MG
TABLET ORAL
Qty: 30 TABLET | Refills: 0 | Status: SHIPPED | OUTPATIENT
Start: 2023-08-21

## 2023-09-05 ENCOUNTER — OFFICE VISIT (OUTPATIENT)
Dept: OBSTETRICS AND GYNECOLOGY | Age: 28
End: 2023-09-05
Payer: COMMERCIAL

## 2023-09-05 VITALS
BODY MASS INDEX: 31.65 KG/M2 | WEIGHT: 190 LBS | DIASTOLIC BLOOD PRESSURE: 70 MMHG | HEIGHT: 65 IN | SYSTOLIC BLOOD PRESSURE: 122 MMHG

## 2023-09-05 DIAGNOSIS — Z01.419 ENCOUNTER FOR GYNECOLOGICAL EXAMINATION WITHOUT ABNORMAL FINDING: Primary | ICD-10-CM

## 2023-09-05 DIAGNOSIS — Z12.4 SCREENING FOR CERVICAL CANCER: ICD-10-CM

## 2023-09-05 RX ORDER — ERGOCALCIFEROL 1.25 MG/1
50000 CAPSULE ORAL WEEKLY
COMMUNITY
Start: 2023-08-16

## 2023-09-05 RX ORDER — MAGNESIUM GLUCONATE 27 MG(500)
2 TABLET ORAL DAILY
COMMUNITY
Start: 2023-08-16

## 2023-09-05 NOTE — PROGRESS NOTES
Routine Annual Visit    2023    Patient: Estephania Pathak          MR#:[id number]      Chief Complaint   Patient presents with    Annual Exam     AE today, Last AE 2022, Last pap 2021 nml, Colonoscopy 2023, Nexplanon 08/15/2023, No problems today       History of Present Illness    28 y.o. female  who presents for annual exam.   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      Patient's last menstrual period was 2023 (exact date).  Obstetric History:  OB History          2    Para   1    Term   1       0    AB   1    Living   1         SAB   1    IAB   0    Ectopic   0    Molar   0    Multiple        Live Births   1          Obstetric Comments   20 - 33-6 weeks - EFW 54%, AC 74% - [facility]                Menstrual History:     Patient's last menstrual period was 2023 (exact date).       ________________________________________  Patient Active Problem List   Diagnosis    Tobacco use    Methadone use    External hemorrhoids    Hepatitis C    Drug use    Current every day smoker    Polysubstance abuse    Encounter for long-term methadone use for opiate dependence    Mild episode of recurrent major depressive disorder    Tobacco dependence syndrome    Diarrhea    Heroin dependence    Moderate opioid dependence in early remission on maintenance therapy    Opioid dependence    External hemorrhoids    Hepatitis C    High risk pregnancy due to maternal drug abuse in second trimester    Recurrent major depressive episodes, moderate    Lymphocytic colitis       Past Medical History:   Diagnosis Date    Depression     Diarrhea     Hepatitis-C     states not active, never had treatment.      Herpes genitalia     History of ADHD     AGE 6 TO 12,  TOOK PRESCRIPTION.    History of  "vertebral fracture     L5. no surgery, brace and PT    Hx of drug abuse     Kidney stone 12/25/2018    Opiate addiction        Family History   Problem Relation Age of Onset    No Known Problems Father     Endometriosis Mother     Malig Hyperthermia Neg Hx     Breast cancer Neg Hx     Uterine cancer Neg Hx     Ovarian cancer Neg Hx     Colon cancer Neg Hx     Congenital heart disease Neg Hx     Colon polyps Neg Hx     Crohn's disease Neg Hx     Irritable bowel syndrome Neg Hx     Ulcerative colitis Neg Hx        Past Surgical History:   Procedure Laterality Date    COLONOSCOPY N/A 4/6/2023    Procedure: COLONOSCOPY TO CECUM WITH COLD BIOPSIES;  Surgeon: Abdulaziz Donaldson MD;  Location: [facility] MAIN OR;  Service: Gastroenterology;  Laterality: N/A;  HEMORROIDS    D & C WITH SUCTION N/A 3/15/2019    Procedure: DILATATION AND CURETTAGE WITH SUCTION;  Surgeon: Megan Campos MD;  Location: [facility] MAIN OR;  Service: Obstetrics/Gynecology    KIDNEY STONE SURGERY      WISDOM TOOTH EXTRACTION         Social History     Tobacco Use   Smoking Status Every Day    Packs/day: 0.50    Years: 10.00    Pack years: 5.00    Types: Cigarettes    Passive exposure: Past (two cigarettes today)   Smokeless Tobacco Never   Tobacco Comments    not  interested currently-trying to quit on own       has a current medication list which includes the following prescription(s): budesonide, colestipol, cyclobenzaprine, diclofenac, omega iii epa+dha, prenatal vitamin 27-0.8, vilazodone, vitamin d, and vraylar.  ________________________________________        Objective   Physical Exam    /70   Ht 165.1 cm (65\")   Wt 86.2 kg (190 lb)   LMP 08/30/2023 (Exact Date) Comment: Nexplanon  Breastfeeding No   BMI 31.62 kg/mý    BP Readings from Last 3 Encounters:   09/05/23 122/70   07/21/23 110/70   07/05/23 133/73      Wt Readings from Last 3 Encounters:   09/05/23 86.2 kg (190 lb)   08/15/23 88.4 kg (194 lb 12.8 oz)   07/21/23 88.5 kg " (195 lb 3.2 oz)         BMI: Body mass index is 31.62 kg/mý.       General:   alert, appears stated age, and cooperative   Neck: No thyromegaly or LAD   Heart:: regular rate and rhythm, S1, S2 normal, no murmur, click, rub or gallop   Lungs: normal respiratory effort and auscultation   Abdomen: soft, non-tender, without masses or organomegaly   Breast: inspection negative, no nipple discharge or bleeding, no masses or nodularity palpable   Urethra and bladder: urethral meatus normal; bladder nontender to palpation;   Vulva: normal, Bartholin's, Urethra, Stephens's normal   Vagina: normal mucosa, normal discharge   Cervix: multiparous appearance and no lesions   Uterus: normal size, non-tender, and anteverted   Adnexa: normal adnexa and no mass, fullness, tenderness       Assessment:    normal annual exam     Plan:    Plan     []  Mammogram request made  [x]  PAP done  []  Labs:   []  GC/Chl/TV  []  DEXA scan   []  Referral for colonoscopy:     Doing well with nexplanon    Counseling  [x]  Nutrition  [x]  Physical activity/regular exercise   [x]  Healthy weight  []  Injury prevention  []  Smoking cessation  []  Substance misuse/abuse  [x]  Sexual behavior  []  STD prevention  []  Contraception  []  Dental health  [x]  Mental health  []  Immunization  [x]  Encouraged SBE        Olimpia Franco MD  09/05/2023  15:55 EDT

## 2023-09-08 LAB
CYTOLOGIST CVX/VAG CYTO: NORMAL
CYTOLOGY CVX/VAG DOC CYTO: NORMAL
CYTOLOGY CVX/VAG DOC THIN PREP: NORMAL
DX ICD CODE: NORMAL
HIV 1 & 2 AB SER-IMP: NORMAL
HPV I/H RISK 4 DNA CVX QL PROBE+SIG AMP: NEGATIVE
Lab: NORMAL
OTHER STN SPEC: NORMAL
STAT OF ADQ CVX/VAG CYTO-IMP: NORMAL

## 2023-09-16 DIAGNOSIS — R25.2 LEG CRAMPS: ICD-10-CM

## 2023-09-18 RX ORDER — CYCLOBENZAPRINE HCL 10 MG
TABLET ORAL
Qty: 30 TABLET | Refills: 0 | Status: SHIPPED | OUTPATIENT
Start: 2023-09-18

## 2023-10-09 DIAGNOSIS — K52.832 LYMPHOCYTIC COLITIS: ICD-10-CM

## 2023-10-10 RX ORDER — MONTELUKAST SODIUM 4 MG/1
1 TABLET, CHEWABLE ORAL 2 TIMES DAILY
Qty: 60 TABLET | Refills: 2 | Status: SHIPPED | OUTPATIENT
Start: 2023-10-10

## 2023-10-20 DIAGNOSIS — R25.2 LEG CRAMPS: ICD-10-CM

## 2023-10-20 RX ORDER — CYCLOBENZAPRINE HCL 10 MG
TABLET ORAL
Qty: 30 TABLET | Refills: 0 | Status: SHIPPED | OUTPATIENT
Start: 2023-10-20

## 2023-10-24 DIAGNOSIS — R25.2 LEG CRAMPS: ICD-10-CM

## 2023-10-25 RX ORDER — CYCLOBENZAPRINE HCL 10 MG
TABLET ORAL
Qty: 30 TABLET | Refills: 0 | OUTPATIENT
Start: 2023-10-25

## 2023-10-27 ENCOUNTER — OFFICE VISIT (OUTPATIENT)
Dept: GASTROENTEROLOGY | Facility: CLINIC | Age: 28
End: 2023-10-27
Payer: COMMERCIAL

## 2023-10-27 VITALS
HEART RATE: 76 BPM | OXYGEN SATURATION: 98 % | HEIGHT: 65 IN | TEMPERATURE: 98 F | WEIGHT: 184.8 LBS | BODY MASS INDEX: 30.79 KG/M2 | DIASTOLIC BLOOD PRESSURE: 70 MMHG | SYSTOLIC BLOOD PRESSURE: 102 MMHG

## 2023-10-27 DIAGNOSIS — R19.7 DIARRHEA, UNSPECIFIED TYPE: ICD-10-CM

## 2023-10-27 DIAGNOSIS — K52.832 LYMPHOCYTIC COLITIS: Primary | ICD-10-CM

## 2023-10-27 NOTE — PATIENT INSTRUCTIONS
For Diarrhea:    Can begin taking colestipol once daily at bedtime and if diarrhea returns can begin taking one tablet in the am and one tablet in the pm

## 2023-10-27 NOTE — PROGRESS NOTES
"Chief Complaint   Patient presents with    Follow-up     Lymphocytic colitis           History of Present Illness    Patient is a 28 y.o. who presents to the office for follow up evaluation.  Last in office visit was on  7/21/23 for lymphocytic colitis. Patient has a significant past medical history of opiate use with cessation of methadone prior to symptom onset in 2022, hepatitis C-denies previous treatment due to an active infection at time of diagnosis.     On 4/6/2023 patient underwent colonoscopy evaluation with Dr. Donaldson for further evaluation of persistent diarrhea and was remarkable for:     - Small nonbleeding internal hemorrhoids, otherwise unremarkable exam     Random biopsies obtained throughout the colon revealed increased intraepithelial lymphocytes in both the right and left colon consistent with lymphocytic colitis.     She denies upper GI concerns including heartburn, nausea, vomiting, or dysphagia.    Current bowel habits are described as occurring once daily in the morning without evidence of melena or hematochezia.  However she does report that stool remains a loose despite use of a single colestipol tablet every a.m. states that she is tolerating medication .    Patient denies known family history of colon polyps, colon cancer, or IBD.       Result Review :         Office Visit with Yocasta Francisco APRN (07/21/2023)     Vital Signs:   /70   Pulse 76   Temp 98 °F (36.7 °C)   Ht 165.1 cm (65\")   Wt 83.8 kg (184 lb 12.8 oz)   SpO2 98%   BMI 30.75 kg/m²     Body mass index is 30.75 kg/m².     Physical Exam  Vitals reviewed.   Constitutional:       General: She is not in acute distress.     Appearance: Normal appearance. She is not ill-appearing.   Eyes:      General: No scleral icterus.  Pulmonary:      Effort: Pulmonary effort is normal. No respiratory distress.   Abdominal:      General: Bowel sounds are normal. There is no distension.      Palpations: Abdomen is soft. Abdomen is " not rigid. There is no mass or pulsatile mass.      Tenderness: There is no abdominal tenderness. There is no guarding or rebound.      Hernia: No hernia is present.   Skin:     Coloration: Skin is not jaundiced.   Neurological:      Mental Status: She is alert and oriented to person, place, and time.   Psychiatric:         Thought Content: Thought content normal.         Judgment: Judgment normal.           Assessment and Plan    Diagnoses and all orders for this visit:    1. Lymphocytic colitis (Primary)    2. Diarrhea, unspecified type           Discussion:    Patient presents for management of microscopic colitis.  Overall from a GI standpoint she is doing well on current regimen.  However to help with a.m. loose stools recommend transitioning dosing of colestipol to at bedtime and if loose stools persist or increase in frequency would recommend increasing dosing to 1 tablet before breakfast and 1 tablet at bedtime.  Recommend following up in our office on an annual basis for continued refills or as needed for any new or worsening GI concerns.    Patient is agreeable to the outlined above treatment plan.  Verbalizes understanding and will contact office for any new or worsening concerns.  All questions answered and support provided.        Patient Instructions   For Diarrhea:    Can begin taking colestipol once daily at bedtime and if diarrhea returns can begin taking one tablet in the am and one tablet in the pm         EMR Dragon/Transcription Disclaimer:  This document has been Dictated utilizing Dragon dictation.

## 2023-11-03 DIAGNOSIS — R25.2 LEG CRAMPS: ICD-10-CM

## 2023-11-03 RX ORDER — CYCLOBENZAPRINE HCL 10 MG
TABLET ORAL
Qty: 30 TABLET | Refills: 0 | Status: SHIPPED | OUTPATIENT
Start: 2023-11-03

## 2023-11-11 DIAGNOSIS — R25.2 LEG CRAMPS: ICD-10-CM

## 2023-11-13 RX ORDER — CYCLOBENZAPRINE HCL 10 MG
TABLET ORAL
Qty: 30 TABLET | Refills: 0 | OUTPATIENT
Start: 2023-11-13

## 2023-12-01 DIAGNOSIS — R25.2 LEG CRAMPS: ICD-10-CM

## 2023-12-01 DIAGNOSIS — K52.832 LYMPHOCYTIC COLITIS: ICD-10-CM

## 2023-12-01 RX ORDER — CYCLOBENZAPRINE HCL 10 MG
TABLET ORAL
Qty: 30 TABLET | Refills: 0 | Status: SHIPPED | OUTPATIENT
Start: 2023-12-01

## 2023-12-01 RX ORDER — MONTELUKAST SODIUM 4 MG/1
1 TABLET, CHEWABLE ORAL 2 TIMES DAILY
Qty: 60 TABLET | Refills: 2 | Status: SHIPPED | OUTPATIENT
Start: 2023-12-01

## 2023-12-09 DIAGNOSIS — R25.2 LEG CRAMPS: ICD-10-CM

## 2023-12-11 RX ORDER — CYCLOBENZAPRINE HCL 10 MG
TABLET ORAL
Qty: 30 TABLET | Refills: 0 | OUTPATIENT
Start: 2023-12-11

## 2023-12-29 DIAGNOSIS — R25.2 LEG CRAMPS: ICD-10-CM

## 2023-12-29 RX ORDER — CYCLOBENZAPRINE HCL 10 MG
TABLET ORAL
Qty: 30 TABLET | Refills: 0 | Status: SHIPPED | OUTPATIENT
Start: 2023-12-29

## 2023-12-30 ENCOUNTER — DOCUMENTATION (OUTPATIENT)
Dept: FAMILY MEDICINE CLINIC | Facility: CLINIC | Age: 28
End: 2023-12-30
Payer: COMMERCIAL

## 2023-12-30 RX ORDER — CARIPRAZINE 1.5 MG/1
1.5 CAPSULE, GELATIN COATED ORAL DAILY
Qty: 30 CAPSULE | Refills: 0 | Status: SHIPPED | OUTPATIENT
Start: 2023-12-30 | End: 2024-01-08 | Stop reason: SDUPTHER

## 2023-12-30 RX ORDER — VILAZODONE HYDROCHLORIDE 40 MG/1
40 TABLET ORAL DAILY
Qty: 30 TABLET | Refills: 0 | Status: SHIPPED | OUTPATIENT
Start: 2023-12-30 | End: 2024-01-08 | Stop reason: SDUPTHER

## 2024-01-08 ENCOUNTER — OFFICE VISIT (OUTPATIENT)
Dept: FAMILY MEDICINE CLINIC | Facility: CLINIC | Age: 29
End: 2024-01-08
Payer: COMMERCIAL

## 2024-01-08 VITALS
SYSTOLIC BLOOD PRESSURE: 124 MMHG | HEIGHT: 65 IN | OXYGEN SATURATION: 99 % | BODY MASS INDEX: 32.06 KG/M2 | WEIGHT: 192.4 LBS | HEART RATE: 87 BPM | DIASTOLIC BLOOD PRESSURE: 86 MMHG | TEMPERATURE: 98.6 F

## 2024-01-08 DIAGNOSIS — F41.9 ANXIETY AND DEPRESSION: Primary | ICD-10-CM

## 2024-01-08 DIAGNOSIS — R25.2 LEG CRAMPS: ICD-10-CM

## 2024-01-08 DIAGNOSIS — R41.840 ATTENTION AND CONCENTRATION DEFICIT: ICD-10-CM

## 2024-01-08 DIAGNOSIS — F32.A ANXIETY AND DEPRESSION: Primary | ICD-10-CM

## 2024-01-08 PROCEDURE — 99214 OFFICE O/P EST MOD 30 MIN: CPT | Performed by: NURSE PRACTITIONER

## 2024-01-08 RX ORDER — CYCLOBENZAPRINE HCL 10 MG
TABLET ORAL
Qty: 30 TABLET | Refills: 0 | OUTPATIENT
Start: 2024-01-08

## 2024-01-08 RX ORDER — ATOMOXETINE 18 MG/1
18 CAPSULE ORAL DAILY
Qty: 30 CAPSULE | Refills: 2 | Status: SHIPPED | OUTPATIENT
Start: 2024-01-08

## 2024-01-08 RX ORDER — CARIPRAZINE 1.5 MG/1
1.5 CAPSULE, GELATIN COATED ORAL DAILY
Qty: 90 CAPSULE | Refills: 1 | Status: SHIPPED | OUTPATIENT
Start: 2024-01-08

## 2024-01-08 RX ORDER — VILAZODONE HYDROCHLORIDE 40 MG/1
40 TABLET ORAL DAILY
Qty: 90 TABLET | Refills: 1 | Status: SHIPPED | OUTPATIENT
Start: 2024-01-08

## 2024-01-08 NOTE — PROGRESS NOTES
"Chief Complaint  Anxiety, Depression, and Med Refill    Subjective        Estephania Pathak presents to [facility]  History of Present Illness    Patient is here today for follow up on anxiety and depression.   Doing viibryd 40mg and vraylar.  Feels like she could tell a major difference when she was without the medication. But with them still feels like she doesn't have motivation and gumption.  But definitely helps with emotion control    Objective   Vital Signs:  /86   Pulse 87   Temp 98.6 °F (37 °C)   Ht 165.1 cm (65\")   Wt 87.3 kg (192 lb 6.4 oz)   SpO2 99%   BMI 32.02 kg/m²   Estimated body mass index is 32.02 kg/m² as calculated from the following:    Height as of this encounter: 165.1 cm (65\").    Weight as of this encounter: 87.3 kg (192 lb 6.4 oz).               Physical Exam  Constitutional:       Appearance: Normal appearance.   Cardiovascular:      Rate and Rhythm: Normal rate and regular rhythm.      Pulses: Normal pulses.   Pulmonary:      Effort: Pulmonary effort is normal.      Breath sounds: Normal breath sounds.   Skin:     General: Skin is warm and dry.   Neurological:      Mental Status: She is alert.   Psychiatric:         Mood and Affect: Mood normal.         Behavior: Behavior normal.         Thought Content: Thought content normal.         Judgment: Judgment normal.        Result Review :                   Assessment and Plan   Diagnoses and all orders for this visit:    1. Anxiety and depression (Primary)    2. Attention and concentration deficit    Other orders  -     Vraylar 1.5 MG capsule capsule; Take 1 capsule by mouth Daily.  Dispense: 90 capsule; Refill: 1  -     vilazodone (VIIBRYD) 40 MG tablet tablet; Take 1 tablet by mouth Daily.  Dispense: 90 tablet; Refill: 1  -     atomoxetine (Strattera) 18 MG capsule; Take 1 capsule by mouth Daily.  Dispense: 30 capsule; Refill: 2      Continue Vraylar and Viibryd.  We discussed other options.  I did " review her Ramiro and Strattera had a couple recommendations for it so we are going to start a low-dose.  If any issues notify provider.  Will follow-up in 4 weeks for medication management.           Follow Up   Return in about 4 weeks (around 2/5/2024), or if symptoms worsen or fail to improve, for Next scheduled follow up.  Patient was given instructions and counseling regarding her condition or for health maintenance advice. Please see specific information pulled into the AVS if appropriate.

## 2024-01-09 ENCOUNTER — TELEPHONE (OUTPATIENT)
Dept: FAMILY MEDICINE CLINIC | Facility: CLINIC | Age: 29
End: 2024-01-09

## 2024-01-09 ENCOUNTER — TELEPHONE (OUTPATIENT)
Dept: FAMILY MEDICINE CLINIC | Facility: CLINIC | Age: 29
End: 2024-01-09
Payer: COMMERCIAL

## 2024-01-09 NOTE — TELEPHONE ENCOUNTER
Caller: Estephania Pathak    Relationship: Self    Best call back number: [phone]     What medication are you requesting: NICOTINE CARTRIDGE TO STOP SMOKING     If a prescription is needed, what is your preferred pharmacy and phone number:      [facility] - [address] - [phone]  - [phone] FX     Additional notes: PLEASE ADVISE.

## 2024-01-09 NOTE — TELEPHONE ENCOUNTER
Caller: Estephania Pathak    Relationship: Self    Best call back number: [phone]     What was the call regarding: PATIENT STATED THAT SHE WAS PRESCRIBED AN ADD MEDICATION YESTERDAY AND THE PHARMACY IS SUPPOSED TO BE REACHING OUT REGARDING GETTING IT APPROVED. PLEASE ADVISE.    PHARMACY: [facility] - [address] - [phone]  - [phone] FX

## 2024-01-10 NOTE — TELEPHONE ENCOUNTER
We did not discuss any cessation options at last visit as she did not mention to me.  By nicotine cartridge is she meaning the nicotrol inhaler?

## 2024-01-26 DIAGNOSIS — R25.2 LEG CRAMPS: ICD-10-CM

## 2024-01-26 RX ORDER — CYCLOBENZAPRINE HCL 10 MG
TABLET ORAL
Qty: 30 TABLET | Refills: 0 | Status: SHIPPED | OUTPATIENT
Start: 2024-01-26

## 2024-01-30 DIAGNOSIS — R25.2 LEG CRAMPS: ICD-10-CM

## 2024-01-31 RX ORDER — CYCLOBENZAPRINE HCL 10 MG
TABLET ORAL
Qty: 30 TABLET | Refills: 0 | OUTPATIENT
Start: 2024-01-31

## 2024-02-09 ENCOUNTER — OFFICE VISIT (OUTPATIENT)
Dept: FAMILY MEDICINE CLINIC | Facility: CLINIC | Age: 29
End: 2024-02-09
Payer: COMMERCIAL

## 2024-02-09 VITALS
DIASTOLIC BLOOD PRESSURE: 76 MMHG | HEIGHT: 65 IN | BODY MASS INDEX: 31.65 KG/M2 | SYSTOLIC BLOOD PRESSURE: 128 MMHG | TEMPERATURE: 97.3 F | WEIGHT: 190 LBS | OXYGEN SATURATION: 98 % | HEART RATE: 72 BPM

## 2024-02-09 DIAGNOSIS — F41.9 ANXIETY: ICD-10-CM

## 2024-02-09 DIAGNOSIS — F41.9 ANXIETY AND DEPRESSION: Primary | ICD-10-CM

## 2024-02-09 DIAGNOSIS — R41.840 ATTENTION AND CONCENTRATION DEFICIT: ICD-10-CM

## 2024-02-09 DIAGNOSIS — F32.A ANXIETY AND DEPRESSION: Primary | ICD-10-CM

## 2024-02-09 DIAGNOSIS — F17.210 CIGARETTE NICOTINE DEPENDENCE WITHOUT COMPLICATION: ICD-10-CM

## 2024-02-09 DIAGNOSIS — F33.0 MAJOR DEPRESSIVE DISORDER, RECURRENT, MILD: ICD-10-CM

## 2024-02-09 PROCEDURE — 99406 BEHAV CHNG SMOKING 3-10 MIN: CPT | Performed by: NURSE PRACTITIONER

## 2024-02-09 PROCEDURE — 1159F MED LIST DOCD IN RCRD: CPT | Performed by: NURSE PRACTITIONER

## 2024-02-09 PROCEDURE — 99214 OFFICE O/P EST MOD 30 MIN: CPT | Performed by: NURSE PRACTITIONER

## 2024-02-09 PROCEDURE — 1160F RVW MEDS BY RX/DR IN RCRD: CPT | Performed by: NURSE PRACTITIONER

## 2024-02-09 RX ORDER — ATOMOXETINE 18 MG/1
18 CAPSULE ORAL DAILY
Qty: 90 CAPSULE | Refills: 1 | Status: SHIPPED | OUTPATIENT
Start: 2024-02-09

## 2024-02-09 RX ORDER — NICOTINE 4 MG/1
1 INHALANT RESPIRATORY (INHALATION) AS NEEDED
Qty: 168 EACH | Refills: 5 | Status: SHIPPED | OUTPATIENT
Start: 2024-02-09

## 2024-02-19 DIAGNOSIS — R25.2 LEG CRAMPS: ICD-10-CM

## 2024-02-19 RX ORDER — CYCLOBENZAPRINE HCL 10 MG
TABLET ORAL
Qty: 30 TABLET | Refills: 0 | Status: SHIPPED | OUTPATIENT
Start: 2024-02-19

## 2024-02-20 DIAGNOSIS — K52.832 LYMPHOCYTIC COLITIS: ICD-10-CM

## 2024-02-20 RX ORDER — MONTELUKAST SODIUM 4 MG/1
TABLET, CHEWABLE ORAL
Qty: 60 TABLET | Refills: 2 | Status: SHIPPED | OUTPATIENT
Start: 2024-02-20

## 2024-02-27 DIAGNOSIS — R25.2 LEG CRAMPS: ICD-10-CM

## 2024-02-28 RX ORDER — CYCLOBENZAPRINE HCL 10 MG
TABLET ORAL
Qty: 30 TABLET | Refills: 0 | Status: SHIPPED | OUTPATIENT
Start: 2024-02-28

## 2024-03-26 DIAGNOSIS — R25.2 LEG CRAMPS: ICD-10-CM

## 2024-03-27 RX ORDER — CYCLOBENZAPRINE HCL 10 MG
TABLET ORAL
Qty: 30 TABLET | Refills: 0 | Status: SHIPPED | OUTPATIENT
Start: 2024-03-27

## 2024-04-23 DIAGNOSIS — R25.2 LEG CRAMPS: ICD-10-CM

## 2024-04-24 RX ORDER — CYCLOBENZAPRINE HCL 10 MG
TABLET ORAL
Qty: 30 TABLET | Refills: 0 | Status: SHIPPED | OUTPATIENT
Start: 2024-04-24

## 2024-04-25 ENCOUNTER — OFFICE VISIT (OUTPATIENT)
Dept: FAMILY MEDICINE CLINIC | Facility: CLINIC | Age: 29
End: 2024-04-25
Payer: COMMERCIAL

## 2024-04-25 ENCOUNTER — TELEPHONE (OUTPATIENT)
Dept: FAMILY MEDICINE CLINIC | Facility: CLINIC | Age: 29
End: 2024-04-25

## 2024-04-25 VITALS
TEMPERATURE: 98.3 F | OXYGEN SATURATION: 98 % | HEIGHT: 65 IN | HEART RATE: 68 BPM | SYSTOLIC BLOOD PRESSURE: 124 MMHG | DIASTOLIC BLOOD PRESSURE: 70 MMHG | BODY MASS INDEX: 32.79 KG/M2 | WEIGHT: 196.8 LBS

## 2024-04-25 DIAGNOSIS — F17.210 CIGARETTE NICOTINE DEPENDENCE WITHOUT COMPLICATION: ICD-10-CM

## 2024-04-25 DIAGNOSIS — F41.9 ANXIETY: ICD-10-CM

## 2024-04-25 DIAGNOSIS — M79.641 RIGHT HAND PAIN: ICD-10-CM

## 2024-04-25 DIAGNOSIS — F33.0 MAJOR DEPRESSIVE DISORDER, RECURRENT, MILD: Primary | ICD-10-CM

## 2024-04-25 PROCEDURE — 99214 OFFICE O/P EST MOD 30 MIN: CPT | Performed by: NURSE PRACTITIONER

## 2024-04-25 RX ORDER — HYDROXYZINE HYDROCHLORIDE 25 MG/1
25-50 TABLET, FILM COATED ORAL 3 TIMES DAILY PRN
Qty: 90 TABLET | Refills: 1 | Status: SHIPPED | OUTPATIENT
Start: 2024-04-25

## 2024-04-25 RX ORDER — NICOTINE 21-14-7MG
1 KIT TRANSDERMAL DAILY
Qty: 56 EACH | Refills: 0 | Status: SHIPPED | OUTPATIENT
Start: 2024-04-25
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
## 2024-04-25 NOTE — TELEPHONE ENCOUNTER
Patient needs to be seen so we can discuss medication options.  We can discuss via tele-health or in person visit.

## 2024-04-26 DIAGNOSIS — M79.641 RIGHT HAND PAIN: ICD-10-CM

## 2024-04-29 ENCOUNTER — TELEPHONE (OUTPATIENT)
Dept: FAMILY MEDICINE CLINIC | Facility: CLINIC | Age: 29
End: 2024-04-29
Payer: COMMERCIAL

## 2024-04-29 RX ORDER — CELECOXIB 100 MG/1
100 CAPSULE ORAL 2 TIMES DAILY PRN
Qty: 30 CAPSULE | Refills: 0 | Status: SHIPPED | OUTPATIENT
Start: 2024-04-29

## 2024-04-29 NOTE — TELEPHONE ENCOUNTER
----- Message from Luis Iyer sent at 4/29/2024  9:05 AM EDT -----  Estephania Pathak was informed with Luis's message. She stated that swelling and redness are the same and it is very painful. Please advise.

## 2024-04-29 NOTE — PROGRESS NOTES
Please call patient with results of x-ray.  No hand fracture or, dislocation or foreign body is noted.  How is the swelling and redness in that hand?

## 2024-05-08 RX ORDER — PRENATAL VIT/IRON FUM/FOLIC AC 27MG-0.8MG
1 TABLET ORAL DAILY
Qty: 90 TABLET | Refills: 3 | Status: SHIPPED | OUTPATIENT
Start: 2024-05-08

## 2024-05-14 ENCOUNTER — TELEPHONE (OUTPATIENT)
Dept: FAMILY MEDICINE CLINIC | Facility: CLINIC | Age: 29
End: 2024-05-14
Payer: COMMERCIAL

## 2024-05-14 NOTE — TELEPHONE ENCOUNTER
Pt wanted to let you know she does not currently have insurance and is out of her Viibryd, and has been out since Friday.

## 2024-05-15 NOTE — TELEPHONE ENCOUNTER
Please let her know that I wasn't aware, but viibryd has a generic now.  Therefore we don't have samples, but it is keith 35.82  with nimesh at [facility] if she wants me to send it there for her.

## 2024-05-15 NOTE — TELEPHONE ENCOUNTER
Spoke to pt she is going to call her insurance company and see when it will be reinstated then she will call back

## 2024-05-24 RX ORDER — HYDROXYZINE HYDROCHLORIDE 25 MG/1
TABLET, FILM COATED ORAL
Qty: 90 TABLET | Refills: 1 | Status: SHIPPED | OUTPATIENT
Start: 2024-05-24

## 2024-05-29 ENCOUNTER — TELEPHONE (OUTPATIENT)
Dept: FAMILY MEDICINE CLINIC | Facility: CLINIC | Age: 29
End: 2024-05-29
Payer: COMMERCIAL

## 2024-05-29 NOTE — TELEPHONE ENCOUNTER
Pt called asking if its okay if she takes her regular daily meds with her suboxone. Please advise.

## 2024-05-30 DIAGNOSIS — F19.10 POLYSUBSTANCE ABUSE: Primary | ICD-10-CM

## 2024-05-30 RX ORDER — BUPRENORPHINE HYDROCHLORIDE AND NALOXONE HYDROCHLORIDE DIHYDRATE 8; 2 MG/1; MG/1
1 TABLET SUBLINGUAL DAILY
Start: 2024-05-30

## 2024-07-02 DIAGNOSIS — R25.2 LEG CRAMPS: ICD-10-CM

## 2024-07-02 RX ORDER — HYDROXYZINE HYDROCHLORIDE 25 MG/1
TABLET, FILM COATED ORAL
Qty: 90 TABLET | Refills: 1 | Status: SHIPPED | OUTPATIENT
Start: 2024-07-02

## 2024-07-03 DIAGNOSIS — K52.832 LYMPHOCYTIC COLITIS: ICD-10-CM

## 2024-07-03 RX ORDER — CYCLOBENZAPRINE HCL 10 MG
TABLET ORAL
Qty: 30 TABLET | Refills: 0 | Status: SHIPPED | OUTPATIENT
Start: 2024-07-03

## 2024-07-03 RX ORDER — MONTELUKAST SODIUM 4 MG/1
1 TABLET, CHEWABLE ORAL 2 TIMES DAILY
Qty: 60 TABLET | Refills: 2 | Status: SHIPPED | OUTPATIENT
Start: 2024-07-03

## 2024-07-24 ENCOUNTER — OFFICE VISIT (OUTPATIENT)
Dept: FAMILY MEDICINE CLINIC | Facility: CLINIC | Age: 29
End: 2024-07-24
Payer: COMMERCIAL

## 2024-07-24 VITALS
OXYGEN SATURATION: 96 % | BODY MASS INDEX: 31.86 KG/M2 | SYSTOLIC BLOOD PRESSURE: 100 MMHG | DIASTOLIC BLOOD PRESSURE: 70 MMHG | HEIGHT: 65 IN | WEIGHT: 191.2 LBS | HEART RATE: 92 BPM

## 2024-07-24 DIAGNOSIS — F33.0 MAJOR DEPRESSIVE DISORDER, RECURRENT, MILD: Primary | ICD-10-CM

## 2024-07-24 DIAGNOSIS — F41.9 ANXIETY: ICD-10-CM

## 2024-07-24 DIAGNOSIS — R25.2 LEG CRAMPS: ICD-10-CM

## 2024-07-24 PROCEDURE — 99214 OFFICE O/P EST MOD 30 MIN: CPT | Performed by: NURSE PRACTITIONER

## 2024-07-24 PROCEDURE — 1126F AMNT PAIN NOTED NONE PRSNT: CPT | Performed by: NURSE PRACTITIONER

## 2024-07-24 RX ORDER — DESVENLAFAXINE SUCCINATE 50 MG/1
50 TABLET, EXTENDED RELEASE ORAL DAILY
Qty: 90 TABLET | Refills: 0 | Status: SHIPPED | OUTPATIENT
Start: 2024-07-24

## 2024-07-24 RX ORDER — CYCLOBENZAPRINE HCL 10 MG
10 TABLET ORAL 3 TIMES DAILY PRN
Qty: 90 TABLET | Refills: 0 | Status: SHIPPED | OUTPATIENT
Start: 2024-07-24

## 2024-07-24 RX ORDER — HYDROXYZINE HYDROCHLORIDE 25 MG/1
TABLET, FILM COATED ORAL
Qty: 90 TABLET | Refills: 1 | Status: SHIPPED | OUTPATIENT
Start: 2024-07-24 | End: 2024-07-24 | Stop reason: SDUPTHER

## 2024-07-24 RX ORDER — PRENATAL VIT/IRON FUM/FOLIC AC 27MG-0.8MG
1 TABLET ORAL DAILY
Qty: 90 TABLET | Refills: 3 | Status: SHIPPED | OUTPATIENT
Start: 2024-07-24

## 2024-07-24 RX ORDER — HYDROXYZINE HYDROCHLORIDE 25 MG/1
25-50 TABLET, FILM COATED ORAL EVERY 4 HOURS PRN
Qty: 180 TABLET | Refills: 0 | Status: SHIPPED | OUTPATIENT
Start: 2024-07-24

## 2024-07-24 RX ORDER — TOPIRAMATE 50 MG/1
50 TABLET, FILM COATED ORAL 2 TIMES DAILY
Qty: 180 TABLET | Refills: 0 | Status: SHIPPED | OUTPATIENT
Start: 2024-07-24

## 2024-07-24 NOTE — PROGRESS NOTES
"Chief Complaint  Medication Problem (Quit taking all meds except saboxone she ran out of refills and since she got them filled she stopped taking them because she's not sure she should take that dose. Insurance is going to end again at the end of the month so she's not sure she wants to continue taking them because she cannot afford them. Has felt emotional since stopping all meds. )    Subjective        Estephania Pathak presents to [facility]  History of Present Illness    Patient presents today for follow up on mood and anxiety.  She reports that she has felt emotional since stopping medication.  Stopped vraylar or viibryd 3-4 days ago and the other about a month ago.   Stopped prenatal and colace 3 days ago.      Makes too much money for insurance and doesn't have custody of son  Will qualify for some version of passport, but not sure what that lkooks like       Objective   Vital Signs:  /70   Pulse 92   Ht 165.1 cm (65\")   Wt 86.7 kg (191 lb 3.2 oz)   SpO2 96%   BMI 31.82 kg/m²   Estimated body mass index is 31.82 kg/m² as calculated from the following:    Height as of this encounter: 165.1 cm (65\").    Weight as of this encounter: 86.7 kg (191 lb 3.2 oz).             Physical Exam  Constitutional:       Appearance: Normal appearance.   Cardiovascular:      Rate and Rhythm: Normal rate and regular rhythm.      Pulses: Normal pulses.   Pulmonary:      Effort: Pulmonary effort is normal.      Breath sounds: Normal breath sounds.   Skin:     General: Skin is warm and dry.   Neurological:      Mental Status: She is alert.   Psychiatric:         Mood and Affect: Mood normal.         Behavior: Behavior normal.         Thought Content: Thought content normal.         Judgment: Judgment normal.        Result Review :                     Assessment and Plan     Diagnoses and all orders for this visit:    1. Major depressive disorder, recurrent, mild (Primary)    2. Leg cramps  -     " cyclobenzaprine (FLEXERIL) 10 MG tablet; Take 1 tablet by mouth 3 (Three) Times a Day As Needed for Muscle Spasms.  Dispense: 90 tablet; Refill: 0    3. Anxiety    Other orders  -     desvenlafaxine (Pristiq) 50 MG 24 hr tablet; Take 1 tablet by mouth Daily.  Dispense: 90 tablet; Refill: 0  -     topiramate (Topamax) 50 MG tablet; Take 1 tablet by mouth 2 (Two) Times a Day.  Dispense: 180 tablet; Refill: 0  -     hydrOXYzine (ATARAX) 25 MG tablet; Take 1-2 tablets by mouth Every 4 (Four) Hours As Needed for Itching.  Dispense: 180 tablet; Refill: 0  -     Prenatal Vit-Fe Fumarate-FA (prenatal vitamin 27-0.8) 27-0.8 MG tablet tablet; Take 1 tablet by mouth Daily.  Dispense: 90 tablet; Refill: 3    We are going to start some medications that are more affordable for her.  Will looked up and will trial Pristiq in place of Viibryd.  We looked up good Rx cost.  Will trial Topamax in place of Vraylar.  If any worsening mood or side effects notify provider.  If any suicidal homicidal ideations go to the ER.  If she is without insurance she should send me a [email or web address] message in the next month with how she is doing.  If she still has insurance she should make follow-up in 4 weeks.  She verbalized understanding and is agreeable.  May continue hydroxyzine as needed and prescription given.    Prescription given for refill of prenatal and Flexeril.         Follow Up     No follow-ups on file.  Patient was given instructions and counseling regarding her condition or for health maintenance advice. Please see specific information pulled into the AVS if appropriate.

## 2024-07-29 RX ORDER — NICOTINE 21 MG/24HR
1 PATCH, TRANSDERMAL 24 HOURS TRANSDERMAL EVERY 24 HOURS
Qty: 90 EACH | Refills: 0 | Status: SHIPPED | OUTPATIENT
Start: 2024-07-29

## 2024-08-16 RX ORDER — HYDROXYZINE HYDROCHLORIDE 25 MG/1
TABLET, FILM COATED ORAL
Qty: 90 TABLET | Refills: 1 | Status: SHIPPED | OUTPATIENT
Start: 2024-08-16

## 2024-08-16 RX ORDER — ATOMOXETINE 18 MG/1
18 CAPSULE ORAL DAILY
Qty: 90 CAPSULE | Refills: 1 | Status: SHIPPED | OUTPATIENT
Start: 2024-08-16

## 2024-09-06 RX ORDER — TOPIRAMATE 50 MG/1
50 TABLET, FILM COATED ORAL 2 TIMES DAILY
Qty: 180 TABLET | Refills: 0 | Status: SHIPPED | OUTPATIENT
Start: 2024-09-06

## 2024-09-06 RX ORDER — DESVENLAFAXINE 50 MG/1
50 TABLET, FILM COATED, EXTENDED RELEASE ORAL DAILY
Qty: 90 TABLET | Refills: 0 | Status: SHIPPED | OUTPATIENT
Start: 2024-09-06

## 2024-09-06 NOTE — TELEPHONE ENCOUNTER
We do not take her insurance she is trying to figure it out   will call back to make apt if she gets different insurance

## 2024-09-12 ENCOUNTER — TELEPHONE (OUTPATIENT)
Dept: FAMILY MEDICINE CLINIC | Facility: CLINIC | Age: 29
End: 2024-09-12
Payer: COMMERCIAL

## 2024-09-12 NOTE — TELEPHONE ENCOUNTER
Caller: Estephania Pathak    Relationship: Self    Best call back number: [phone]    What form or medical record are you requesting: COPY OF GENE SITE TESTING     How would you like to receive the form or medical records (pick-up, mail, fax): UPLOAD IN [employer] & (FAX WILL CALL BACK WITH FAX #)    Timeframe paperwork needed: ASAP     Additional notes: PT DOES NOT REMEMBER DATE, STATES IT WAS ORDERED BY PCP WITHIN THE LAST 5 YEARS OR SO.

## 2024-10-04 RX ORDER — NICOTINE 21 MG/24HR
1 PATCH, TRANSDERMAL 24 HOURS TRANSDERMAL DAILY
Qty: 90 PATCH | Refills: 0 | Status: SHIPPED | OUTPATIENT
Start: 2024-10-04

## 2024-10-17 ENCOUNTER — PATIENT MESSAGE (OUTPATIENT)
Dept: FAMILY MEDICINE CLINIC | Facility: CLINIC | Age: 29
End: 2024-10-17
Payer: COMMERCIAL

## 2024-10-17 RX ORDER — NICOTINE 21 MG/24HR
1 PATCH, TRANSDERMAL 24 HOURS TRANSDERMAL EVERY 24 HOURS
Qty: 28 EACH | Refills: 0 | Status: SHIPPED | OUTPATIENT
Start: 2024-10-17

## 2024-12-19 RX ORDER — VALACYCLOVIR HYDROCHLORIDE 500 MG/1
500 TABLET, FILM COATED ORAL 2 TIMES DAILY
Qty: 6 TABLET | Refills: 2 | Status: SHIPPED | OUTPATIENT
Start: 2024-12-19 | End: 2024-12-22

## 2024-12-19 RX ORDER — ZINC OXIDE 20 %
1 OINTMENT (GRAM) TOPICAL 4 TIMES DAILY PRN
Qty: 85 G | Refills: 0 | Status: SHIPPED | OUTPATIENT
Start: 2024-12-19

## 2024-12-27 RX ORDER — NICOTINE 21 MG/24HR
1 PATCH, TRANSDERMAL 24 HOURS TRANSDERMAL DAILY
Qty: 90 PATCH | Refills: 0 | OUTPATIENT
Start: 2024-12-27

## 2025-01-17 RX ORDER — NICOTINE 21 MG/24HR
1 PATCH, TRANSDERMAL 24 HOURS TRANSDERMAL DAILY
Qty: 90 PATCH | Refills: 0 | OUTPATIENT
Start: 2025-01-17

## 2025-01-17 NOTE — TELEPHONE ENCOUNTER
Last prescription that I gave her if this was for the 7 mg patches.  I have not seen this patient since July.  She really needs to get in for follow-up so we can reassess her health and get on the same page.

## 2025-01-17 NOTE — TELEPHONE ENCOUNTER
Rx Refill Note  Requested Prescriptions     Pending Prescriptions Disp Refills    nicotine (NICODERM CQ) 21 MG/24HR patch [Pharmacy Med Name: nicotine 21 mg/24 hr daily transdermal patch] 90 patch 0     Sig: PLACE ONE PATCH ON THE SKIN AS DIRECTED BY PROVIDER DAILY      Last office visit with prescribing clinician: 7/24/2024   Last telemedicine visit with prescribing clinician: Visit date not found   Next office visit with prescribing clinician: Visit date not found                         Would you like a call back once the refill request has been completed: [] Yes [] No    If the office needs to give you a call back, can they leave a voicemail: [] Yes [] No    Savita White MA  01/17/25, 10:55 EST

## 2025-02-24 RX ORDER — NICOTINE 21 MG/24HR
PATCH, TRANSDERMAL 24 HOURS TRANSDERMAL
Qty: 28 PATCH | Refills: 0 | OUTPATIENT
Start: 2025-02-24

## 2025-03-13 RX ORDER — VALACYCLOVIR HYDROCHLORIDE 500 MG/1
TABLET, FILM COATED ORAL
Qty: 6 TABLET | Refills: 2 | OUTPATIENT
Start: 2025-03-13

## 2025-03-13 RX ORDER — NICOTINE 21 MG/24HR
PATCH, TRANSDERMAL 24 HOURS TRANSDERMAL
Qty: 28 PATCH | Refills: 0 | OUTPATIENT
Start: 2025-03-13

## 2025-03-13 RX ORDER — HYDROXYZINE HYDROCHLORIDE 25 MG/1
TABLET, FILM COATED ORAL
Qty: 90 TABLET | Refills: 1 | OUTPATIENT
Start: 2025-03-13

## 2025-03-13 NOTE — TELEPHONE ENCOUNTER
VALACYCLOVIR REFUSED    Rx Refill Note  Requested Prescriptions     Pending Prescriptions Disp Refills    nicotine (NICODERM CQ) 14 MG/24HR patch [Pharmacy Med Name: nicotine 14 mg/24 hr daily transdermal patch] 28 patch 0     Sig: place ONE PATCH ON THE SKIN daily AS DIRECTED by provider    hydrOXYzine (ATARAX) 25 MG tablet [Pharmacy Med Name: hydroxyzine HCl 25 mg tablet] 90 tablet 1     Sig: TAKE 1 OR 2 TABLETS BY MOUTH THREE TIMES DAILY AS NEEDED FOR ANXIETY     Refused Prescriptions Disp Refills    valACYclovir (VALTREX) 500 MG tablet [Pharmacy Med Name: valacyclovir 500 mg tablet] 6 tablet 2     Sig: TAKE ONE TABLET BY MOUTH TWICE DAILY FOR 3 DAYS      Last office visit with prescribing clinician: 7/24/2024   Last telemedicine visit with prescribing clinician: Visit date not found   Next office visit with prescribing clinician: Visit date not found                         Would you like a call back once the refill request has been completed: [] Yes [] No    If the office needs to give you a call back, can they leave a voicemail: [] Yes [] No    Savita White MA  03/13/25, 09:37 EDT

## 2025-04-11 RX ORDER — VALACYCLOVIR HYDROCHLORIDE 500 MG/1
500 TABLET, FILM COATED ORAL 2 TIMES DAILY
Qty: 6 TABLET | Refills: 2 | OUTPATIENT
Start: 2025-04-11 | End: 2025-04-14

## 2025-05-09 RX ORDER — VALACYCLOVIR HYDROCHLORIDE 500 MG/1
500 TABLET, FILM COATED ORAL 2 TIMES DAILY
Qty: 6 TABLET | Refills: 3 | OUTPATIENT
Start: 2025-05-09 | End: 2025-05-12

## 2025-05-09 RX ORDER — PNV NO.95/FERROUS FUM/FOLIC AC 28MG-0.8MG
1 TABLET ORAL DAILY
Qty: 90 TABLET | Refills: 3 | OUTPATIENT
Start: 2025-05-09

## 2025-05-19 RX ORDER — HYDROXYZINE HYDROCHLORIDE 25 MG/1
TABLET, FILM COATED ORAL
Qty: 180 TABLET | Refills: 0 | OUTPATIENT
Start: 2025-05-19

## 2025-05-23 ENCOUNTER — HOSPITAL ENCOUNTER (EMERGENCY)
Facility: HOSPITAL | Age: 30
Discharge: HOME | End: 2025-05-23
Attending: EMERGENCY MEDICINE
Payer: COMMERCIAL

## 2025-05-23 ENCOUNTER — APPOINTMENT (OUTPATIENT)
Dept: CT IMAGING | Facility: HOSPITAL | Age: 30
End: 2025-05-23
Payer: COMMERCIAL

## 2025-05-23 VITALS
RESPIRATION RATE: 16 BRPM | DIASTOLIC BLOOD PRESSURE: 79 MMHG | SYSTOLIC BLOOD PRESSURE: 127 MMHG | HEART RATE: 99 BPM | TEMPERATURE: 98.1 F | OXYGEN SATURATION: 97 %

## 2025-05-23 DIAGNOSIS — R10.30 LOWER ABDOMINAL PAIN: Primary | ICD-10-CM

## 2025-05-23 LAB
ALBUMIN SERPL-MCNC: 4.6 G/DL (ref 3.5–5.2)
ALBUMIN/GLOB SERPL: 1.6 G/DL
ALP SERPL-CCNC: 62 U/L (ref 39–117)
ALT SERPL W P-5'-P-CCNC: 16 U/L (ref 1–33)
ANION GAP SERPL CALCULATED.3IONS-SCNC: 10 MMOL/L (ref 5–15)
AST SERPL-CCNC: 20 U/L (ref 1–32)
BASOPHILS # BLD AUTO: 0.11 10*3/MM3 (ref 0–0.2)
BASOPHILS NFR BLD AUTO: 1 % (ref 0–1.5)
BILIRUB SERPL-MCNC: 0.2 MG/DL (ref 0–1.2)
BILIRUB UR QL STRIP: NEGATIVE
BUN SERPL-MCNC: 15 MG/DL (ref 6–20)
BUN/CREAT SERPL: 22.4 (ref 7–25)
CALCIUM SPEC-SCNC: 9.5 MG/DL (ref 8.6–10.5)
CHLORIDE SERPL-SCNC: 103 MMOL/L (ref 98–107)
CLARITY UR: CLEAR
CO2 SERPL-SCNC: 25 MMOL/L (ref 22–29)
COLOR UR: YELLOW
CREAT SERPL-MCNC: 0.67 MG/DL (ref 0.57–1)
DEPRECATED RDW RBC AUTO: 38.6 FL (ref 37–54)
EGFRCR SERPLBLD CKD-EPI 2021: 120 ML/MIN/1.73
EOSINOPHIL # BLD AUTO: 0.29 10*3/MM3 (ref 0–0.4)
EOSINOPHIL NFR BLD AUTO: 2.6 % (ref 0.3–6.2)
ERYTHROCYTE [DISTWIDTH] IN BLOOD BY AUTOMATED COUNT: 12.3 % (ref 12.3–15.4)
GLOBULIN UR ELPH-MCNC: 2.9 GM/DL
GLUCOSE SERPL-MCNC: 91 MG/DL (ref 65–99)
GLUCOSE UR STRIP-MCNC: NEGATIVE MG/DL
HCG SERPL QL: NEGATIVE
HCT VFR BLD AUTO: 42.7 % (ref 34–46.6)
HGB BLD-MCNC: 14.5 G/DL (ref 12–15.9)
HGB UR QL STRIP.AUTO: NEGATIVE
HOLD SPECIMEN: NORMAL
IMM GRANULOCYTES # BLD AUTO: 0.03 10*3/MM3 (ref 0–0.05)
IMM GRANULOCYTES NFR BLD AUTO: 0.3 % (ref 0–0.5)
KETONES UR QL STRIP: NEGATIVE
LEUKOCYTE ESTERASE UR QL STRIP.AUTO: NEGATIVE
LIPASE SERPL-CCNC: 21 U/L (ref 13–60)
LYMPHOCYTES # BLD AUTO: 3.39 10*3/MM3 (ref 0.7–3.1)
LYMPHOCYTES NFR BLD AUTO: 30.7 % (ref 19.6–45.3)
MCH RBC QN AUTO: 29.2 PG (ref 26.6–33)
MCHC RBC AUTO-ENTMCNC: 34 G/DL (ref 31.5–35.7)
MCV RBC AUTO: 85.9 FL (ref 79–97)
MONOCYTES # BLD AUTO: 0.63 10*3/MM3 (ref 0.1–0.9)
MONOCYTES NFR BLD AUTO: 5.7 % (ref 5–12)
NEUTROPHILS NFR BLD AUTO: 59.7 % (ref 42.7–76)
NEUTROPHILS NFR BLD AUTO: 6.59 10*3/MM3 (ref 1.7–7)
NITRITE UR QL STRIP: NEGATIVE
NRBC BLD AUTO-RTO: 0 /100 WBC (ref 0–0.2)
PH UR STRIP.AUTO: 7 [PH] (ref 5–8)
PLATELET # BLD AUTO: 299 10*3/MM3 (ref 140–450)
PMV BLD AUTO: 10 FL (ref 6–12)
POTASSIUM SERPL-SCNC: 3.9 MMOL/L (ref 3.5–5.2)
PROT SERPL-MCNC: 7.5 G/DL (ref 6–8.5)
PROT UR QL STRIP: NEGATIVE
RBC # BLD AUTO: 4.97 10*6/MM3 (ref 3.77–5.28)
SODIUM SERPL-SCNC: 138 MMOL/L (ref 136–145)
SP GR UR STRIP: 1.02 (ref 1–1.03)
UROBILINOGEN UR QL STRIP: NORMAL
WBC NRBC COR # BLD AUTO: 11.04 10*3/MM3 (ref 3.4–10.8)
WHOLE BLOOD HOLD COAG: NORMAL
WHOLE BLOOD HOLD SPECIMEN: NORMAL

## 2025-05-23 PROCEDURE — 83690 ASSAY OF LIPASE: CPT | Performed by: EMERGENCY MEDICINE

## 2025-05-23 PROCEDURE — 85025 COMPLETE CBC W/AUTO DIFF WBC: CPT | Performed by: EMERGENCY MEDICINE

## 2025-05-23 PROCEDURE — 84703 CHORIONIC GONADOTROPIN ASSAY: CPT | Performed by: EMERGENCY MEDICINE

## 2025-05-23 PROCEDURE — 80053 COMPREHEN METABOLIC PANEL: CPT | Performed by: EMERGENCY MEDICINE

## 2025-05-23 PROCEDURE — 74177 CT ABD & PELVIS W/CONTRAST: CPT

## 2025-05-23 PROCEDURE — 99285 EMERGENCY DEPT VISIT HI MDM: CPT

## 2025-05-23 PROCEDURE — 36415 COLL VENOUS BLD VENIPUNCTURE: CPT | Performed by: EMERGENCY MEDICINE

## 2025-05-23 PROCEDURE — 25510000001 IOPAMIDOL PER 1 ML: Performed by: EMERGENCY MEDICINE

## 2025-05-23 PROCEDURE — 81003 URINALYSIS AUTO W/O SCOPE: CPT

## 2025-05-23 RX ORDER — IOPAMIDOL 755 MG/ML
100 INJECTION, SOLUTION INTRAVASCULAR
Status: COMPLETED | OUTPATIENT
Start: 2025-05-23 | End: 2025-05-23

## 2025-05-23 RX ORDER — DICYCLOMINE HCL 20 MG
20 TABLET ORAL EVERY 6 HOURS PRN
Qty: 30 TABLET | Refills: 0 | Status: SHIPPED | OUTPATIENT
Start: 2025-05-23 | End: 2025-06-02 | Stop reason: SDUPTHER

## 2025-05-23 RX ORDER — SODIUM CHLORIDE 0.9 % (FLUSH) 0.9 %
10 SYRINGE (ML) INJECTION AS NEEDED
Status: DISCONTINUED | OUTPATIENT
Start: 2025-05-23 | End: 2025-05-24 | Stop reason: HOSPADM

## 2025-05-23 RX ADMIN — IOPAMIDOL 85 ML: 755 INJECTION, SOLUTION INTRAVENOUS at 21:27

## 2025-05-23 NOTE — ED TRIAGE NOTES
"Patient to ED per PV from home w/ reports of low abdominal \"aching\" pain after eating \"for months\"; patient states that today she has \"sharp\" pain in her abdomen, rectal pain, low back pain. Patient arrived to ED drinking Big Gulp XL; informed of NPO status until cleared by provider.  "

## 2025-05-24 NOTE — ED PROVIDER NOTES
MD ATTESTATION NOTE      Brief HPI: Patient complains of intermittent lower abdominal pain after eating for the past few months.  Symptoms returned earlier this evening but she also had some sharp shooting pains in her rectum and lower back.  Pain radiated into both legs.  She denies fever, chills, vomiting, diarrhea, flank pain, dysuria, hematuria, saddle anesthesia, loss of bowel/bladder control, or leg weakness.    PHYSICAL EXAM    GENERAL: Awake, alert, and oriented x 3.  Resting comfortably in no acute distress  HENT: nares patent  EYES: no scleral icterus  CV: regular rhythm, normal rate  RESPIRATORY: normal effort, CTAB  ABDOMEN: soft, nondistended, nontender, no CVA tenderness  MUSCULOSKELETAL: no deformity, extremities are nontender, L spine is nontender  NEURO: Normal light touch sensation in both lower extremities.  Normal and equal strength with bilateral hip flexion/extension, knee flexion/extension, ankle flexion/extension, and extension of the great toes.  Seizure.  Straight leg raise negative bilaterally  PSYCH:  calm, cooperative  SKIN: warm, dry    Vital signs and nursing notes reviewed.        Plan: White blood cell count is mildly elevated but labs are otherwise unremarkable.  CT scan shows some mildly patulous loops of small bowel but no bowel obstruction.  There is a right ovarian cyst.  Patient's back pain seems radicular but she does not have any focal weakness.  Plan is for outpatient symptomatic treatment.    CT abdomen/pelvis personally interpreted by me.  My personal interpretation is: No bowel obstruction.  No obstructive uropathy.      ED Course as of 05/23/25 2329   Fri May 23, 2025   2018 WBC(!): 11.04 [DC]   2018 Hemoglobin: 14.5 [DC]   2018 Creatinine: 0.67 [DC]   2018 Sodium: 138 [DC]   2018 Potassium: 3.9 [DC]   2018 Lipase: 21 [DC]   2018 HCG Qualitative: Negative [DC]      ED Course User Index  [DC] Daysi Vega PA Holland, William D, MD  05/23/25 2147     free air.   [DC]      ED Course User Index  [DC] Daysi Vega PA Holland, William D, MD  05/23/25 3695       Saleem Chaney MD  05/27/25 7137

## 2025-05-24 NOTE — ED PROVIDER NOTES
EMERGENCY DEPARTMENT ENCOUNTER      PCP: Provider, No Known  Patient Care Team:  Provider, No Known as PCP - Yocasta Valdovinos APRN as Nurse Practitioner (Gastroenterology)  Olimpia Franco MD as Gynecologist (Obstetrics and Gynecology)   Independent Historians: Patient    HPI:  Chief Complaint: Abdominal pain, rectal pain   A complete HPI/ROS/PMH/PSH/SH/FH are unobtainable due to: None    Chronic or social conditions impacting patient care (social determinants of health): None    Context: Estephania Pathak is a 29 y.o. female who presents to the ED c/o lower abdominal pain and rectal pain.  Patient states the past several months about 10 to 15 minutes after eating she develops lower abdominal discomfort.  She denies upper abdominal pain or vomiting.  No abnormal bowel movements or blood in her stool.  Patient states intermittently experienced a sharp pain to her umbilical region and in her rectal area.  Today she began experiencing more episodes of sharp rectal pain.  She has not had any difficulty with bowel movements.  She denies any blood in her stool.  No fevers. She does have occasional low back pain but denies numbness or weakness to the extremities. No bladder or bowel incontinence.    Review of prior external notes and/or external test results outside of this encounter: CMP on 6/27/22 was unremarkable.  Hemoglobin A1c on 11/19/20 was 5.1.      PAST MEDICAL HISTORY  Active Ambulatory Problems     Diagnosis Date Noted    Tobacco use 03/14/2019    Methadone use 10/11/2019    External hemorrhoids 02/14/2020    Hepatitis C 12/26/2019    Drug use 12/26/2019    Current every day smoker 12/26/2019    Polysubstance abuse 02/28/2020    Encounter for long-term methadone use for opiate dependence 10/11/2019    Mild episode of recurrent major depressive disorder 11/30/2021    Tobacco dependence syndrome 09/01/2001    Diarrhea 02/20/2023    Heroin dependence 08/17/2008    Moderate opioid dependence in early  remission on maintenance therapy 2020    Opioid dependence 2012    External hemorrhoids 2020    Hepatitis C 2019    High risk pregnancy due to maternal drug abuse in second trimester 2020    Recurrent major depressive episodes, moderate 2021    Lymphocytic colitis 05/15/2023     Resolved Ambulatory Problems     Diagnosis Date Noted    Missed  2019    Supervision of other normal pregnancy, antepartum 2019    High risk pregnancy due to maternal drug abuse in third trimester 2020    Acute opioid withdrawal 2020    Pregnancy 2020    Decreased fetal movements in third trimester 2020    Vaginal delivery 2020    Postpartum follow-up 2020    Elevated glucose level 2020    Breast mass in female 2021     Past Medical History:   Diagnosis Date    Depression     Hepatitis-C     Herpes genitalia     History of ADHD     History of vertebral fracture     Hx of drug abuse     Kidney stone 2018    Opiate addiction        The patient qualifies to receive the vaccine, but they have not yet received it.    PAST SURGICAL HISTORY  Past Surgical History:   Procedure Laterality Date    COLONOSCOPY N/A 2023    Procedure: COLONOSCOPY TO CECUM WITH COLD BIOPSIES;  Surgeon: Abdulaziz Donaldson MD;  Location: [facility] MAIN OR;  Service: Gastroenterology;  Laterality: N/A;  HEMORROIDS    D & C WITH SUCTION N/A 3/15/2019    Procedure: DILATATION AND CURETTAGE WITH SUCTION;  Surgeon: Megan Campos MD;  Location: [facility] MAIN OR;  Service: Obstetrics/Gynecology    KIDNEY STONE SURGERY      WISDOM TOOTH EXTRACTION           FAMILY HISTORY  Family History   Problem Relation Age of Onset    No Known Problems Father     Endometriosis Mother     Malig Hyperthermia Neg Hx     Breast cancer Neg Hx     Uterine cancer Neg Hx     Ovarian cancer Neg Hx     Colon cancer Neg Hx     Congenital heart disease Neg Hx     Colon polyps Neg Hx      Crohn's disease Neg Hx     Irritable bowel syndrome Neg Hx     Ulcerative colitis Neg Hx          SOCIAL HISTORY  Social History     Socioeconomic History    Marital status: Single    Number of children: 0   Tobacco Use    Smoking status: Every Day     Current packs/day: 0.50     Average packs/day: 0.5 packs/day for 10.0 years (5.0 ttl pk-yrs)     Types: Cigarettes     Passive exposure: Past (two cigarettes today)    Smokeless tobacco: Never    Tobacco comments:     not  interested currently-trying to quit on own   Vaping Use    Vaping status: Former    Substances: CBD   Substance and Sexual Activity    Alcohol use: Yes     Comment: 3x per week    Drug use: Not Currently     Types: Marijuana     Comment: heroin daily, over 3 years ago, pain pills-occasionally, a month for marijuana    Sexual activity: Not Currently     Partners: Male     Birth control/protection: Implant, Nexplanon     Comment: Nexplanon 08/15/2023         ALLERGIES  Patient has no known allergies.        REVIEW OF SYSTEMS  Review of Systems   Constitutional:  Negative for chills and fever.   Gastrointestinal:  Positive for abdominal pain and rectal pain. Negative for blood in stool, constipation and vomiting.   Genitourinary:  Negative for dysuria, frequency, vaginal bleeding and vaginal discharge.        All systems reviewed and negative except for those discussed in HPI.       PHYSICAL EXAM    I have reviewed the triage vital signs and nursing notes.    ED Triage Vitals   Temp Heart Rate Resp BP SpO2   05/23/25 1826 05/23/25 1826 05/23/25 1826 05/23/25 1828 05/23/25 1826   98.1 °F (36.7 °C) 99 16 127/79 97 %      Temp src Heart Rate Source Patient Position BP Location FiO2 (%)   05/23/25 1826 05/23/25 1826 05/23/25 1828 05/23/25 1828 --   Oral Monitor Sitting Right arm        Physical Exam  GENERAL: alert, no acute distress  SKIN: Warm, dry  HENT: Normocephalic, atraumatic  EYES: no scleral icterus  CV: regular rhythm, regular rate  RESPIRATORY:  normal effort, lungs clear  ABDOMEN: soft, nontender, nondistended  MUSCULOSKELETAL: no deformity  NEURO: alert, moves all extremities, follows commands          LAB RESULTS  Labs Reviewed   CBC WITH AUTO DIFFERENTIAL - Abnormal; Notable for the following components:       Result Value    WBC 11.04 (*)     Lymphocytes, Absolute 3.39 (*)     All other components within normal limits   LIPASE - Normal   URINALYSIS W/ MICROSCOPIC IF INDICATED (NO CULTURE) - Normal    Narrative:     Urine microscopic not indicated.   HCG, SERUM, QUALITATIVE - Normal   RAINBOW DRAW    Narrative:     The following orders were created for panel order [place] Draw.  Procedure                               Abnormality         Status                     ---------                               -----------         ------                     Green Top (Gel)[[id number]]                                  Final result               Lavender Top[[id number]]                                     Final result               Gold Top - SST[[id number]]                                                              Light Blue Top[[id number]]                                   Final result                 Please view results for these tests on the individual orders.   COMPREHENSIVE METABOLIC PANEL    Narrative:     GFR Categories in Chronic Kidney Disease (CKD)              GFR Category          GFR (mL/min/1.73)    Interpretation  G1                    90 or greater        Normal or high (1)  G2                    60-89                Mild decrease (1)  G3a                   45-59                Mild to moderate decrease  G3b                   30-44                Moderate to severe decrease  G4                    15-29                Severe decrease  G5                    14 or less           Kidney failure    (1)In the absence of evidence of kidney disease, neither GFR category G1 or G2 fulfill the criteria for CKD.    eGFR calculation 2021 CKD-EPI creatinine  equation, which does not include race as a factor   CBC AND DIFFERENTIAL    Narrative:     The following orders were created for panel order CBC & Differential.  Procedure                               Abnormality         Status                     ---------                               -----------         ------                     CBC Auto Differential[[id number]]        Abnormal            Final result                 Please view results for these tests on the individual orders.   GREEN TOP   LAVENDER TOP   LIGHT BLUE TOP         Ordered the above labs and independently reviewed and interpreted the results.        RADIOLOGY  CT Abdomen Pelvis With Contrast   Final Result       1. The patient does have some mildly patulous loops of small bowel   within the lower abdomen and right hemipelvis. Enteritis would be   consideration.   2. Probable hemorrhagic cyst on the right ovary.   3. Punctate nonobstructing bilateral renal stones.       Radiation dose reduction techniques were utilized, including automated   exposure control and exposure modulation based on body size.           This report was finalized on 5/23/2025 9:56 PM by Dr. Albina Anna M.D on Workstation: [id number]                I ordered the above noted radiological studies. Independently reviewed and interpreted by me.  See dictation for official radiology interpretation.      PROCEDURES    Procedures      MEDICATIONS GIVEN IN ER    Medications   iopamidol (ISOVUE-370) 76 % injection 100 mL (85 mL Intravenous Given by Other 5/23/25 2127)         PROGRESS, DATA ANALYSIS, CONSULTS, AND MEDICAL DECISION MAKING    All labs have been independently reviewed and interpreted by me.  All radiology studies have been independently reviewed and interpreted by me and discussed with radiologist dictating the report.   EKG's independently reviewed and interpreted by me.  Discussion below represents my analysis of pertinent findings related to patient's  condition, differential diagnosis, treatment plan and final disposition.    My differential diagnosis for abdominal pain includes but is not limited to:    Gastritis, gastroenteritis, peptic ulcer disease, GERD, esophageal perforation, acute appendicitis, mesenteric adenitis, Meckel’s diverticulum, epiploic appendagitis, diverticulitis, colon cancer, ulcerative colitis, Crohn’s disease, intussusception, small bowel obstruction, adhesions, ischemic bowel, perforated viscus, ileus, obstipation, biliary colic, cholecystitis, cholelithiasis, Shawn-Amandeep Tyler, hepatitis, pancreatitis, common bile duct obstruction, cholangitis, bile leak, splenic trauma, splenic rupture, splenic infarction, splenic abscess, abdominal abscess, ascites, spontaneous bacterial peritonitis, hernia, UTI, cystitis, ureterolithiasis, urinary obstruction, ovarian cyst, torsion, pregnancy, ectopic pregnancy, PID, pelvic abscess, mittelschmerz, endometriosis, AAA, myocardial infarction, pneumonia, cancer, porphyria, DKA, medications, sickle cell, viral syndrome, herpes zoster      ED Course as of 05/24/25 2234   Fri May 23, 2025   2018 WBC(!): 11.04 [DC]   2018 Hemoglobin: 14.5 [DC]   2018 Creatinine: 0.67 [DC]   2018 Sodium: 138 [DC]   2018 Potassium: 3.9 [DC]   2018 Lipase: 21 [DC]   2018 HCG Qualitative: Negative [DC]   2205 CT Abdomen Pelvis With Contrast  Radiology study independently interpreted by me and my findings are no free air.   [DC]      ED Course User Index  [DC] Daysi Vega PA             AS OF 22:34 EDT VITALS:    BP - 127/79  HR - 99  TEMP - 98.1 °F (36.7 °C) (Oral)  O2 SATS - 97%        DIAGNOSIS  Final diagnoses:   Lower abdominal pain         DISPOSITION  ED Disposition       ED Disposition   Discharge    Condition   Stable    Comment   --                  Note Disclaimer: At [facility], we believe that sharing information builds trust and better relationships. You are receiving this note because you recently visited  [facility]. It is possible you will see health information before a provider has talked with you about it. This kind of information can be easy to misunderstand. To help you fully understand what it means for your health, we urge you to discuss this note with your provider.         Daysi Vega PA  05/24/25 2238

## 2025-05-27 ENCOUNTER — TELEPHONE (OUTPATIENT)
Dept: GASTROENTEROLOGY | Facility: CLINIC | Age: 30
End: 2025-05-27
Payer: COMMERCIAL

## 2025-06-02 ENCOUNTER — TELEPHONE (OUTPATIENT)
Dept: GASTROENTEROLOGY | Facility: CLINIC | Age: 30
End: 2025-06-02

## 2025-06-02 ENCOUNTER — OFFICE VISIT (OUTPATIENT)
Dept: GASTROENTEROLOGY | Facility: CLINIC | Age: 30
End: 2025-06-02
Payer: COMMERCIAL

## 2025-06-02 VITALS
HEIGHT: 65 IN | SYSTOLIC BLOOD PRESSURE: 112 MMHG | DIASTOLIC BLOOD PRESSURE: 80 MMHG | BODY MASS INDEX: 26.33 KG/M2 | WEIGHT: 158 LBS

## 2025-06-02 DIAGNOSIS — T40.2X5A THERAPEUTIC OPIOID-INDUCED CONSTIPATION (OIC): Primary | ICD-10-CM

## 2025-06-02 DIAGNOSIS — R10.9 ABDOMINAL CRAMPING: ICD-10-CM

## 2025-06-02 DIAGNOSIS — K59.03 THERAPEUTIC OPIOID-INDUCED CONSTIPATION (OIC): Primary | ICD-10-CM

## 2025-06-02 PROCEDURE — 1159F MED LIST DOCD IN RCRD: CPT

## 2025-06-02 PROCEDURE — 99214 OFFICE O/P EST MOD 30 MIN: CPT

## 2025-06-02 PROCEDURE — 1160F RVW MEDS BY RX/DR IN RCRD: CPT

## 2025-06-02 RX ORDER — VALACYCLOVIR HYDROCHLORIDE 500 MG/1
500 TABLET, FILM COATED ORAL 2 TIMES DAILY
Qty: 6 TABLET | Refills: 3 | OUTPATIENT
Start: 2025-06-02 | End: 2025-06-05

## 2025-06-02 RX ORDER — PSEUDOEPHED/ACETAMINOPH/DIPHEN 30MG-500MG
TABLET ORAL
COMMUNITY
Start: 2025-03-07

## 2025-06-02 RX ORDER — PNV NO.95/FERROUS FUM/FOLIC AC 28MG-0.8MG
1 TABLET ORAL DAILY
Qty: 90 TABLET | Refills: 3 | OUTPATIENT
Start: 2025-06-02

## 2025-06-02 RX ORDER — HYDROXYZINE HYDROCHLORIDE 25 MG/1
TABLET, FILM COATED ORAL
Qty: 180 TABLET | Refills: 3 | OUTPATIENT
Start: 2025-06-02

## 2025-06-02 RX ORDER — DICYCLOMINE HCL 20 MG
20 TABLET ORAL
Qty: 90 TABLET | Refills: 3 | Status: SHIPPED | OUTPATIENT
Start: 2025-06-02

## 2025-06-02 RX ORDER — SACCHAROMYCES BOULARDII 250 MG
250 CAPSULE ORAL 2 TIMES DAILY
Qty: 60 CAPSULE | Refills: 11 | Status: SHIPPED | OUTPATIENT
Start: 2025-06-02 | End: 2025-06-04

## 2025-06-02 RX ORDER — IBUPROFEN 800 MG/1
800 TABLET, FILM COATED ORAL EVERY 8 HOURS PRN
COMMUNITY
Start: 2025-05-09

## 2025-06-02 NOTE — PROGRESS NOTES
PATIENT INFORMATION  Estephania Pathak       - 1995    CHIEF COMPLAINT  Chief Complaint   Patient presents with    Abdominal Pain    Rectal Pain       HISTORY OF PRESENT ILLNESS    Here today to establish care    Last seen at [facility] in     Bowels have never been normal, moving 1-2 times a week, tries to take apple juice pretty regularly because it helps her move. When she is not on suboxone she has constant diarrhea, otherwise more normal.  Hoping to wean from suboxone. PRN bentyl helps, 1-2 a day, makes pain more tolerable, pain was belly button to waistline. When she went to the ER it was her rectum and wrapping around lower back and down legs to knees. Sometimes hurting even when not eating,15 min after eating.    Bowels are once a week a week, solid and not hard, has not noticed any bleeding, straining sometimes, and possible hemorrhoid when straining. Will treat constipation, pt anxious about diarrhea returning and provided reassurance that will adjust the plan of care as needed.    No UGI complaints, ibuprofen less than weekly.    Seen in ER  with intermittent lower abdominal pain for a few months, with shooting pains in rectum. CMP, lipase, CBC unremarkable other than mild elevation WBC.  CT AP with mildly patulous small bowel, possible enteritis. Probable hemorrhagic cyst R ovary. Gerber punctate nonobstructing renal stones.    23 Colonoscopy with small, nonbleeding internal hemorrhoids, bx positive for microscopic colitis    PMHx HCV, rna negative        REVIEWED PERTINENT RESULTS/ LABS  Lab Results   Component Value Date    CASEREPORT  2023     Surgical Pathology Report                         Case: [id number]                                  Authorizing Provider:  Abdulaziz Donaldson MD    Collected:           2023 07:39 AM          Ordering Location:     [facility] SUR[facility]     Received:            2023 09:39 AM                                 [facility]  MAIN OR                                                     Pathologist:           Chris Jefferson MD                                                         Specimens:   1) - Large Intestine, RANDOM RIGHT COLON BIOPSIES                                                   2) - Large Intestine, RANDOM LEFT COLON BIOPSIES                                           FINALDX  04/06/2023     1. Right Colon Biopsy: Colonic mucosa with   A. Normal crypt architecture with minimally increased intraepithelial lymphocytes.   B. No active inflammation nor granulomatous inflammation identified.    2. Random Left Colon Biopsy: Colonic mucosa with   A. Normal crypt architecture with minimally increased intraepithelial lymphocytes (see comment).   B. No active inflammation nor granulomatous inflammation identified.    carolt/jennifere        Lab Results   Component Value Date    HGB 14.5 05/23/2025    MCV 85.9 05/23/2025     05/23/2025    ALT 16 05/23/2025    AST 20 05/23/2025    HGBA1C 5.1 11/19/2020    INR 1.0 04/17/2024    TRIG 271 (H) 11/19/2020      CT Abdomen Pelvis With Contrast  Result Date: 5/23/2025  Narrative: CT OF THE ABDOMEN PELVIS WITH CONTRAST  HISTORY: Lower abdominal pain and perirectal pain  COMPARISON: None available.  TECHNIQUE: Axial CT imaging was obtained through the abdomen and pelvis. IV contrast was administered.  FINDINGS: Images through the lung bases demonstrate some minimal dependent atelectasis. No suspicious hepatic lesions are seen. The stomach, duodenum, adrenal glands, spleen, pancreas, and gallbladder all appear normal. The kidneys enhance symmetrically. No hydronephrosis is seen. There are small bilateral nonobstructing renal stones. The uterus appears unremarkable. There is probable hemorrhagic cyst on the right ovary. It measures 2.4 x 1.8 cm. There are some patulous loops of small bowel which are noted within the lower abdomen and right hemipelvis. This could reflect enteritis. I don't see  evidence of appendicitis. No acute osseous abnormalities are seen. There is a tiny fat-containing umbilical hernia.      Impression:  1. The patient does have some mildly patulous loops of small bowel within the lower abdomen and right hemipelvis. Enteritis would be consideration. 2. Probable hemorrhagic cyst on the right ovary. 3. Punctate nonobstructing bilateral renal stones.  Radiation dose reduction techniques were utilized, including automated exposure control and exposure modulation based on body size.   This report was finalized on 5/23/2025 9:56 PM by Dr. Albina Anna M.D on Workstation: [id number]        REVIEW OF SYSTEMS  Review of Systems      ACTIVE PROBLEMS  Patient Active Problem List    Diagnosis     Lymphocytic colitis [K52.832]     Diarrhea [R19.7]     Mild episode of recurrent major depressive disorder [F33.0]     Recurrent major depressive episodes, moderate [F33.1]     Polysubstance abuse [F19.10]     Moderate opioid dependence in early remission on maintenance therapy [F11.21]     External hemorrhoids [K64.4]     External hemorrhoids [K64.4]     High risk pregnancy due to maternal drug abuse in second trimester [O99.322, F19.10]     Hepatitis C [B19.20]     Drug use [F19.90]     Current every day smoker [F17.200]     Hepatitis C [B19.20]     Methadone use [F11.90]     Encounter for long-term methadone use for opiate dependence [F11.20]     Tobacco use [Z72.0]     Opioid dependence [F11.20]     Heroin dependence [F11.20]     Tobacco dependence syndrome [F17.200]          PAST MEDICAL HISTORY  Past Medical History:   Diagnosis Date    Depression     Diarrhea     Hepatitis-C     states not active, never had treatment.      Herpes genitalia     History of ADHD     AGE 6 TO 12,  TOOK PRESCRIPTION.    History of vertebral fracture     L5. no surgery, brace and PT    Hx of drug abuse     Kidney stone 12/25/2018    Opiate addiction          SURGICAL HISTORY  Past Surgical History:   Procedure  Laterality Date    COLONOSCOPY N/A 4/6/2023    Procedure: COLONOSCOPY TO CECUM WITH COLD BIOPSIES;  Surgeon: Abdulaziz Donaldson MD;  [place] [facility] [place];  Service: Gastroenterology;  Laterality: N/A;  HEMORROIDS    D & C WITH SUCTION N/A 3/15/2019    Procedure: DILATATION AND CURETTAGE WITH SUCTION;  Surgeon: Megan Campos MD;  Location: [facility] MAIN OR;  Service: Obstetrics/Gynecology    KIDNEY STONE SURGERY      WISDOM TOOTH EXTRACTION           FAMILY HISTORY  Family History   Problem Relation Age of Onset    No Known Problems Father     Endometriosis Mother     Malig Hyperthermia Neg Hx     Breast cancer Neg Hx     Uterine cancer Neg Hx     Ovarian cancer Neg Hx     Colon cancer Neg Hx     Congenital heart disease Neg Hx     Colon polyps Neg Hx     Crohn's disease Neg Hx     Irritable bowel syndrome Neg Hx     Ulcerative colitis Neg Hx          SOCIAL HISTORY  Social History     Occupational History    Occupation: UNEMPLOYED   Tobacco Use    Smoking status: Every Day     Current packs/day: 0.50     Average packs/day: 0.5 packs/day for 10.0 years (5.0 ttl pk-yrs)     Types: Cigarettes     Passive exposure: Past (two cigarettes today)    Smokeless tobacco: Never    Tobacco comments:     not  interested currently-trying to quit on own   Vaping Use    Vaping status: Former    Substances: CBD   Substance and Sexual Activity    Alcohol use: Yes     Comment: 3x per week    Drug use: Not Currently     Types: Marijuana     Comment: heroin daily, over 3 years ago, pain pills-occasionally, a month for marijuana    Sexual activity: Not Currently     Partners: Male     Birth control/protection: Implant, Nexplanon     Comment: Nexplanon 08/15/2023         CURRENT MEDICATIONS    Current Outpatient Medications:     Acetaminophen Extra Strength 500 MG tablet, TAKE 1 OR 2 TABLETS BY MOUTH EVERY 6 TO 8 HOURS AS NEEDED FOR PAIN, Disp: , Rfl:     buprenorphine-naloxone (SUBOXONE) 8-2 MG per SL tablet, Place 1 tablet  "under the tongue Daily., Disp: , Rfl:     cyclobenzaprine (FLEXERIL) 10 MG tablet, Take 1 tablet by mouth 3 (Three) Times a Day As Needed for Muscle Spasms., Disp: 90 tablet, Rfl: 0    dicyclomine (BENTYL) 20 MG tablet, Take 1 tablet by mouth 4 (Four) Times a Day Before Meals & at Bedtime As Needed for Abdominal Cramping., Disp: 90 tablet, Rfl: 3    Etonogestrel (NEXPLANON) 68 MG implant subdermal implant, To be inserted one time by prescriber. Route Subdermal., Disp: , Rfl:     ibuprofen (ADVIL,MOTRIN) 800 MG tablet, Take 1 tablet by mouth Every 8 (Eight) Hours As Needed. for pain, Disp: , Rfl:     Prenatal Vit-Fe Fumarate-FA (prenatal vitamin 27-0.8) 27-0.8 MG tablet tablet, Take 1 tablet by mouth Daily., Disp: 90 tablet, Rfl: 3    Naloxegol Oxalate (Movantik) 12.5 MG tablet, Take 1 tablet by mouth Every Morning., Disp: 30 tablet, Rfl: 3    saccharomyces boulardii (Florastor) 250 MG capsule, Take 1 capsule by mouth 2 (Two) Times a Day., Disp: 60 capsule, Rfl: 11    ALLERGIES  Patient has no known allergies.    VITALS  Vitals:    06/02/25 0909   BP: 112/80   BP Location: Left arm   Patient Position: Sitting   Cuff Size: Adult   Weight: 71.7 kg (158 lb)   Height: 165.1 cm (65\")       PHYSICAL EXAM  Debilities/Disabilities Identified: None  Emotional Behavior: Appropriate  Wt Readings from Last 3 Encounters:   06/02/25 71.7 kg (158 lb)   07/24/24 86.7 kg (191 lb 3.2 oz)   04/25/24 89.3 kg (196 lb 12.8 oz)     Ht Readings from Last 1 Encounters:   06/02/25 165.1 cm (65\")     Body mass index is 26.29 kg/m².  Physical Exam  Constitutional:       General: She is not in acute distress.     Appearance: Normal appearance. She is not ill-appearing.   HENT:      Head: Normocephalic and atraumatic.      Mouth/Throat:      Mouth: Mucous membranes are moist.      Pharynx: No posterior oropharyngeal erythema.   Eyes:      General: No scleral icterus.  Cardiovascular:      Rate and Rhythm: Normal rate and regular rhythm.      " Heart sounds: Normal heart sounds.   Pulmonary:      Effort: Pulmonary effort is normal.      Breath sounds: Normal breath sounds.   Abdominal:      General: Abdomen is flat. Bowel sounds are normal. There is no distension.      Palpations: Abdomen is soft. There is no mass.      Tenderness: There is abdominal tenderness in the right upper quadrant, right lower quadrant, left upper quadrant and left lower quadrant. There is no guarding or rebound. Negative signs include Lange's sign.      Hernia: No hernia is present.   Musculoskeletal:      Cervical back: Neck supple.   Skin:     General: Skin is warm.      Capillary Refill: Capillary refill takes less than 2 seconds.   Neurological:      General: No focal deficit present.      Mental Status: She is alert and oriented to person, place, and time.   Psychiatric:         Mood and Affect: Mood normal.         Behavior: Behavior normal.         Thought Content: Thought content normal.         Judgment: Judgment normal.         CLINICAL DATA REVIEWED   reviewed previous lab results and integrated with today's visit, reviewed notes from other physicians and/or last GI encounter, reviewed previous endoscopy results and available photos, reviewed surgical pathology results from previous biopsies    ASSESSMENT  Diagnoses and all orders for this visit:    Therapeutic opioid-induced constipation (OIC)    Abdominal cramping    Other orders  -     Acetaminophen Extra Strength 500 MG tablet; TAKE 1 OR 2 TABLETS BY MOUTH EVERY 6 TO 8 HOURS AS NEEDED FOR PAIN  -     ibuprofen (ADVIL,MOTRIN) 800 MG tablet; Take 1 tablet by mouth Every 8 (Eight) Hours As Needed. for pain  -     Etonogestrel (NEXPLANON) 68 MG implant subdermal implant; To be inserted one time by prescriber. Route Subdermal.  -     saccharomyces boulardii (Florastor) 250 MG capsule; Take 1 capsule by mouth 2 (Two) Times a Day.  -     dicyclomine (BENTYL) 20 MG tablet; Take 1 tablet by mouth 4 (Four) Times a Day Before  Meals & at Bedtime As Needed for Abdominal Cramping.  -     Naloxegol Oxalate (Movantik) 12.5 MG tablet; Take 1 tablet by mouth Every Morning.          PLAN    OIC: will send Movantik 12.5 mg  Start daily probiotic  PRN dicyclomine before meals    Return in about 6 weeks (around 7/14/2025).    I have discussed the above plan with the patient.  They verbalize understanding and are in agreement with the plan.  They have been advised to contact the office for any questions, concerns, or changes related to their health.

## 2025-06-02 NOTE — TELEPHONE ENCOUNTER
Approved today by [place] [employer] 2017  The request has been approved. The authorization is effective from 06/02/2025 to 06/02/2026, as long as the member is enrolled in their current health plan. A written notification letter will follow with additional details.  Effective Date: 6/2/2025  Authorization Expiration Date: 6/2/2026

## 2025-06-04 ENCOUNTER — OFFICE VISIT (OUTPATIENT)
Dept: OBSTETRICS AND GYNECOLOGY | Age: 30
End: 2025-06-04
Payer: COMMERCIAL

## 2025-06-04 VITALS
WEIGHT: 158 LBS | BODY MASS INDEX: 26.33 KG/M2 | DIASTOLIC BLOOD PRESSURE: 64 MMHG | HEIGHT: 65 IN | SYSTOLIC BLOOD PRESSURE: 110 MMHG

## 2025-06-04 DIAGNOSIS — Z97.5 NEXPLANON IN PLACE: ICD-10-CM

## 2025-06-04 DIAGNOSIS — N83.209 CYST OF OVARY, UNSPECIFIED LATERALITY: Primary | ICD-10-CM

## 2025-06-04 PROCEDURE — 1159F MED LIST DOCD IN RCRD: CPT | Performed by: PHYSICIAN ASSISTANT

## 2025-06-04 PROCEDURE — 99213 OFFICE O/P EST LOW 20 MIN: CPT | Performed by: PHYSICIAN ASSISTANT

## 2025-06-04 PROCEDURE — 1160F RVW MEDS BY RX/DR IN RCRD: CPT | Performed by: PHYSICIAN ASSISTANT

## 2025-06-04 NOTE — PROGRESS NOTES
"Subjective     Chief Complaint   Patient presents with    Follow-up     Was seen in ER on 2024 had CT scan and they seen a hemorrhagic cyst on right ovary.   Ultrasound done today.       Estephania Pathak is a 29 y.o.  whose LMP is Patient's last menstrual period was 2025 (exact date). presents for u/s    Estephania had a CT of her abdomen and pelvis on the 23rd may  They saw a \"hemorrhagic cyst\" and suggested f/u for an u/s  She is here for that today    She has no other c/o  Does ask about how to diagnose or further eval for kidney stones  I recommend f/u with pcp for additional imaging       No Additional Complaints Reported    The following portions of the patient's history were reviewed and updated as appropriate:no additional history reviewed, vital signs, allergies, current medications, past medical history, past social history, past surgical history, and problem list      Review of Systems   Genitourinary:?hemorrhagic cyst     Objective      /64   Ht 165.1 cm (65\")   Wt 71.7 kg (158 lb)   LMP 2025 (Exact Date)   BMI 26.29 kg/m²     Physical Exam    General:   alert, comfortable, and no distress   Heart: Not performed today   Lungs: Not performed today.   Breast: Not performed today   Neck: Not performed today   Abdomen: Not performed today   CVA: Not performed today   Pelvis: Not performed today   Extremities: Not performed today   Neurologic: Not performed today   Psychiatric: Normal affect, judgement, and mood       Lab Review   Labs: No data reviewed    Imaging   Ultrasound - Pelvic Vaginal  Impression: ** Preliminary Reading ** This report has not been finalized by a physician      1.  Uterus: Normal size, with uterine volume of 72 ml, and with uterine dimensions 7.8 x 4.5 x 3.8 cm     2.  Endometrium: Normal non-menopausal thickness and 5 mm     3.  Myometrium: Normal homogenous texture     4.  Ovaries  Left:    Not visualized  Right:  Mostly unremarkable, Normal small " follicles, and with ovarian volume: 5.4 ml   No evidence of hemorrhagic cyst seen     Relevant comparison data: Comparison is made with study dated: 5/23/2025, ct of abdomen and pelvis  Assessment & Plan     ASSESSMENT  1. Cyst of ovary, unspecified laterality    2. Nexplanon in place          PLAN  1. U/s reassuring. No hemorrhagic cyst seen    F/u for annual exam. Was due in October 2024    F/u with pcp to further eval for kidney stones    Follow up: 3 month(s) for annual exam    NAY Parrish  6/4/2025

## 2025-06-23 RX ORDER — VALACYCLOVIR HYDROCHLORIDE 500 MG/1
500 TABLET, FILM COATED ORAL 2 TIMES DAILY
Qty: 6 TABLET | Refills: 0 | OUTPATIENT
Start: 2025-06-23 | End: 2025-06-26

## 2025-06-23 RX ORDER — HYDROXYZINE HYDROCHLORIDE 25 MG/1
TABLET, FILM COATED ORAL
Qty: 180 TABLET | Refills: 0 | OUTPATIENT
Start: 2025-06-23

## 2025-07-08 ENCOUNTER — OFFICE VISIT (OUTPATIENT)
Dept: OBSTETRICS AND GYNECOLOGY | Age: 30
End: 2025-07-08
Payer: COMMERCIAL

## 2025-07-08 VITALS
SYSTOLIC BLOOD PRESSURE: 112 MMHG | BODY MASS INDEX: 26.33 KG/M2 | HEIGHT: 65 IN | WEIGHT: 158 LBS | DIASTOLIC BLOOD PRESSURE: 74 MMHG

## 2025-07-08 DIAGNOSIS — Z01.419 ENCOUNTER FOR GYNECOLOGICAL EXAMINATION WITHOUT ABNORMAL FINDING: Primary | ICD-10-CM

## 2025-07-08 RX ORDER — VALACYCLOVIR HYDROCHLORIDE 1 G/1
1000 TABLET, FILM COATED ORAL 2 TIMES DAILY PRN
Qty: 30 TABLET | Refills: 2 | Status: SHIPPED | OUTPATIENT
Start: 2025-07-08 | End: 2025-07-10

## 2025-07-08 NOTE — PROGRESS NOTES
Routine Annual Visit    2025    Patient: Estephania Pathak          MR#:[id number]      Chief Complaint   Patient presents with    Gynecologic Exam     AE Today, Last pap 2023 (-)       History of Present Illness    29 y.o. female  who presents for annual exam.     Trying to wean off suboxone to help with GI issues but now her sister is having neurosurgery due to stroke and is very stressed.   Has been having recurrent lower abdominal pain and is seeing GI but not getting a lot of answers.   Did have TVUS recently and was normal  Happy with nexplanon and has another year left with current one    No LMP recorded. Patient has had an implant.  Obstetric History:  OB History          2    Para   1    Term   1       0    AB   1    Living   1         SAB   1    IAB   0    Ectopic   0    Molar   0    Multiple        Live Births   1          Obstetric Comments   20 - 33-6 weeks - EFW 54%, AC 74% - [facility]                Menstrual History:     No LMP recorded. Patient has had an implant.       ________________________________________  Patient Active Problem List   Diagnosis    Tobacco use    Methadone use    External hemorrhoids    Hepatitis C    Drug use    Current every day smoker    Polysubstance abuse    Encounter for long-term methadone use for opiate dependence    Mild episode of recurrent major depressive disorder    Tobacco dependence syndrome    Diarrhea    Heroin dependence    Moderate opioid dependence in early remission on maintenance therapy    Opioid dependence    External hemorrhoids    Hepatitis C    High risk pregnancy due to maternal drug abuse in second trimester    Recurrent major depressive episodes, moderate    Lymphocytic colitis       Past Medical History:   Diagnosis Date    Depression     Diarrhea     Hepatitis-C     states not active, never had treatment.      Herpes genitalia     History of ADHD     AGE 6 TO 12,  TOOK PRESCRIPTION.    History of vertebral fracture     L5.  "no surgery, brace and PT    Hx of drug abuse     Kidney stone 12/25/2018    Opiate addiction        Family History   Problem Relation Age of Onset    No Known Problems Father     Endometriosis Mother     Malig Hyperthermia Neg Hx     Breast cancer Neg Hx     Uterine cancer Neg Hx     Ovarian cancer Neg Hx     Colon cancer Neg Hx     Congenital heart disease Neg Hx     Colon polyps Neg Hx     Crohn's disease Neg Hx     Irritable bowel syndrome Neg Hx     Ulcerative colitis Neg Hx        Past Surgical History:   Procedure Laterality Date    COLONOSCOPY N/A 4/6/2023    Procedure: COLONOSCOPY TO CECUM WITH COLD BIOPSIES;  Surgeon: Abdulaziz Donaldson MD;  Location: [facility] [place] [facility];  Service: Gastroenterology;  Laterality: N/A;  HEMORROIDS    D & C WITH SUCTION N/A 3/15/2019    Procedure: DILATATION AND CURETTAGE WITH SUCTION;  Surgeon: Megan Campos MD;  Location: [facility] MAIN OR;  Service: Obstetrics/Gynecology    KIDNEY STONE SURGERY      WISDOM TOOTH EXTRACTION         Social History     Tobacco Use   Smoking Status Every Day    Current packs/day: 0.50    Average packs/day: 0.5 packs/day for 10.0 years (5.0 ttl pk-yrs)    Types: Cigarettes    Passive exposure: Past (two cigarettes today)   Smokeless Tobacco Never   Tobacco Comments    not  interested currently-trying to quit on own       has a current medication list which includes the following prescription(s): buprenorphine-naloxone, dicyclomine, etonogestrel, acetaminophen extra strength, cyclobenzaprine, ibuprofen, naloxegol oxalate, and prenatal vitamin 27-0.8.  ________________________________________        Objective   Physical Exam    /74   Ht 165.1 cm (65\")   Wt 71.7 kg (158 lb)   BMI 26.29 kg/m²    BP Readings from Last 3 Encounters:   07/08/25 112/74   06/04/25 110/64   06/02/25 112/80      Wt Readings from Last 3 Encounters:   07/08/25 71.7 kg (158 lb)   06/04/25 71.7 kg (158 lb)   06/02/25 71.7 kg (158 lb)         BMI: Body mass index is " 26.29 kg/m².       General:   alert, appears stated age, and cooperative   Neck: No thyromegaly or LAD   Abdomen: soft, non-tender, without masses or organomegaly   Breast: inspection negative, no nipple discharge or bleeding, no masses or nodularity palpable   Urethra and bladder: urethral meatus normal; bladder nontender to palpation;   Vulva: normal, Bartholin's, Urethra, [place]'s normal   Vagina: normal mucosa, normal discharge   Cervix: multiparous appearance and no lesions   Uterus: normal size, non-tender, and anteverted   Adnexa: normal adnexa and no mass, fullness, tenderness       Assessment:    normal annual exam     Plan:    Plan     []  Mammogram request made  [x]  PAP done  []  Labs:   [x]  GC/Chl/TV  []  DEXA scan   []  Referral for colonoscopy:       Counseling  [] Menopause  [x]  Nutrition  [x]  Physical activity/regular exercise   [x]  Healthy weight  []  Injury prevention  []  Smoking cessation  []  Substance misuse/abuse  [x]  Sexual behavior  []  STD prevention  [x]  Contraception  []  Dental health  []  Mental health  []  Immunization  [x]  Encouraged SBE        Olimpia Franco MD  07/08/2025  15:52 EDT

## 2025-07-10 ENCOUNTER — HOSPITAL ENCOUNTER (OUTPATIENT)
Dept: GENERAL RADIOLOGY | Facility: HOSPITAL | Age: 30
Discharge: HOME | End: 2025-07-10
Payer: COMMERCIAL

## 2025-07-10 ENCOUNTER — OFFICE VISIT (OUTPATIENT)
Dept: GASTROENTEROLOGY | Facility: CLINIC | Age: 30
End: 2025-07-10
Payer: COMMERCIAL

## 2025-07-10 VITALS
BODY MASS INDEX: 26.46 KG/M2 | DIASTOLIC BLOOD PRESSURE: 70 MMHG | SYSTOLIC BLOOD PRESSURE: 118 MMHG | WEIGHT: 158.8 LBS | HEIGHT: 65 IN

## 2025-07-10 DIAGNOSIS — R19.7 DIARRHEA, UNSPECIFIED TYPE: Primary | ICD-10-CM

## 2025-07-10 DIAGNOSIS — R19.7 DIARRHEA, UNSPECIFIED TYPE: ICD-10-CM

## 2025-07-10 DIAGNOSIS — R10.9 ABDOMINAL PAIN, UNSPECIFIED ABDOMINAL LOCATION: ICD-10-CM

## 2025-07-10 PROCEDURE — 74018 RADEX ABDOMEN 1 VIEW: CPT

## 2025-07-10 RX ORDER — PRENATAL VIT NO.126/IRON/FOLIC 28MG-0.8MG
TABLET ORAL DAILY
COMMUNITY

## 2025-07-10 NOTE — PROGRESS NOTES
Antiphospholipid syndrome    PATIENT INFORMATION  Estephania Pathak       - 1995    CHIEF COMPLAINT  Chief Complaint   Patient presents with    Opioid induced constipation    Abdominal Cramping       HISTORY OF PRESENT ILLNESS    Here today for diarrhea follow-up     Last seen at [facility] in      Bowels move once a week when she is taking suboxone, when off, diarrhea is constant. She also has LLQ pain. She did not start movantik at [facility] for fear of diarrhea. Will get KUB today.     Seen in ER  with intermittent lower abdominal pain for a few months, with shooting pains in rectum. CMP, lipase, CBC unremarkable other than mild elevation WBC.  CT AP with mildly patulous small bowel, possible enteritis. Probable hemorrhagic cyst R ovary. Gerber punctate nonobstructing renal stones.     23 Colonoscopy with small, nonbleeding internal hemorrhoids, bx positive for microscopic colitis     PMHx HCV, rna negative        REVIEWED PERTINENT RESULTS/ LABS  Lab Results   Component Value Date    CASEREPORT  2023     Surgical Pathology Report                         Case: [id number]                                  Authorizing Provider:  Abdulaziz Donaldson MD    Collected:           2023 07:39 AM          Ordering Location:     [facility]     Received:            2023 09:39 AM                                 CENTER [place] MAIN OR                                                     Pathologist:           Chris Jefferson MD                                                         Specimens:   1) - Large Intestine, RANDOM RIGHT COLON BIOPSIES                                                   2) - Large Intestine, RANDOM LEFT COLON BIOPSIES                                           FINALDX  2023     1. Right Colon Biopsy: Colonic mucosa with   A. Normal crypt architecture with minimally increased intraepithelial lymphocytes.   B. No active inflammation nor granulomatous  inflammation identified.    2. Random Left Colon Biopsy: Colonic mucosa with   A. Normal crypt architecture with minimally increased intraepithelial lymphocytes (see comment).   B. No active inflammation nor granulomatous inflammation identified.    jat/jennifere        Lab Results   Component Value Date    HGB 14.5 05/23/2025    MCV 85.9 05/23/2025     05/23/2025    ALT 16 05/23/2025    AST 20 05/23/2025    HGBA1C 5.1 11/19/2020    INR 1.0 04/17/2024    TRIG 271 (H) 11/19/2020      XR Abdomen KUB  Result Date: 7/10/2025  Narrative: XR ABDOMEN KUB Date of Exam: 7/10/2025 3:45 PM EDT Indication: Diarrhea, suboxone, assess stool burden Comparison: None available. FINDINGS: A nonspecific bowel gas pattern is observed. A large stool burden is seen throughout the colon. No definitive pathologic calcifications are seen. No acute osseous abnormalities are noted.     Impression: 1.A nonspecific bowel gas pattern is observed. A large stool burden is seen throughout the colon indicating constipation. Electronically Signed: Woodrow De Souza MD  7/10/2025 4:03 PM EDT  Workstation ID: [id number]      REVIEW OF SYSTEMS  Review of Systems      ACTIVE PROBLEMS  Patient Active Problem List    Diagnosis     Lymphocytic colitis [K52.832]     Diarrhea [R19.7]     Mild episode of recurrent major depressive disorder [F33.0]     Recurrent major depressive episodes, moderate [F33.1]     Polysubstance abuse [F19.10]     Moderate opioid dependence in early remission on maintenance therapy [F11.21]     External hemorrhoids [K64.4]     External hemorrhoids [K64.4]     High risk pregnancy due to maternal drug abuse in second trimester [O99.322, F19.10]     Hepatitis C [B19.20]     Drug use [F19.90]     Current every day smoker [F17.200]     Hepatitis C [B19.20]     Methadone use [F11.90]     Encounter for long-term methadone use for opiate dependence [F11.20]     Tobacco use [Z72.0]     Opioid dependence [F11.20]     Heroin dependence [F11.20]      Tobacco dependence syndrome [F17.200]          PAST MEDICAL HISTORY  Past Medical History:   Diagnosis Date    Depression     Diarrhea     Hepatitis-C     states not active, never had treatment.      Herpes genitalia     History of ADHD     AGE 6 TO 12,  TOOK PRESCRIPTION.    History of vertebral fracture     L5. no surgery, brace and PT    Hx of drug abuse     Kidney stone 12/25/2018    Opiate addiction          SURGICAL HISTORY  Past Surgical History:   Procedure Laterality Date    COLONOSCOPY N/A 4/6/2023    Procedure: COLONOSCOPY TO CECUM WITH COLD BIOPSIES;  Surgeon: Abdulaziz Donaldson MD;  Location: [facility] MAIN OR;  Service: Gastroenterology;  Laterality: N/A;  HEMORROIDS    D & C WITH SUCTION N/A 3/15/2019    Procedure: DILATATION AND CURETTAGE WITH SUCTION;  Surgeon: Megan Campos MD;  Location: [facility] MAIN OR;  Service: Obstetrics/Gynecology    KIDNEY STONE SURGERY      WISDOM TOOTH EXTRACTION           FAMILY HISTORY  Family History   Problem Relation Age of Onset    No Known Problems Father     Endometriosis Mother     Malig Hyperthermia Neg Hx     Breast cancer Neg Hx     Uterine cancer Neg Hx     Ovarian cancer Neg Hx     Colon cancer Neg Hx     Congenital heart disease Neg Hx     Colon polyps Neg Hx     Crohn's disease Neg Hx     Irritable bowel syndrome Neg Hx     Ulcerative colitis Neg Hx          SOCIAL HISTORY  Social History     Occupational History    Occupation: UNEMPLOYED   Tobacco Use    Smoking status: Every Day     Current packs/day: 0.50     Average packs/day: 0.5 packs/day for 10.0 years (5.0 ttl pk-yrs)     Types: Cigarettes     Passive exposure: Past (two cigarettes today)    Smokeless tobacco: Never    Tobacco comments:     not  interested currently-trying to quit on own   Vaping Use    Vaping status: Former    Substances: CBD   Substance and Sexual Activity    Alcohol use: Yes     Comment: 3x per week    Drug use: Not Currently     Types: Marijuana     Comment: heroin daily,  "over 3 years ago, pain pills-occasionally, a month for marijuana    Sexual activity: Not Currently     Partners: Male     Birth control/protection: Implant, Nexplanon     Comment: Nexplanon 08/15/2023         CURRENT MEDICATIONS    Current Outpatient Medications:     buprenorphine-naloxone (SUBOXONE) 8-2 MG per SL tablet, Place 1 tablet under the tongue Daily. (Patient taking differently: Place 1 tablet under the tongue Daily. 4 mg), Disp: , Rfl:     dicyclomine (BENTYL) 20 MG tablet, Take 1 tablet by mouth 4 (Four) Times a Day Before Meals & at Bedtime As Needed for Abdominal Cramping., Disp: 90 tablet, Rfl: 3    Etonogestrel (NEXPLANON) 68 MG implant subdermal implant, To be inserted one time by prescriber. Route Subdermal., Disp: , Rfl:     prenatal vitamin (prenatal, CLASSIC, vitamin) tablet, Take  by mouth Daily., Disp: , Rfl:     ALLERGIES  Patient has no known allergies.    VITALS  Vitals:    07/10/25 1518   BP: 118/70   Weight: 72 kg (158 lb 12.8 oz)   Height: 165.1 cm (65\")       PHYSICAL EXAM  Debilities/Disabilities Identified: None  Emotional Behavior: Appropriate    Wt Readings from Last 3 Encounters:   07/10/25 72 kg (158 lb 12.8 oz)   07/08/25 71.7 kg (158 lb)   06/04/25 71.7 kg (158 lb)     Ht Readings from Last 1 Encounters:   07/10/25 165.1 cm (65\")     Body mass index is 26.43 kg/m².  Physical Exam  Constitutional:       General: She is not in acute distress.     Appearance: Normal appearance. She is not ill-appearing.   HENT:      Head: Normocephalic and atraumatic.      Mouth/Throat:      Mouth: Mucous membranes are moist.      Pharynx: No posterior oropharyngeal erythema.   Eyes:      General: No scleral icterus.  Cardiovascular:      Rate and Rhythm: Normal rate and regular rhythm.      Heart sounds: Normal heart sounds.   Pulmonary:      Effort: Pulmonary effort is normal.      Breath sounds: Normal breath sounds.   Abdominal:      General: Abdomen is flat. Bowel sounds are normal. There is " no distension.      Palpations: Abdomen is soft. There is no mass.      Tenderness: There is no abdominal tenderness. There is no guarding or rebound. Negative signs include Lange's sign.      Hernia: No hernia is present.   Musculoskeletal:      Cervical back: Neck supple.   Skin:     General: Skin is warm.      Capillary Refill: Capillary refill takes less than 2 seconds.   Neurological:      General: No focal deficit present.      Mental Status: She is alert and oriented to person, place, and time.   Psychiatric:         Mood and Affect: Mood normal.         Behavior: Behavior normal.         Thought Content: Thought content normal.         Judgment: Judgment normal.         CLINICAL DATA REVIEWED   reviewed previous lab results and integrated with today's visit, reviewed notes from other physicians and/or last GI encounter, reviewed previous endoscopy results and available photos, reviewed surgical pathology results from previous biopsies    ASSESSMENT  Diagnoses and all orders for this visit:    Diarrhea, unspecified type  -     XR Abdomen KUB; Future    Abdominal pain, unspecified abdominal location    Other orders  -     prenatal vitamin (prenatal, CLASSIC, vitamin) tablet; Take  by mouth Daily.          PLAN    KUB to assess stool burden    Return in about 3 months (around 10/10/2025).    I have discussed the above plan with the patient.  They verbalize understanding and are in agreement with the plan.  They have been advised to contact the office for any questions, concerns, or changes related to their health.

## 2025-07-11 LAB
C TRACH RRNA CVX QL NAA+PROBE: NEGATIVE
CYTOLOGIST CVX/VAG CYTO: ABNORMAL
CYTOLOGY CVX/VAG DOC CYTO: ABNORMAL
CYTOLOGY CVX/VAG DOC THIN PREP: ABNORMAL
DX ICD CODE: ABNORMAL
HPV GENOTYPE REFLEX: ABNORMAL
HPV I/H RISK 4 DNA CVX QL PROBE+SIG AMP: POSITIVE
HPV16 DNA CVX QL PROBE+SIG AMP: NEGATIVE
HPV18+45 E6+E7 MRNA CVX QL NAA+PROBE: NEGATIVE
N GONORRHOEA RRNA CVX QL NAA+PROBE: NEGATIVE
OTHER STN SPEC: ABNORMAL
SERVICE CMNT-IMP: ABNORMAL
STAT OF ADQ CVX/VAG CYTO-IMP: ABNORMAL
T VAGINALIS RRNA SPEC QL NAA+PROBE: NEGATIVE

## 2025-07-16 ENCOUNTER — CLINICAL SUPPORT (OUTPATIENT)
Dept: OBSTETRICS AND GYNECOLOGY | Age: 30
End: 2025-07-16
Payer: COMMERCIAL

## 2025-07-16 DIAGNOSIS — Z23 NEED FOR HPV VACCINE: Primary | ICD-10-CM

## 2025-07-25 RX ORDER — PNV NO.95/FERROUS FUM/FOLIC AC 28MG-0.8MG
1 TABLET ORAL DAILY
Qty: 90 TABLET | Refills: 3 | OUTPATIENT
Start: 2025-07-25

## 2025-08-01 ENCOUNTER — TELEPHONE (OUTPATIENT)
Dept: GASTROENTEROLOGY | Facility: CLINIC | Age: 30
End: 2025-08-01
Payer: COMMERCIAL

## 2025-08-04 ENCOUNTER — TELEPHONE (OUTPATIENT)
Dept: GASTROENTEROLOGY | Facility: CLINIC | Age: 30
End: 2025-08-04
Payer: COMMERCIAL

## 2025-08-18 RX ORDER — PNV NO.95/FERROUS FUM/FOLIC AC 28MG-0.8MG
1 TABLET ORAL DAILY
Qty: 90 TABLET | Refills: 3 | OUTPATIENT
Start: 2025-08-18

## 2025-08-18 RX ORDER — DICYCLOMINE HCL 20 MG
TABLET ORAL
Qty: 90 TABLET | Refills: 3 | Status: SHIPPED | OUTPATIENT
Start: 2025-08-18

## (undated) DEVICE — LOU D & C HYSTEROSCOPY: Brand: MEDLINE INDUSTRIES, INC.

## (undated) DEVICE — PAD SANI MAXI W/ADHS SNG WRP 11IN

## (undated) DEVICE — GOWN ISOL W/THUMB UNIV BLU BX/15

## (undated) DEVICE — HOSE BT TO BT VAC CURETTAGE SNGL PT USE

## (undated) DEVICE — ST COL BERKELY TBG

## (undated) DEVICE — Device

## (undated) DEVICE — KT ORCA ORCAPOD DISP STRL

## (undated) DEVICE — STRAP STIRUP WO/ RNG

## (undated) DEVICE — SYRINGE, LUER SLIP, STERILE, 60ML: Brand: MEDLINE

## (undated) DEVICE — CANN NASL CO2 TRULINK W/O2 A/

## (undated) DEVICE — SACK GZ PERM

## (undated) DEVICE — GOWN ,SIRUS,NONREINFORCED 3XL: Brand: MEDLINE

## (undated) DEVICE — CANSTR SXN UTER NO FLTR SURG

## (undated) DEVICE — VIAL FORMLN CAP 10PCT 20ML

## (undated) DEVICE — FLEX ADVANTAGE 1500CC: Brand: FLEX ADVANTAGE

## (undated) DEVICE — SINGLE-USE BIOPSY FORCEPS: Brand: RADIAL JAW 4

## (undated) DEVICE — GLV SURG BIOGEL LTX PF 6 1/2

## (undated) DEVICE — TBG W FLTR FOR BERKELEY SYSTEM

## (undated) DEVICE — CANN VAC GYN VACURETTE BERKELEY CRV 8MM 1P/U STRL